# Patient Record
Sex: MALE | Race: WHITE | NOT HISPANIC OR LATINO | Employment: OTHER | URBAN - METROPOLITAN AREA
[De-identification: names, ages, dates, MRNs, and addresses within clinical notes are randomized per-mention and may not be internally consistent; named-entity substitution may affect disease eponyms.]

---

## 2017-01-13 ENCOUNTER — ALLSCRIPTS OFFICE VISIT (OUTPATIENT)
Dept: OTHER | Facility: OTHER | Age: 67
End: 2017-01-13

## 2017-01-13 ENCOUNTER — HOSPITAL ENCOUNTER (OUTPATIENT)
Dept: RADIOLOGY | Facility: MEDICAL CENTER | Age: 67
Discharge: HOME | End: 2017-01-13
Payer: COMMERCIAL

## 2017-01-13 ENCOUNTER — TRANSCRIBE ORDERS (OUTPATIENT)
Dept: ADMINISTRATIVE | Facility: HOSPITAL | Age: 67
End: 2017-01-13
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
## 2017-01-13 DIAGNOSIS — S60.012A: ICD-10-CM

## 2017-01-13 DIAGNOSIS — S69.92XA UNSPECIFIED INJURY OF LEFT WRIST, HAND AND FINGER(S), INITIAL ENCOUNTER: ICD-10-CM

## 2017-01-13 DIAGNOSIS — M79.645 PAIN OF FINGER OF LEFT HAND: ICD-10-CM

## 2017-01-13 PROCEDURE — 73140 X-RAY EXAM OF FINGER(S): CPT

## 2017-01-19 ENCOUNTER — ALLSCRIPTS OFFICE VISIT (OUTPATIENT)
Dept: OTHER | Facility: OTHER | Age: 67
End: 2017-01-19

## 2017-01-19 ENCOUNTER — TRANSCRIBE ORDERS (OUTPATIENT)
Dept: ADMINISTRATIVE | Facility: HOSPITAL | Age: 67
End: 2017-01-19

## 2017-01-19 DIAGNOSIS — S60.012A CONTUSION OF LEFT THUMB WITHOUT DAMAGE TO NAIL, INITIAL ENCOUNTER: Primary | ICD-10-CM

## 2017-01-30 ENCOUNTER — HOSPITAL ENCOUNTER (OUTPATIENT)
Dept: MRI IMAGING | Facility: HOSPITAL | Age: 67
Discharge: HOME | End: 2017-01-30
Payer: COMMERCIAL

## 2017-01-30 DIAGNOSIS — S60.012A CONTUSION OF LEFT THUMB WITHOUT DAMAGE TO NAIL, INITIAL ENCOUNTER: ICD-10-CM

## 2017-01-30 PROCEDURE — 73218 MRI UPPER EXTREMITY W/O DYE: CPT

## 2017-02-07 ENCOUNTER — ALLSCRIPTS OFFICE VISIT (OUTPATIENT)
Dept: OTHER | Facility: OTHER | Age: 67
End: 2017-02-07

## 2017-02-13 ENCOUNTER — ALLSCRIPTS OFFICE VISIT (OUTPATIENT)
Dept: OTHER | Facility: OTHER | Age: 67
End: 2017-02-13

## 2017-02-13 DIAGNOSIS — M79.645 PAIN OF FINGER OF LEFT HAND: ICD-10-CM

## 2017-02-13 DIAGNOSIS — S69.92XA UNSPECIFIED INJURY OF LEFT WRIST, HAND AND FINGER(S), INITIAL ENCOUNTER: ICD-10-CM

## 2017-02-13 DIAGNOSIS — E78.5 HYPERLIPIDEMIA: ICD-10-CM

## 2017-03-03 ENCOUNTER — HOSPITAL ENCOUNTER (OUTPATIENT)
Dept: RADIOLOGY | Facility: HOSPITAL | Age: 67
Discharge: HOME | End: 2017-03-03
Attending: ORTHOPAEDIC SURGERY
Payer: COMMERCIAL

## 2017-03-03 ENCOUNTER — ALLSCRIPTS OFFICE VISIT (OUTPATIENT)
Dept: OTHER | Facility: OTHER | Age: 67
End: 2017-03-03

## 2017-03-03 DIAGNOSIS — S69.92XA UNSPECIFIED INJURY OF LEFT WRIST, HAND AND FINGER(S), INITIAL ENCOUNTER: ICD-10-CM

## 2017-03-03 PROCEDURE — 76881 US COMPL JOINT R-T W/IMG: CPT

## 2017-03-06 ENCOUNTER — ALLSCRIPTS OFFICE VISIT (OUTPATIENT)
Dept: OTHER | Facility: OTHER | Age: 67
End: 2017-03-06

## 2017-03-27 ENCOUNTER — APPOINTMENT (OUTPATIENT)
Dept: OCCUPATIONAL THERAPY | Facility: CLINIC | Age: 67
End: 2017-03-27
Payer: COMMERCIAL

## 2017-03-27 DIAGNOSIS — M79.645 PAIN OF FINGER OF LEFT HAND: ICD-10-CM

## 2017-03-27 PROCEDURE — L3913 HFO W/O JOINTS CF: HCPCS

## 2017-03-27 PROCEDURE — 97165 OT EVAL LOW COMPLEX 30 MIN: CPT

## 2017-03-30 ENCOUNTER — APPOINTMENT (OUTPATIENT)
Dept: OCCUPATIONAL THERAPY | Facility: CLINIC | Age: 67
End: 2017-03-30
Payer: COMMERCIAL

## 2017-03-30 PROCEDURE — 97110 THERAPEUTIC EXERCISES: CPT

## 2017-03-30 PROCEDURE — 97140 MANUAL THERAPY 1/> REGIONS: CPT

## 2017-03-30 PROCEDURE — 97010 HOT OR COLD PACKS THERAPY: CPT

## 2017-04-04 ENCOUNTER — APPOINTMENT (OUTPATIENT)
Dept: OCCUPATIONAL THERAPY | Facility: CLINIC | Age: 67
End: 2017-04-04
Payer: COMMERCIAL

## 2017-04-04 PROCEDURE — 97010 HOT OR COLD PACKS THERAPY: CPT

## 2017-04-04 PROCEDURE — 97110 THERAPEUTIC EXERCISES: CPT

## 2017-04-04 PROCEDURE — 97140 MANUAL THERAPY 1/> REGIONS: CPT

## 2017-04-06 ENCOUNTER — APPOINTMENT (OUTPATIENT)
Dept: OCCUPATIONAL THERAPY | Facility: CLINIC | Age: 67
End: 2017-04-06
Payer: COMMERCIAL

## 2017-04-06 PROCEDURE — 97010 HOT OR COLD PACKS THERAPY: CPT

## 2017-04-06 PROCEDURE — 97140 MANUAL THERAPY 1/> REGIONS: CPT

## 2017-04-06 PROCEDURE — 97110 THERAPEUTIC EXERCISES: CPT

## 2017-04-11 ENCOUNTER — APPOINTMENT (OUTPATIENT)
Dept: OCCUPATIONAL THERAPY | Facility: CLINIC | Age: 67
End: 2017-04-11
Payer: COMMERCIAL

## 2017-04-11 PROCEDURE — 97010 HOT OR COLD PACKS THERAPY: CPT

## 2017-04-11 PROCEDURE — 97110 THERAPEUTIC EXERCISES: CPT

## 2017-04-11 PROCEDURE — 97140 MANUAL THERAPY 1/> REGIONS: CPT

## 2017-04-13 ENCOUNTER — APPOINTMENT (OUTPATIENT)
Dept: OCCUPATIONAL THERAPY | Facility: CLINIC | Age: 67
End: 2017-04-13
Payer: COMMERCIAL

## 2017-04-18 ENCOUNTER — APPOINTMENT (OUTPATIENT)
Dept: OCCUPATIONAL THERAPY | Facility: CLINIC | Age: 67
End: 2017-04-18
Payer: COMMERCIAL

## 2017-04-20 ENCOUNTER — APPOINTMENT (OUTPATIENT)
Dept: OCCUPATIONAL THERAPY | Facility: CLINIC | Age: 67
End: 2017-04-20
Payer: COMMERCIAL

## 2017-04-25 ENCOUNTER — APPOINTMENT (OUTPATIENT)
Dept: OCCUPATIONAL THERAPY | Facility: CLINIC | Age: 67
End: 2017-04-25
Payer: COMMERCIAL

## 2017-04-25 PROCEDURE — 97010 HOT OR COLD PACKS THERAPY: CPT

## 2017-04-25 PROCEDURE — 97140 MANUAL THERAPY 1/> REGIONS: CPT

## 2017-04-25 PROCEDURE — 97110 THERAPEUTIC EXERCISES: CPT

## 2017-04-27 ENCOUNTER — APPOINTMENT (OUTPATIENT)
Dept: OCCUPATIONAL THERAPY | Facility: CLINIC | Age: 67
End: 2017-04-27
Payer: COMMERCIAL

## 2017-04-27 PROCEDURE — 97140 MANUAL THERAPY 1/> REGIONS: CPT

## 2017-04-27 PROCEDURE — 97010 HOT OR COLD PACKS THERAPY: CPT

## 2017-04-27 PROCEDURE — 97110 THERAPEUTIC EXERCISES: CPT

## 2017-05-03 ENCOUNTER — APPOINTMENT (OUTPATIENT)
Dept: OCCUPATIONAL THERAPY | Facility: CLINIC | Age: 67
End: 2017-05-03
Payer: COMMERCIAL

## 2017-05-03 PROCEDURE — 97110 THERAPEUTIC EXERCISES: CPT

## 2017-05-03 PROCEDURE — 97140 MANUAL THERAPY 1/> REGIONS: CPT

## 2017-05-03 PROCEDURE — 97010 HOT OR COLD PACKS THERAPY: CPT

## 2017-05-09 ENCOUNTER — APPOINTMENT (OUTPATIENT)
Dept: OCCUPATIONAL THERAPY | Facility: CLINIC | Age: 67
End: 2017-05-09
Payer: COMMERCIAL

## 2017-05-09 PROCEDURE — 97140 MANUAL THERAPY 1/> REGIONS: CPT

## 2017-05-09 PROCEDURE — 97010 HOT OR COLD PACKS THERAPY: CPT

## 2017-05-11 ENCOUNTER — APPOINTMENT (OUTPATIENT)
Dept: OCCUPATIONAL THERAPY | Facility: CLINIC | Age: 67
End: 2017-05-11
Payer: COMMERCIAL

## 2017-05-11 PROCEDURE — 97140 MANUAL THERAPY 1/> REGIONS: CPT

## 2017-05-11 PROCEDURE — 97110 THERAPEUTIC EXERCISES: CPT

## 2017-05-11 PROCEDURE — 97010 HOT OR COLD PACKS THERAPY: CPT

## 2017-05-16 ENCOUNTER — APPOINTMENT (OUTPATIENT)
Dept: OCCUPATIONAL THERAPY | Facility: CLINIC | Age: 67
End: 2017-05-16
Payer: COMMERCIAL

## 2017-05-16 PROCEDURE — 97010 HOT OR COLD PACKS THERAPY: CPT

## 2017-05-16 PROCEDURE — 97140 MANUAL THERAPY 1/> REGIONS: CPT

## 2017-05-16 PROCEDURE — 97110 THERAPEUTIC EXERCISES: CPT

## 2017-05-19 ENCOUNTER — APPOINTMENT (OUTPATIENT)
Dept: OCCUPATIONAL THERAPY | Facility: CLINIC | Age: 67
End: 2017-05-19
Payer: COMMERCIAL

## 2017-05-23 ENCOUNTER — APPOINTMENT (OUTPATIENT)
Dept: OCCUPATIONAL THERAPY | Facility: CLINIC | Age: 67
End: 2017-05-23
Payer: COMMERCIAL

## 2017-05-24 DIAGNOSIS — C61 MALIGNANT NEOPLASM OF PROSTATE (HCC): ICD-10-CM

## 2017-05-26 ENCOUNTER — APPOINTMENT (OUTPATIENT)
Dept: LAB | Facility: OTHER | Age: 67
End: 2017-05-26
Payer: COMMERCIAL

## 2017-05-26 DIAGNOSIS — E78.5 HYPERLIPIDEMIA: ICD-10-CM

## 2017-05-26 DIAGNOSIS — C61 MALIGNANT NEOPLASM OF PROSTATE (HCC): ICD-10-CM

## 2017-05-26 LAB
CHOLEST SERPL-MCNC: 214 MG/DL (ref 50–200)
HDLC SERPL-MCNC: 51 MG/DL (ref 40–60)
LDLC SERPL CALC-MCNC: 138 MG/DL (ref 0–100)
PSA SERPL-MCNC: <0.1 NG/ML (ref 0–4)
TRIGL SERPL-MCNC: 124 MG/DL

## 2017-05-26 PROCEDURE — 80061 LIPID PANEL: CPT

## 2017-05-26 PROCEDURE — 84153 ASSAY OF PSA TOTAL: CPT

## 2017-05-26 PROCEDURE — 36415 COLL VENOUS BLD VENIPUNCTURE: CPT

## 2017-05-30 ENCOUNTER — GENERIC CONVERSION - ENCOUNTER (OUTPATIENT)
Dept: OTHER | Facility: OTHER | Age: 67
End: 2017-05-30

## 2017-05-31 ENCOUNTER — APPOINTMENT (OUTPATIENT)
Dept: OCCUPATIONAL THERAPY | Facility: CLINIC | Age: 67
End: 2017-05-31
Payer: COMMERCIAL

## 2017-05-31 ENCOUNTER — ALLSCRIPTS OFFICE VISIT (OUTPATIENT)
Dept: OTHER | Facility: OTHER | Age: 67
End: 2017-05-31

## 2017-05-31 PROCEDURE — 97110 THERAPEUTIC EXERCISES: CPT

## 2017-05-31 PROCEDURE — 97010 HOT OR COLD PACKS THERAPY: CPT

## 2017-05-31 PROCEDURE — 97140 MANUAL THERAPY 1/> REGIONS: CPT

## 2017-06-07 ENCOUNTER — ALLSCRIPTS OFFICE VISIT (OUTPATIENT)
Dept: OTHER | Facility: OTHER | Age: 67
End: 2017-06-07

## 2017-12-06 ENCOUNTER — ALLSCRIPTS OFFICE VISIT (OUTPATIENT)
Dept: OTHER | Facility: OTHER | Age: 67
End: 2017-12-06

## 2017-12-06 DIAGNOSIS — Z00.00 ENCOUNTER FOR GENERAL ADULT MEDICAL EXAMINATION WITHOUT ABNORMAL FINDINGS: ICD-10-CM

## 2017-12-06 DIAGNOSIS — C61 MALIGNANT NEOPLASM OF PROSTATE (HCC): ICD-10-CM

## 2017-12-06 DIAGNOSIS — Z85.828 PERSONAL HISTORY OF OTHER MALIGNANT NEOPLASM OF SKIN (CODE): ICD-10-CM

## 2017-12-06 DIAGNOSIS — E78.5 HYPERLIPIDEMIA: ICD-10-CM

## 2017-12-08 ENCOUNTER — APPOINTMENT (OUTPATIENT)
Dept: LAB | Facility: OTHER | Age: 67
End: 2017-12-08
Payer: COMMERCIAL

## 2017-12-08 ENCOUNTER — TRANSCRIBE ORDERS (OUTPATIENT)
Dept: LAB | Facility: OTHER | Age: 67
End: 2017-12-08

## 2017-12-08 DIAGNOSIS — C61 MALIGNANT NEOPLASM OF PROSTATE (HCC): ICD-10-CM

## 2017-12-08 DIAGNOSIS — Z85.828 PERSONAL HISTORY OF OTHER MALIGNANT NEOPLASM OF SKIN (CODE): ICD-10-CM

## 2017-12-08 DIAGNOSIS — Z00.00 ENCOUNTER FOR GENERAL ADULT MEDICAL EXAMINATION WITHOUT ABNORMAL FINDINGS: ICD-10-CM

## 2017-12-08 DIAGNOSIS — E78.5 HYPERLIPIDEMIA: ICD-10-CM

## 2017-12-08 LAB
ALBUMIN SERPL BCP-MCNC: 4 G/DL (ref 3.5–5)
ALP SERPL-CCNC: 56 U/L (ref 46–116)
ALT SERPL W P-5'-P-CCNC: 37 U/L (ref 12–78)
ANION GAP SERPL CALCULATED.3IONS-SCNC: 5 MMOL/L (ref 4–13)
AST SERPL W P-5'-P-CCNC: 19 U/L (ref 5–45)
BASOPHILS # BLD AUTO: 0.01 THOUSANDS/ΜL (ref 0–0.1)
BASOPHILS NFR BLD AUTO: 0 % (ref 0–1)
BILIRUB SERPL-MCNC: 0.61 MG/DL (ref 0.2–1)
BUN SERPL-MCNC: 22 MG/DL (ref 5–25)
CALCIUM SERPL-MCNC: 9.1 MG/DL (ref 8.3–10.1)
CHLORIDE SERPL-SCNC: 106 MMOL/L (ref 100–108)
CHOLEST SERPL-MCNC: 240 MG/DL (ref 50–200)
CO2 SERPL-SCNC: 27 MMOL/L (ref 21–32)
CREAT SERPL-MCNC: 1.07 MG/DL (ref 0.6–1.3)
EOSINOPHIL # BLD AUTO: 0.01 THOUSAND/ΜL (ref 0–0.61)
EOSINOPHIL NFR BLD AUTO: 0 % (ref 0–6)
ERYTHROCYTE [DISTWIDTH] IN BLOOD BY AUTOMATED COUNT: 13.5 % (ref 11.6–15.1)
GFR SERPL CREATININE-BSD FRML MDRD: 71 ML/MIN/1.73SQ M
GLUCOSE P FAST SERPL-MCNC: 86 MG/DL (ref 65–99)
HCT VFR BLD AUTO: 42.2 % (ref 36.5–49.3)
HDLC SERPL-MCNC: 54 MG/DL (ref 40–60)
HGB BLD-MCNC: 14.6 G/DL (ref 12–17)
LDLC SERPL CALC-MCNC: 166 MG/DL (ref 0–100)
LYMPHOCYTES # BLD AUTO: 1.54 THOUSANDS/ΜL (ref 0.6–4.47)
LYMPHOCYTES NFR BLD AUTO: 39 % (ref 14–44)
MCH RBC QN AUTO: 31.5 PG (ref 26.8–34.3)
MCHC RBC AUTO-ENTMCNC: 34.6 G/DL (ref 31.4–37.4)
MCV RBC AUTO: 91 FL (ref 82–98)
MONOCYTES # BLD AUTO: 0.31 THOUSAND/ΜL (ref 0.17–1.22)
MONOCYTES NFR BLD AUTO: 8 % (ref 4–12)
NEUTROPHILS # BLD AUTO: 2.03 THOUSANDS/ΜL (ref 1.85–7.62)
NEUTS SEG NFR BLD AUTO: 53 % (ref 43–75)
NRBC BLD AUTO-RTO: 0 /100 WBCS
PLATELET # BLD AUTO: 219 THOUSANDS/UL (ref 149–390)
PMV BLD AUTO: 11.5 FL (ref 8.9–12.7)
POTASSIUM SERPL-SCNC: 4.4 MMOL/L (ref 3.5–5.3)
PROT SERPL-MCNC: 7.4 G/DL (ref 6.4–8.2)
RBC # BLD AUTO: 4.64 MILLION/UL (ref 3.88–5.62)
SODIUM SERPL-SCNC: 138 MMOL/L (ref 136–145)
TRIGL SERPL-MCNC: 102 MG/DL
WBC # BLD AUTO: 3.92 THOUSAND/UL (ref 4.31–10.16)

## 2017-12-08 PROCEDURE — 85025 COMPLETE CBC W/AUTO DIFF WBC: CPT

## 2017-12-08 PROCEDURE — 36415 COLL VENOUS BLD VENIPUNCTURE: CPT

## 2017-12-08 PROCEDURE — 80053 COMPREHEN METABOLIC PANEL: CPT

## 2017-12-08 PROCEDURE — 80061 LIPID PANEL: CPT

## 2017-12-27 ENCOUNTER — GENERIC CONVERSION - ENCOUNTER (OUTPATIENT)
Dept: OTHER | Facility: OTHER | Age: 67
End: 2017-12-27

## 2018-01-12 VITALS
DIASTOLIC BLOOD PRESSURE: 80 MMHG | HEART RATE: 86 BPM | BODY MASS INDEX: 28.73 KG/M2 | HEIGHT: 69 IN | SYSTOLIC BLOOD PRESSURE: 130 MMHG | WEIGHT: 194 LBS

## 2018-01-12 VITALS
WEIGHT: 196.5 LBS | HEIGHT: 69 IN | BODY MASS INDEX: 29.1 KG/M2 | SYSTOLIC BLOOD PRESSURE: 130 MMHG | DIASTOLIC BLOOD PRESSURE: 70 MMHG

## 2018-01-12 VITALS
WEIGHT: 195.25 LBS | BODY MASS INDEX: 28.92 KG/M2 | SYSTOLIC BLOOD PRESSURE: 132 MMHG | DIASTOLIC BLOOD PRESSURE: 74 MMHG | HEIGHT: 69 IN

## 2018-01-13 VITALS
HEART RATE: 72 BPM | HEIGHT: 69 IN | WEIGHT: 193.5 LBS | SYSTOLIC BLOOD PRESSURE: 126 MMHG | BODY MASS INDEX: 28.66 KG/M2 | DIASTOLIC BLOOD PRESSURE: 78 MMHG

## 2018-01-13 VITALS
WEIGHT: 197.25 LBS | SYSTOLIC BLOOD PRESSURE: 128 MMHG | BODY MASS INDEX: 29.21 KG/M2 | HEIGHT: 69 IN | HEART RATE: 74 BPM | DIASTOLIC BLOOD PRESSURE: 65 MMHG

## 2018-01-13 VITALS
BODY MASS INDEX: 28.44 KG/M2 | WEIGHT: 192 LBS | DIASTOLIC BLOOD PRESSURE: 70 MMHG | SYSTOLIC BLOOD PRESSURE: 119 MMHG | HEART RATE: 86 BPM | HEIGHT: 69 IN

## 2018-01-13 VITALS
HEART RATE: 66 BPM | BODY MASS INDEX: 29.15 KG/M2 | OXYGEN SATURATION: 97 % | WEIGHT: 196.8 LBS | DIASTOLIC BLOOD PRESSURE: 78 MMHG | TEMPERATURE: 97.8 F | SYSTOLIC BLOOD PRESSURE: 140 MMHG | RESPIRATION RATE: 16 BRPM | HEIGHT: 69 IN

## 2018-01-14 NOTE — RESULT NOTES
Verified Results  (1) CBC/PLT/DIFF 05Oct2016 10:12AM [facility] Order Number: [id number]     Test Name Result Flag Reference   WBC COUNT 4 54 Thousand/uL  4 31-10 16   RBC COUNT 4 84 Million/uL  3 88-5 62   HEMOGLOBIN 14 7 g/dL  12 0-17 0   HEMATOCRIT 43 5 %  36 5-49 3   MCV 90 fL  82-98   MCH 30 4 pg  26 8-34 3   MCHC 33 8 g/dL  31 4-37 4   RDW 13 2 %  11 6-15 1   MPV 11 0 fL  8 9-12 7   PLATELET COUNT 179 Thousands/uL  149-390   nRBC AUTOMATED 0 /100 WBCs     NEUTROPHILS RELATIVE PERCENT 60 %  43-75   LYMPHOCYTES RELATIVE PERCENT 33 %  14-44   MONOCYTES RELATIVE PERCENT 6 %  4-12   EOSINOPHILS RELATIVE PERCENT 1 %  0-6   BASOPHILS RELATIVE PERCENT 0 %  0-1   NEUTROPHILS ABSOLUTE COUNT 2 72 Thousands/?L  1 85-7 62   LYMPHOCYTES ABSOLUTE COUNT 1 51 Thousands/?L  0 60-4 47   MONOCYTES ABSOLUTE COUNT 0 26 Thousand/?L  0 17-1 22   EOSINOPHILS ABSOLUTE COUNT 0 03 Thousand/?L  0 00-0 61   BASOPHILS ABSOLUTE COUNT 0 01 Thousands/?L  0 00-0 10   - Patient Instructions: This bloodwork is non-fasting  Please drink two glasses of water morning of bloodwork  - Patient Instructions: This bloodwork is non-fasting  Please drink two glasses of water morning of bloodwork  (1) COMPREHENSIVE METABOLIC PANEL [id number] 68:87KR [facility] Order Number: [id number]     Test Name Result Flag Reference   GLUCOSE,RANDM 104 mg/dL     If the patient is fasting, the ADA then defines impaired fasting glucose as > 100 mg/dL and diabetes as > or equal to 123 mg/dL     SODIUM 138 mmol/L  136-145   POTASSIUM 4 6 mmol/L  3 5-5 3   CHLORIDE 105 mmol/L  100-108   CARBON DIOXIDE 27 mmol/L  21-32   ANION GAP (CALC) 6 mmol/L  4-13   BLOOD UREA NITROGEN 18 mg/dL  5-25   CREATININE 1 24 mg/dL  0 60-1 30   Standardized to IDMS reference method   CALCIUM 8 9 mg/dL  8 3-10 1   BILI, TOTAL 0 51 mg/dL  0 20-1 00   ALK PHOSPHATAS 55 U/L     ALT (SGPT) 51 U/L  12-78   AST(SGOT) 21 U/L  5-45   ALBUMIN 4 2 g/dL  3 5-5 0   TOTAL PROTEIN 7 5 g/dL  6 4-8 2   eGFR Non-African American 58 1 ml/min/1 73sq m     - Patient Instructions: This is a fasting blood test  Water,black tea or black  coffee only after 9:00pm the night before test Drink 2 glasses of water the morning of test   National Kidney Disease Education Program recommendations are as follows:  GFR calculation is accurate only with a steady state creatinine  Chronic Kidney disease less than 60 ml/min/1 73 sq  meters  Kidney failure less than 15 ml/min/1 73 sq  meters  (1) LIPID PANEL, FASTING 05Oct2016 10:12AM [facility] Order Number: [id number]     Test Name Result Flag Reference   CHOLESTEROL 250 mg/dL H    HDL,DIRECT 47 mg/dL  40-60   Specimen collection should occur prior to Metamizole administration due to the potential for falsely depressed results  LDL CHOLESTEROL CALCULATED 181 mg/dL H 0-100   - Patient Instructions: This is a fasting blood test  Water,black tea or black  coffee only after 9:00pm the night before test   Drink 2 glasses of water the morning of test     - Patient Instructions: This is a fasting blood test  Water,black tea or black  coffee only after 9:00pm the night before test Drink 2 glasses of water the morning of test   Triglyceride:         Normal              <150 mg/dl       Borderline High    150-199 mg/dl       High               200-499 mg/dl       Very High          >499 mg/dl  Cholesterol:         Desirable        <200 mg/dl      Borderline High  200-239 mg/dl      High             >239 mg/dl  HDL Cholesterol:        High    >59 mg/dL      Low     <41 mg/dL  LDL CALCULATED:    This screening LDL is a calculated result  It does not have the accuracy of the Direct Measured LDL in the monitoring of patients with hyperlipidemia and/or statin therapy  Direct Measure LDL (QZC464) must be ordered separately in these patients     TRIGLYCERIDES 111 mg/dL  <=150   Specimen collection should occur prior to N-Acetylcysteine or Metamizole administration due to the potential for falsely depressed results

## 2018-01-15 VITALS
BODY MASS INDEX: 28.85 KG/M2 | WEIGHT: 194.8 LBS | SYSTOLIC BLOOD PRESSURE: 138 MMHG | HEIGHT: 69 IN | HEART RATE: 75 BPM | TEMPERATURE: 97.4 F | DIASTOLIC BLOOD PRESSURE: 74 MMHG | RESPIRATION RATE: 16 BRPM

## 2018-01-16 NOTE — PROGRESS NOTES
Assessment    1  Borderline hyperlipidemia (272 4) (E78 5)   2  Encounter for preventive health examination (V70 0) (Z00 00)   3  Neck mass (784 2) (R22 1)   4  Ganglion of left wrist (727 41) (Q56 965)    Plan  Borderline hyperlipidemia    · Pravachol 20 MG Oral Tablet (Pravastatin Sodium); Take 1 tablet daily  Borderline hyperlipidemia, Health Maintenance, Ganglion of left wrist, Neck mass    · (1) CBC/PLT/DIFF; Status:Active; Requested for:03Oct2016;    · (1) COMPREHENSIVE METABOLIC PANEL; Status:Active; Requested for:03Oct2016;    · (1) LIPID PANEL, FASTING; Status:Active; Requested for:03Oct2016;   Neck mass    · US HEAD NECK SOFT TISSUE; Status:Hold For - Scheduling; Requested XZE:[id number];     · US THYROID; Status:Hold For - Scheduling; Requested for:03Oct2016;     Discussion/Summary  Impression: health maintenance visit  Currently, he eats a healthy diet and has an inadequate exercise regimen  Prostate cancer screening: prostate cancer screening is current  Colorectal cancer screening: colorectal cancer screening is current  The patient declines immunizations and declines flu shot today  Patient discussion: discussed with the patient  Check labs, check us of neck  will call with results  reassured about the ganglion cyst       Chief Complaint  patient presented here for physical      History of Present Illness  HM, Adult Male: The patient is being seen for a health maintenance evaluation  General Health: The patient's health since the last visit is described as good  He has regular dental visits  He complains of vision problems  Vision care includes wearing glasses and having regular eye examinations  Lifestyle:  He consumes a diverse and healthy diet  He exercises regularly  He uses tobacco  The patient is a former cigarette smoker  He consumes alcohol  He reports occasional alcohol use  Reproductive health:  He denies erectile dysfunction     Screening:   HPI: here for physical, follows with urology yearly  colonoscopy few months ago  denies trouble swallowing, mass in neck has gotten larger since last US 4-5 years ago  lump in L wrist, shrinks and swells at time, about a year  Review of Systems    Constitutional: No fever or chills, feels well, no tiredness, no recent weight gain or weight loss  Eyes: eyesight problems, but no eye pain, no dryness of the eyes, eyes not red and no purulent discharge from the eyes  ENT: no complaints of earache, no hearing loss, no nosebleeds, no nasal discharge, no sore throat, no hoarseness  Cardiovascular: No complaints of slow heart rate, no fast heart rate, no chest pain, no palpitations, no leg claudication, no lower extremity  Respiratory: No complaints of shortness of breath, no wheezing, no cough, no SOB on exertion, no orthopnea or PND  Gastrointestinal: No complaints of abdominal pain, no constipation, no nausea or vomiting, no diarrhea or bloody stools  Genitourinary: No complaints of dysuria, no incontinence, no hesitancy, no nocturia, no genital lesion, no testicular pain  Musculoskeletal: lump on the L wrist, but No complaints of arthralgia, no myalgias, no joint swelling or stiffness, no limb pain or swelling  Integumentary: No complaints of skin rash or skin lesions, no itching, no skin wound, no dry skin  Neurological: No compliants of headache, no confusion, no convulsions, no numbness or tingling, no dizziness or fainting, no limb weakness, no difficulty walking  Hematologic/Lymphatic: swollen glands in the neck and lump on neck  ROS reviewed  Active Problems    1  Borderline hyperlipidemia (272 4) (E78 5)   2  Carcinoma of prostate (185) (C61)   3  Encounter for screening for malignant neoplasm of colon (V76 51) (Z12 11)   4  Pre-operative cardiovascular examination (V72 81) (Z01 810)   5  Pre-operative clearance (V72 84) (Z01 818)   6   Special screening examination for neoplasm of prostate (V76 44) (Z12 5)    Past Medical History    · History of Arthritis of hand (716 94) (M19 90)   · History of Chest pain (786 50) (R07 9)   · History of basal cell carcinoma (V10 83) (H82 835)   · History of malignant neoplasm of prostate (V10 46) (Z85 46)   · History of Lump of skin (782 2) (R22 9)    Surgical History    · History of Prostate Surgery    Family History  Mother    · Family history of Cancer   · Family history of malignant neoplasm of uterus (V16 49) (Z80 51)  Father    · Family history of Diabetes  Sister    · Family history of congenital heart disease (V19 5) (Z82 79)   · Family history of Heart disease  Brother    · Family history of lung cancer (V16 1) (Z80 1)    Social History    · Being A Social Drinker   · Former smoker (Y65 96) (N80 111)    Current Meds   1  Alpha-Lipoic Acid TABS; Therapy: (Recorded:10Aug2015) to Recorded   2  [facility] [place]-[facility] CAPS; Therapy: (Recorded:10Aug2015) to Recorded   3  Diclofenac Sodium 75 MG Oral Tablet Delayed Release; TAKE 1 TABLET Every twelve   hours PRN pain take with food; Therapy: 12Kut9025 to (Evaluate:87Rvv1176); Last Rx:12Aug2015 Ordered   4  Glucosamine-Chondroitin Oral Tablet; Therapy: (Recorded:10Aug2015) to Recorded   5  Multivitamins CAPS; Therapy: (Recorded:10Aug2015) to Recorded   6  Pravachol 20 MG Oral Tablet; Take 1 tablet daily; Therapy: 06QBK4681 to (Evaluate:45Szk9283)  Requested for: 55XNQ5028; Last   Rx:22Jun2016 Ordered   7  Selenium 200 MCG Oral Capsule; Therapy: (Recorded:10Aug2015) to Recorded   8  Turmeric Curcumin CAPS; Therapy: (Recorded:10Aug2015) to Recorded   9  Vitamin C TABS; Therapy: (Recorded:10Aug2015) to Recorded   10  Vitamin D3 5000 UNIT Oral Capsule; Therapy: (Recorded:10Aug2015) to Recorded   11  Vitamin E 400 UNIT Oral Capsule; Therapy: (Recorded:10Aug2015) to Recorded   12  Zinc CAPS; Therapy: (Recorded:10Aug2015) to Recorded    Allergies    1   No Known Drug Allergies    Vitals   Recorded: 38CMA5692 45:85AO   Systolic 154 mm Hg, LUE, Sitting   Diastolic 72 mm Hg, LUE, Sitting   Heart Rate 78   Pulse Quality Normal   Respiration Quality Normal   Respiration 16   Temperature 97 3 F, Tympanic   O2 Saturation 98   Height 5 ft 9 in   Weight 191 lb 4 oz   BMI Calculated 28 24 kg/m2   BSA Calculated 2 03 m2     Physical Exam    Constitutional   General appearance: No acute distress, well appearing and well nourished  Eyes   Conjunctiva and lids: No erythema, swelling or discharge  Pupils and irises: Equal, round, reactive to light  Ears, Nose, Mouth, and Throat   External inspection of ears and nose: Normal     Otoscopic examination: Tympanic membranes translucent with normal light reflex  Canals patent without erythema  Hearing: Normal     Nasal mucosa, septum, and turbinates: Normal without edema or erythema  Lips, teeth, and gums: Normal, good dentition  Oropharynx: Normal with no erythema, edema, exudate or lesions  Neck   Neck: Abnormal   mass over the thyroid area, growing gradually over the last few years  Thyroid: Normal, no thyromegaly  lipoma/mass vs  thyromegaly  Pulmonary   Respiratory effort: No increased work of breathing or signs of respiratory distress  Auscultation of lungs: Clear to auscultation  Cardiovascular   Auscultation of heart: Normal rate and rhythm, normal S1 and S2, no murmurs  Examination of extremities for edema and/or varicosities: Normal     Chest   Chest: Normal     Abdomen   Abdomen: Non-tender, no masses  Procedure    Procedure:   Results: 20/20 in both eyes without corrective device, 20/25 in the right eye without corrective device, 20/40 in the left eye without corrective device normal in both eyes  Procedure: Audiometry: Normal bilaterally  Hearing in the right ear: 20 decibals at 500 hertz, 25 decibals at 1000 hertz, 20 decibals at 2000 hertz and 20 decibals at 4000 hertz     Hearing in the left ear: 20 decibals at 500 hertz, 25 decibals at 1000 hertz, 20 decibals at 2000 hertz and 20 decibals at 4000 hertz         Signatures   Electronically signed by : GERARDO Jha ; Oct  3 2016 10:00AM EST                       (Author)

## 2018-01-16 NOTE — RESULT NOTES
Verified Results  (1) LIPID PANEL FASTING W DIRECT LDL REFLEX [id number] 10:38AM Veron[facility] Beck Order Number: [id number]     Test Name Result Flag Reference   CHOLESTEROL 214 mg/dL H    LDL CHOLESTEROL CALCULATED 138 mg/dL H 0-100   - Patient Instructions: This is a fasting blood test  Water, black tea or black coffee only after 9:00pm the night before test   Drink 2 glasses of water the morning of test       Triglyceride:         Normal              <150 mg/dl       Borderline High    150-199 mg/dl       High               200-499 mg/dl       Very High          >499 mg/dl  Cholesterol:         Desirable        <200 mg/dl      Borderline High  200-239 mg/dl      High             >239 mg/dl  HDL Cholesterol:        High    >59 mg/dL      Low     <41 mg/dL  LDL Cholesterol:        Optimal          <100 mg/dl        Near Optimal     100-129 mg/dl        Above Optimal          Borderline High   130-159 mg/dl          High              160-189 mg/dl          Very High        >189 mg/dl  LDL CALCULATED:    This screening LDL is a calculated result  It does not have the accuracy of the Direct Measured LDL in the monitoring of patients with hyperlipidemia and/or statin therapy  Direct Measure LDL (KYC181) must be ordered separately in these patients  TRIGLYCERIDES 124 mg/dL  <=150   Specimen collection should occur prior to N-Acetylcysteine or Metamizole administration due to the potential for falsely depressed results  HDL,DIRECT 51 mg/dL  40-60   Specimen collection should occur prior to Metamizole administration due to the potential for falsely depressed results

## 2018-01-23 VITALS
HEART RATE: 76 BPM | BODY MASS INDEX: 28.64 KG/M2 | HEIGHT: 69 IN | SYSTOLIC BLOOD PRESSURE: 128 MMHG | RESPIRATION RATE: 16 BRPM | OXYGEN SATURATION: 97 % | TEMPERATURE: 97.6 F | WEIGHT: 193.38 LBS | DIASTOLIC BLOOD PRESSURE: 70 MMHG

## 2018-01-23 NOTE — PROGRESS NOTES
Assessment    1  Borderline hyperlipidemia (272 4) (E78 5)   2  Carcinoma of prostate (185) (C61)   3  History of basal cell carcinoma (BCC) (V10 83) (Z85 828)   4  Encounter for preventive health examination (V70 0) (Z00 00)   5  Glaucoma screening (V80 1) (Z13 5)   6  Acute otitis externa of left ear, unspecified type (380 10) (H60 502)    Plan  Acute otitis externa of left ear, unspecified type    · Neomycin-Polymyxin-HC 3 [id number] Otic Suspension; INSTILL 3 DROPS IN  AFFECTED EAR(S) 3-4 TIMES DAILY  Borderline hyperlipidemia, Carcinoma of prostate, Health Maintenance, PMH: History of  basal cell carcinoma (BCC)    · (1) CBC/PLT/DIFF; Status:Active; Requested for:00Mqn1838;    · (1) COMPREHENSIVE METABOLIC PANEL; Status:Active; Requested for:10Tuu4788;    · (1) LIPID PANEL, FASTING; Status:Active; Requested for:98Goy2525;     Discussion/Summary  health maintenance visit Currently, he eats a healthy diet  Prostate cancer screening: prostate cancer screening is managed by [facility] and prostate cancer screening is current  Colorectal cancer screening: colorectal cancer screening is current  He was advised to be evaluated by an ophthalmologist  Patient discussion: discussed with the patient  Try drops for persistent ear sx, check labs, will call with results, may not need to go back on statin, consider niacin flush free  f/u with eye doctor and derm  Possible side effects of new medications were reviewed with the patient/guardian today  The treatment plan was reviewed with the patient/guardian  The patient/guardian understands and agrees with the treatment plan      Chief Complaint  patient presented here for physical      History of Present Illness  HM, Adult Male: The patient is being seen for a health maintenance evaluation  General Health: The patient's health since the last visit is described as good     Screening:   HPI: L ear clogged, no drainage no change in hearing  h/o hyperlipidemia, off the statin for 8 months due to lack of RF  last checked in May 2017, ironically the panel was under better control than previously  taking omega 3 bid  c/o lesion over the L lower neck and a growing mass at the base of the neck in front of the throat that has been growing over the last 3 years, an [address] came back unremarkable last year  no trouble swallowing, no pain  sees urology yearly for prostate CA, removed 7 yrs ago with additional radiation  has upcoming appt with Dr Ricki Anaya, known pt, h/o BCC  lesion on L forehead growing  denies fam or personal h/o CAD, MI, CVA  freq itching and scaling of the L ear canal      Review of Systems    Constitutional: No fever or chills, feels well, no tiredness, no recent weight gain or weight loss  Eyes: No complaints of eye pain, no red eyes, no discharge from eyes, no itchy eyes  ENT: no complaints of earache, no hearing loss, no nosebleeds, no nasal discharge, no sore throat, no hoarseness  Cardiovascular: No complaints of slow heart rate, no fast heart rate, no chest pain, no palpitations, no leg claudication, no lower extremity  Respiratory: No complaints of shortness of breath, no wheezing, no cough, no SOB on exertion, no orthopnea or PND  Gastrointestinal: No complaints of abdominal pain, no constipation, no nausea or vomiting, no diarrhea or bloody stools  Musculoskeletal: No complaints of arthralgia, no myalgias, no joint swelling or stiffness, no limb pain or swelling  Neurological: No compliants of headache, no confusion, no convulsions, no numbness or tingling, no dizziness or fainting, no limb weakness, no difficulty walking  Hematologic/Lymphatic: No complaints of swollen glands, no swollen glands in the neck, does not bleed easily, no easy bruising  ROS reviewed  Active Problems    1  Borderline hyperlipidemia (272 4) (E78 5)   2  Carcinoma of prostate (185) (C61)   3   Encounter for screening for malignant neoplasm of colon (V76 51) (Z12 11)   4  Pre-operative clearance (V72 84) (Z01 818)   5  Special screening examination for neoplasm of prostate (V76 44) (Z12 5)    Past Medical History    · History of Acute lumbar back pain (724 2) (M54 5)   · History of Arthritis of hand (716 94) (M19 049)   · History of Chest pain (786 50) (R07 9)   · History of Contusion of left thumb without damage to nail, initial encounter (923 3)  (F09 227N)   · History of Fall from ladder, initial encounter (E881 0) (O13 RKYA)   · History of Ganglion of left wrist (727 41) (X69 564)   · History of basal cell carcinoma (BCC) (V10 83) (Z85 828)   · History of malignant neoplasm of prostate (V10 46) (Z85 46)   · History of Injury of left thumb, initial encounter (959 5) (Q27 41XF)   · History of Lump of skin (782 2) (R22 9)   · History of Neck mass (784 2) (R22 1)   · History of Pain of left thumb (729 5) (M79 645)   · History of Paresthesia of left thumb (782 0) (R20 2)   · History of Rupture of radial collateral ligament of left thumb, initial encounter (842 10)  (S53 22XA)    Surgical History    · History of Prostate Surgery    Family History  Mother    · Family history of Cancer   · Family history of malignant neoplasm of uterus (V16 49) (Z80 51)  Father    · Family history of Diabetes  Sister    · Family history of congenital heart disease (V19 5) (Z82 79)   · Family history of Heart disease  Brother    · Family history of lung cancer (V16 1) (Z80 1)    Social History    · Being A Social Drinker   · History of Former smoker (V15 82) (J28 711)   · Never a smoker    Current Meds   1  Alpha-Lipoic Acid TABS; Therapy: (Recorded:39Dlt6496) to Recorded   2  [facility] [place]-[facility] CAPS; Therapy: (Recorded:00Awt3525) to Recorded   3  Glucosamine-Chondroitin Oral Tablet; Therapy: (Recorded:29Nwm9906) to Recorded   4  Multivitamins CAPS; Therapy: (Recorded:35Ntl6138) to Recorded   5  Selenium 200 MCG Oral Capsule; Therapy: (Recorded:73Bxb5713) to Recorded   6   Turmeric Curcumin CAPS; Therapy: (Recorded:10Aug2015) to Recorded   7  Vitamin C TABS; Therapy: (Recorded:10Aug2015) to Recorded   8  Vitamin D3 5000 UNIT Oral Capsule; Therapy: (Recorded:10Aug2015) to Recorded   9  Vitamin E 400 UNIT Oral Capsule; Therapy: (Recorded:10Aug2015) to Recorded   10  Zinc CAPS; Therapy: (Recorded:10Aug2015) to Recorded    Allergies    1  No Known Drug Allergies    Vitals   Recorded: 58KOO8002 08:47AM   Temperature 97 6 F, Tympanic   Heart Rate 76   Pulse Quality Normal   Respiration Quality Normal   Respiration 16   Systolic 175, LUE, Sitting   Diastolic 70, LUE, Sitting   Height 5 ft 9 in   Weight 193 lb 6 oz   BMI Calculated 28 56   BSA Calculated 2 04   O2 Saturation 97     Physical Exam    Constitutional   General appearance: No acute distress, well appearing and well nourished  Eyes   Conjunctiva and lids: No erythema, swelling or discharge  Pupils and irises: Equal, round, reactive to light  Ears, Nose, Mouth, and Throat   External inspection of ears and nose: Normal     Otoscopic examination: Abnormal   cerumen b/l, redness in the L canal    Hearing: Normal     Nasal mucosa, septum, and turbinates: Normal without edema or erythema  Lips, teeth, and gums: Normal, good dentition  Oropharynx: Normal with no erythema, edema, exudate or lesions  Neck   Neck: Abnormal   mass at the base of the anterior neck, mobile, measures about 2cm, soft, within the subcut tissue  Thyroid: Normal, no thyromegaly  Pulmonary   Respiratory effort: No increased work of breathing or signs of respiratory distress  Auscultation of lungs: Clear to auscultation  Cardiovascular   Auscultation of heart: Normal rate and rhythm, normal S1 and S2, no murmurs  Examination of extremities for edema and/or varicosities: Normal     Abdomen   Abdomen: Non-tender, no masses  Liver and spleen: No hepatomegaly or splenomegaly      Lymphatic   Palpation of lymph nodes in neck: No lymphadenopathy  Musculoskeletal   Gait and station: Normal     Inspection/palpation of joints, bones, and muscles: Normal     Range of motion: Normal     Stability: Normal     Muscle strength/tone: Normal     Skin   Examination of the skin for lesions: Abnormal   rough slightly raised lesion on the L forehead, rasied scaly lesion on the L anterior neck/clavicle  Neurologic   Cortical function: Normal mental status  Reflexes: 2+ and symmetric  Coordination: Normal finger to nose and heel to shin  Psychiatric   Judgment and insight: Normal     Orientation to person, place and time: Normal     Recent and remote memory: Intact  Mood and affect: Normal        Procedure    Procedure: Visual Acuity Test    Indication: routine screening  Results: 20/20 in both eyes without corrective device, 20/25 in the right eye without corrective device, 20/40 in the left eye without corrective device normal in both eyes  Color vision was and the results were normal      Procedure: Hearing Acuity Test    Indication: Routine screeing  Audiometry: Normal bilaterally  Hearing in the right ear: 20 decibals at 500 hertz, 20 decibals at 1000 hertz, 20 decibals at 2000 hertz and 20 decibals at 4000 hertz  Hearing in the left ear: 20 decibals at 500 hertz, 20 decibals at 1000 hertz, 20 decibals at 2000 hertz and 20 decibals at 4000 hertz  Procedure: cerumen removal    Indication: tympanic membrane(s) could not be visualized and cerumen impaction in both ears  Procedure Note: The procedure was performed by the Provider  A otoscope was placed in the ear canal(s) to visualize the ear canal debris  The ear was cleaned by using warm water irrigation  The procedure was successful  Post-Procedure:   Patient Status: the patient tolerated the procedure well  Complications: there were no complications  Patient instructions: dry ear precautions and avoid using q-tips  Follow-up as needed        Future Appointments    Date/Time Provider Specialty Site   06/01/2018 09:45 AM Troy Rothman, [facility] Neurology [facility] [place]     Signatures   Electronically signed by : GERARDO Walker ; Dec  6 2017 12:41PM EST                       (Author)

## 2018-01-24 VITALS
TEMPERATURE: 97.6 F | OXYGEN SATURATION: 98 % | HEIGHT: 69 IN | WEIGHT: 194.13 LBS | DIASTOLIC BLOOD PRESSURE: 66 MMHG | BODY MASS INDEX: 28.75 KG/M2 | SYSTOLIC BLOOD PRESSURE: 112 MMHG | RESPIRATION RATE: 16 BRPM | HEART RATE: 74 BPM

## 2018-05-01 DIAGNOSIS — C61 MALIGNANT NEOPLASM OF PROSTATE (HCC): ICD-10-CM

## 2018-06-07 ENCOUNTER — TELEPHONE (OUTPATIENT)
Dept: FAMILY MEDICINE CLINIC | Facility: CLINIC | Age: 68
End: 2018-06-07

## 2018-06-07 DIAGNOSIS — E78.2 MIXED HYPERLIPIDEMIA: Primary | ICD-10-CM

## 2018-06-07 NOTE — TELEPHONE ENCOUNTER
PATIENT IS TAKING ATORVASTATIN AND WANTS TO MAKE SURE ALL IS WELL CAN YOU PLEASE PUT ORDERS IN FOR B/W

## 2018-06-08 ENCOUNTER — APPOINTMENT (OUTPATIENT)
Dept: LAB | Facility: CLINIC | Age: 68
End: 2018-06-08
Payer: COMMERCIAL

## 2018-06-08 ENCOUNTER — TRANSCRIBE ORDERS (OUTPATIENT)
Dept: ADMINISTRATIVE | Facility: HOSPITAL | Age: 68
End: 2018-06-08

## 2018-06-08 DIAGNOSIS — E78.2 MIXED HYPERLIPIDEMIA: ICD-10-CM

## 2018-06-08 DIAGNOSIS — C61 MALIGNANT NEOPLASM OF PROSTATE (HCC): Primary | ICD-10-CM

## 2018-06-08 DIAGNOSIS — C61 MALIGNANT NEOPLASM OF PROSTATE (HCC): ICD-10-CM

## 2018-06-08 LAB
ALBUMIN SERPL BCP-MCNC: 4.3 G/DL (ref 3.5–5)
ALP SERPL-CCNC: 51 U/L (ref 46–116)
ALT SERPL W P-5'-P-CCNC: 33 U/L (ref 12–78)
ANION GAP SERPL CALCULATED.3IONS-SCNC: 7 MMOL/L (ref 4–13)
AST SERPL W P-5'-P-CCNC: 19 U/L (ref 5–45)
BASOPHILS # BLD AUTO: 0.02 THOUSANDS/ΜL (ref 0–0.1)
BASOPHILS NFR BLD AUTO: 0 % (ref 0–1)
BILIRUB SERPL-MCNC: 0.93 MG/DL (ref 0.2–1)
BUN SERPL-MCNC: 21 MG/DL (ref 5–25)
CALCIUM SERPL-MCNC: 9.4 MG/DL (ref 8.3–10.1)
CHLORIDE SERPL-SCNC: 107 MMOL/L (ref 100–108)
CHOLEST SERPL-MCNC: 191 MG/DL (ref 50–200)
CO2 SERPL-SCNC: 25 MMOL/L (ref 21–32)
CREAT SERPL-MCNC: 1.17 MG/DL (ref 0.6–1.3)
EOSINOPHIL # BLD AUTO: 0.01 THOUSAND/ΜL (ref 0–0.61)
EOSINOPHIL NFR BLD AUTO: 0 % (ref 0–6)
ERYTHROCYTE [DISTWIDTH] IN BLOOD BY AUTOMATED COUNT: 13.1 % (ref 11.6–15.1)
GFR SERPL CREATININE-BSD FRML MDRD: 64 ML/MIN/1.73SQ M
GLUCOSE P FAST SERPL-MCNC: 88 MG/DL (ref 65–99)
HCT VFR BLD AUTO: 46 % (ref 36.5–49.3)
HDLC SERPL-MCNC: 49 MG/DL (ref 40–60)
HGB BLD-MCNC: 14.8 G/DL (ref 12–17)
IMM GRANULOCYTES # BLD AUTO: 0.02 THOUSAND/UL (ref 0–0.2)
IMM GRANULOCYTES NFR BLD AUTO: 0 % (ref 0–2)
LDLC SERPL CALC-MCNC: 122 MG/DL (ref 0–100)
LYMPHOCYTES # BLD AUTO: 1.53 THOUSANDS/ΜL (ref 0.6–4.47)
LYMPHOCYTES NFR BLD AUTO: 34 % (ref 14–44)
MCH RBC QN AUTO: 30.1 PG (ref 26.8–34.3)
MCHC RBC AUTO-ENTMCNC: 32.2 G/DL (ref 31.4–37.4)
MCV RBC AUTO: 94 FL (ref 82–98)
MONOCYTES # BLD AUTO: 0.44 THOUSAND/ΜL (ref 0.17–1.22)
MONOCYTES NFR BLD AUTO: 10 % (ref 4–12)
NEUTROPHILS # BLD AUTO: 2.49 THOUSANDS/ΜL (ref 1.85–7.62)
NEUTS SEG NFR BLD AUTO: 56 % (ref 43–75)
NONHDLC SERPL-MCNC: 142 MG/DL
NRBC BLD AUTO-RTO: 0 /100 WBCS
PLATELET # BLD AUTO: 226 THOUSANDS/UL (ref 149–390)
PMV BLD AUTO: 11.4 FL (ref 8.9–12.7)
POTASSIUM SERPL-SCNC: 4.7 MMOL/L (ref 3.5–5.3)
PROT SERPL-MCNC: 7.4 G/DL (ref 6.4–8.2)
PSA SERPL-MCNC: <0.1 NG/ML (ref 0–4)
RBC # BLD AUTO: 4.91 MILLION/UL (ref 3.88–5.62)
SODIUM SERPL-SCNC: 139 MMOL/L (ref 136–145)
TRIGL SERPL-MCNC: 100 MG/DL
WBC # BLD AUTO: 4.51 THOUSAND/UL (ref 4.31–10.16)

## 2018-06-08 PROCEDURE — 85025 COMPLETE CBC W/AUTO DIFF WBC: CPT

## 2018-06-08 PROCEDURE — 84153 ASSAY OF PSA TOTAL: CPT

## 2018-06-08 PROCEDURE — 80061 LIPID PANEL: CPT

## 2018-06-08 PROCEDURE — 80053 COMPREHEN METABOLIC PANEL: CPT

## 2018-06-08 PROCEDURE — 36415 COLL VENOUS BLD VENIPUNCTURE: CPT

## 2018-06-15 RX ORDER — ATORVASTATIN CALCIUM 10 MG/1
1 TABLET, FILM COATED ORAL DAILY
COMMUNITY
Start: 2016-10-06 | End: 2019-08-13 | Stop reason: ALTCHOICE

## 2018-06-15 RX ORDER — NEOMYCIN SULFATE, POLYMYXIN B SULFATE AND HYDROCORTISONE 10; 3.5; 1 MG/ML; MG/ML; [USP'U]/ML
3 SUSPENSION/ DROPS AURICULAR (OTIC)
COMMUNITY
Start: 2017-12-06 | End: 2018-06-18 | Stop reason: ALTCHOICE

## 2018-06-15 RX ORDER — MAG HYDROX/ALUMINUM HYD/SIMETH 400-400-40
SUSPENSION, ORAL (FINAL DOSE FORM) ORAL
COMMUNITY

## 2018-06-15 RX ORDER — ALBUTEROL SULFATE 90 UG/1
2 AEROSOL, METERED RESPIRATORY (INHALATION) EVERY 4 HOURS PRN
COMMUNITY
Start: 2016-11-16 | End: 2018-06-18 | Stop reason: ALTCHOICE

## 2018-06-15 RX ORDER — MULTIVIT WITH MINERALS/LUTEIN
TABLET ORAL
COMMUNITY

## 2018-06-15 RX ORDER — MULTIVITAMIN
CAPSULE ORAL
COMMUNITY

## 2018-06-15 RX ORDER — CALCIUM CARBONATE/VITAMIN D3 500-10/5ML
LIQUID (ML) ORAL
COMMUNITY

## 2018-06-15 RX ORDER — VITAMIN E 268 MG
CAPSULE ORAL
COMMUNITY

## 2018-06-18 ENCOUNTER — OFFICE VISIT (OUTPATIENT)
Dept: FAMILY MEDICINE CLINIC | Facility: CLINIC | Age: 68
End: 2018-06-18
Payer: COMMERCIAL

## 2018-06-18 VITALS
SYSTOLIC BLOOD PRESSURE: 116 MMHG | OXYGEN SATURATION: 98 % | BODY MASS INDEX: 28.02 KG/M2 | HEIGHT: 69 IN | TEMPERATURE: 97.5 F | DIASTOLIC BLOOD PRESSURE: 68 MMHG | HEART RATE: 70 BPM | RESPIRATION RATE: 16 BRPM | WEIGHT: 189.2 LBS

## 2018-06-18 DIAGNOSIS — C61 CARCINOMA OF PROSTATE (HCC): Primary | ICD-10-CM

## 2018-06-18 DIAGNOSIS — E78.5 BORDERLINE HYPERLIPIDEMIA: ICD-10-CM

## 2018-06-18 PROCEDURE — 3008F BODY MASS INDEX DOCD: CPT | Performed by: FAMILY MEDICINE

## 2018-06-18 PROCEDURE — 1101F PT FALLS ASSESS-DOCD LE1/YR: CPT | Performed by: FAMILY MEDICINE

## 2018-06-18 PROCEDURE — 99214 OFFICE O/P EST MOD 30 MIN: CPT | Performed by: FAMILY MEDICINE

## 2018-06-18 PROCEDURE — 1160F RVW MEDS BY RX/DR IN RCRD: CPT | Performed by: FAMILY MEDICINE

## 2018-06-18 NOTE — PROGRESS NOTES
Assessment/Plan:         Diagnoses and all orders for this visit:    Carcinoma of prostate [facility])  Comments:  sees urology yearly    Borderline hyperlipidemia  Comments:  hold off statin, recheck lipids in 3 months, will call with results  Orders:  -     Lipid panel; Future    Other orders  -     Alpha-Lipoic Acid 100 MG TABS; Take by mouth  -     atorvastatin (LIPITOR) 10 mg tablet; Take 1 tablet by mouth daily  -     Flax Oil-Fish Oil-Borage Oil ([employer] OMEGA-3 PO); Take by mouth  -     Misc Natural Products (GLUCOSAMINE CHONDROITIN ADV PO); Take by mouth  -     Multiple Vitamin (MULTIVITAMIN) capsule; Take by mouth  -     Discontinue: neomycin-polymyxin-hydrocortisone (CORTISPORIN) 0 35%-10,000 units/mL-1% otic suspension; Administer 3 drops into ears  -     Discontinue: albuterol (PROAIR HFA) 90 mcg/act inhaler; Inhale 2 puffs every 4 (four) hours as needed  -     Selenium 200 MCG CAPS; Take by mouth  -     Misc Natural Products (TURMERIC CURCUMIN) CAPS; Take by mouth  -     Ascorbic Acid (VITAMIN C) 1000 MG tablet; Take by mouth  -     Cholecalciferol (VITAMIN D3) 5000 units CAPS; Take by mouth  -     vitamin E, tocopherol, 400 units capsule; Take by mouth  -     Zinc 30 MG CAPS; Take by mouth          Subjective:      Patient ID: Shanna Bah is a 75 y o  male  here to review labs, feeling well  Cholesterol improved on the lipitor, though had been out of the med for 4-6 weeks prior to the BW  Stay active, has stress well managed          The following portions of the patient's history were reviewed and updated as appropriate: allergies, current medications, past family history, past medical history, past social history, past surgical history and problem list     Review of Systems      Objective:      /68 (BP Location: Left arm, Patient Position: Sitting, Cuff Size: Standard)   Pulse 70   Temp 97 5 °F (36 4 °C)   Resp 16   Ht 5' 9" (1 753 m)   Wt 85 8 kg (189 lb 3 2 oz)   SpO2 98%   BMI 27 94 kg/m²          Physical Exam   Constitutional: He is oriented to person, place, and time  He appears well-developed and well-nourished  Cardiovascular: Normal rate, regular rhythm and normal heart sounds  Pulmonary/Chest: Effort normal and breath sounds normal    Neurological: He is alert and oriented to person, place, and time  Psychiatric: He has a normal mood and affect   His behavior is normal  Judgment and thought content normal

## 2018-06-19 ENCOUNTER — OFFICE VISIT (OUTPATIENT)
Dept: UROLOGY | Facility: AMBULATORY SURGERY CENTER | Age: 68
End: 2018-06-19
Payer: COMMERCIAL

## 2018-06-19 VITALS
HEIGHT: 69 IN | DIASTOLIC BLOOD PRESSURE: 82 MMHG | WEIGHT: 188 LBS | BODY MASS INDEX: 27.85 KG/M2 | HEART RATE: 72 BPM | SYSTOLIC BLOOD PRESSURE: 124 MMHG

## 2018-06-19 DIAGNOSIS — C61 PROSTATE CANCER (HCC): Primary | ICD-10-CM

## 2018-06-19 PROCEDURE — 99213 OFFICE O/P EST LOW 20 MIN: CPT | Performed by: UROLOGY

## 2018-06-19 RX ORDER — SILDENAFIL CITRATE 20 MG/1
TABLET ORAL
Qty: 90 TABLET | Refills: 0 | Status: SHIPPED | OUTPATIENT
Start: 2018-06-19 | End: 2019-08-13 | Stop reason: ALTCHOICE

## 2018-06-19 NOTE — PROGRESS NOTES
6/19/2018    Trent Villa  1950  [id number]        Assessment  [facility] prostate cancer status post robot assisted laparoscopic prostatectomy, status post adjuvant radiation therapy       Discussion  His PSA remains undetectable less than 0 1 status post prostatectomy followed by radiation  He states that he occasionally has a quality unassisted erection but has requested PD 5 inhibitors  I have prescribed generic sildenafil 20 mg tablets  The patient understands that he may take up to 5 tablets 1 hour prior to intercourse  Follow-up will be in 1 year with his next PSA level  History of Present Illness  75 y o  male with a history of [facility] 7 prostate cancer diagnosed in 2011  He underwent robot assisted laparoscopic prostatectomy  Approximately 1 year later his PSA began to rise  He underwent adjuvant radiation therapy  His PSA since that time has been undetectable less than 0 1  He denies any lower urinary tract symptoms  He states that he voids with a strong urinary stream   He does not have incontinence  He has mild to moderate erectile dysfunction  He occasionally has unassisted erections but has used PDE 5 inhibitors with some success  AUA Symptom Score  AUA SYMPTOM SCORE      Most Recent Value   AUA SYMPTOM SCORE   How often have you had a sensation of not emptying your bladder completely after you finished urinating? 0   How often have you had to urinate again less than two hours after you finished urinating? 0   How often have you found you stopped and started again several times when you urinate?  0   How often have you found it difficult to postpone urination? 0   How often have you had a weak urinary stream?  0   How often have you had to push or strain to begin urination? 0   How many times did you most typically get up to urinate from the time you went to bed at night until the time you got up in the morning?   1   Quality of Life: If you were to spend the rest of your life with your urinary condition just the way it is now, how would you feel about that?  0   AUA SYMPTOM SCORE  1          Review of Systems  Review of Systems   Constitutional: Negative  HENT: Negative  Eyes: Negative  Respiratory: Negative  Cardiovascular: Negative  Gastrointestinal: Negative  Endocrine: Negative  Genitourinary:        Erectile dysfunction   Musculoskeletal: Negative  Skin: Negative  Allergic/Immunologic: Negative  Neurological: Negative  Hematological: Negative  Psychiatric/Behavioral: Negative  All other systems reviewed and are negative  Past Medical History  Past Medical History:   Diagnosis Date    Arthritis of hand     Last Assessed: 10/2/2015    Basal cell carcinoma     Last Assessed: 12/6/2017    Chest pain     Last Assessed: 10/14/2014    Fall from ladder     Last Assessed: 1/13/2017    Ganglion of left wrist     Last Assessed: 10/3/2016    Lump of skin     Lsat Assessed: 2/15/2013    Malignant neoplasm of prostate ([facility] [address] )     Last Assessed: 4/1/2015    Neck mass     Last Assessed: 10/3/2016    Paresthesia of left thumb     Last Assessed: 2/7/2017    Rupture of radial collateral ligament of left thumb     Last Assessed: 2/7/2017       Past Social History  Past Surgical History:   Procedure Laterality Date    PROSTATE SURGERY         Past Family History  Family History   Problem Relation Age of Onset    Cancer Mother     Uterine cancer Mother     Diabetes Father     Congenital heart disease Sister     Lung cancer Brother        Past Social history  Social History     Social History    Marital status: /Civil Union     Spouse name: N/A    Number of children: N/A    Years of education: N/A     Occupational History    Not on file       Social History Main Topics    Smoking status: Former Smoker     Packs/day: 1 50    Smokeless tobacco: Never Used      Comment: quit 40 yrs ago    Alcohol use Yes      Comment: social    Drug use: No    Sexual activity: Not on file     Other Topics Concern    Not on file     Social History Narrative    No narrative on file       Current Medications  Current Outpatient Prescriptions   Medication Sig Dispense Refill    Alpha-Lipoic Acid 100 MG TABS Take by mouth      Ascorbic Acid (VITAMIN C) 1000 MG tablet Take by mouth      Cholecalciferol (VITAMIN D3) 5000 units CAPS Take by mouth      Flax Oil-Fish Oil-Borage Oil ([facility] OMEGA-3 PO) Take by mouth      Misc Natural Products (GLUCOSAMINE CHONDROITIN ADV PO) Take by mouth      Misc Natural Products (TURMERIC CURCUMIN) CAPS Take by mouth      Multiple Vitamin (MULTIVITAMIN) capsule Take by mouth      Selenium 200 MCG CAPS Take by mouth      vitamin E, tocopherol, 400 units capsule Take by mouth      Zinc 30 MG CAPS Take by mouth      atorvastatin (LIPITOR) 10 mg tablet Take 1 tablet by mouth daily      sildenafil (REVATIO) 20 mg tablet Take up to 5, 20 mg tablets (100 mg) one hour prior to intercourse as needed 90 tablet 0     No current facility-administered medications for this visit  Allergies  No Known Allergies    Past Medical History, Social History, Family History, medications and allergies were reviewed  Vitals  Vitals:    06/19/18 0948   BP: 124/82   Pulse: 72   Weight: 85 3 kg (188 lb)   Height: 5' 9" (1 753 m)       Physical Exam  Physical Exam    On examination he is in no acute distress    Gait    Results  Lab Results   Component Value Date    PSA <0 1 06/08/2018    PSA <0 1 05/26/2017    PSA <0 1 10/05/2016     Lab Results   Component Value Date    GLUCOSE 104 10/05/2016    CALCIUM 9 4 06/08/2018     06/08/2018    K 4 7 06/08/2018    CO2 25 06/08/2018     06/08/2018    BUN 21 06/08/2018    CREATININE 1 17 06/08/2018     Lab Results   Component Value Date    WBC 4 51 06/08/2018    HGB 14 8 06/08/2018    HCT 46 0 06/08/2018    MCV 94 06/08/2018     06/08/2018         Office Urine Dip  No results found for this or any previous visit (from the past 1 hour(s))  ]      Total visit time was 15 minutes of which over 50% was spent on counseling

## 2019-01-29 LAB
CHOLEST SERPL-MCNC: 224 MG/DL (ref 100–199)
HDLC SERPL-MCNC: 47 MG/DL
LDLC SERPL CALC-MCNC: 151 MG/DL (ref 0–99)
SL AMB VLDL CHOLESTEROL CALC: 26 MG/DL (ref 5–40)
TRIGL SERPL-MCNC: 129 MG/DL (ref 0–149)

## 2019-03-04 ENCOUNTER — OFFICE VISIT (OUTPATIENT)
Dept: FAMILY MEDICINE CLINIC | Facility: CLINIC | Age: 69
End: 2019-03-04
Payer: COMMERCIAL

## 2019-03-04 VITALS
HEART RATE: 78 BPM | OXYGEN SATURATION: 98 % | BODY MASS INDEX: 29 KG/M2 | WEIGHT: 195.8 LBS | SYSTOLIC BLOOD PRESSURE: 132 MMHG | HEIGHT: 69 IN | DIASTOLIC BLOOD PRESSURE: 82 MMHG | TEMPERATURE: 97.6 F | RESPIRATION RATE: 16 BRPM

## 2019-03-04 DIAGNOSIS — E78.5 BORDERLINE HYPERLIPIDEMIA: Primary | ICD-10-CM

## 2019-03-04 DIAGNOSIS — G56.02 CARPAL TUNNEL SYNDROME OF LEFT WRIST: ICD-10-CM

## 2019-03-04 PROCEDURE — 3008F BODY MASS INDEX DOCD: CPT | Performed by: FAMILY MEDICINE

## 2019-03-04 PROCEDURE — 1160F RVW MEDS BY RX/DR IN RCRD: CPT | Performed by: FAMILY MEDICINE

## 2019-03-04 PROCEDURE — 1036F TOBACCO NON-USER: CPT | Performed by: FAMILY MEDICINE

## 2019-03-04 PROCEDURE — 99214 OFFICE O/P EST MOD 30 MIN: CPT | Performed by: FAMILY MEDICINE

## 2019-03-04 NOTE — PROGRESS NOTES
Assessment/Plan:         Diagnoses and all orders for this visit:    Borderline hyperlipidemia  Comments:  watching diet  Orders:  -     CBC and differential; Future  -     Comprehensive metabolic panel; Future  -     Lipid panel; Future    Carpal tunnel syndrome of left wrist  Comments:  wrist brace at night          Subjective:      Patient ID: Shakila Estrada is a 70 y o  male  Here for f/u, not much exercise  Lipids controlled, LDL has varied, but mostly at goal  C/o lump on L wrist, few months, non tneder  Also notes N/T in first 3 figners of L hand, h/o R carpal tunnel relase  Shakes hands out during the day  Computer work  Following with bautista yearly for h/o skin CA  The following portions of the patient's history were reviewed and updated as appropriate: allergies, current medications, past family history, past medical history, past social history, past surgical history and problem list     Review of Systems      Objective:      /82 (BP Location: Right arm, Patient Position: Sitting, Cuff Size: Standard)   Pulse 78   Temp 97 6 °F (36 4 °C)   Resp 16   Ht 5' 9" (1 753 m)   Wt 88 8 kg (195 lb 12 8 oz)   SpO2 98%   BMI 28 91 kg/m²          Physical Exam   Constitutional: He is oriented to person, place, and time  He appears well-developed and well-nourished  Cardiovascular: Normal rate, regular rhythm and normal heart sounds  Pulmonary/Chest: Effort normal and breath sounds normal    Musculoskeletal:   Ganglion cyst L wrist, ventral side, neg tinels neg phalens   Neurological: He is alert and oriented to person, place, and time  Skin: Skin is warm  Psychiatric: He has a normal mood and affect  His behavior is normal  Judgment and thought content normal    Vitals reviewed

## 2019-06-11 ENCOUNTER — TELEPHONE (OUTPATIENT)
Dept: UROLOGY | Facility: AMBULATORY SURGERY CENTER | Age: 69
End: 2019-06-11

## 2019-07-18 ENCOUNTER — OFFICE VISIT (OUTPATIENT)
Dept: UROLOGY | Facility: AMBULATORY SURGERY CENTER | Age: 69
End: 2019-07-18
Payer: COMMERCIAL

## 2019-07-18 VITALS
HEIGHT: 69 IN | WEIGHT: 183 LBS | DIASTOLIC BLOOD PRESSURE: 86 MMHG | BODY MASS INDEX: 27.11 KG/M2 | HEART RATE: 80 BPM | SYSTOLIC BLOOD PRESSURE: 136 MMHG

## 2019-07-18 DIAGNOSIS — C61 PROSTATE CANCER (HCC): Primary | ICD-10-CM

## 2019-07-18 PROCEDURE — 99213 OFFICE O/P EST LOW 20 MIN: CPT | Performed by: PHYSICIAN ASSISTANT

## 2019-07-18 RX ORDER — DOXYCYCLINE HYCLATE 100 MG
TABLET ORAL
Refills: 0 | COMMUNITY
Start: 2019-07-05 | End: 2019-08-13 | Stop reason: ALTCHOICE

## 2019-07-18 NOTE — PROGRESS NOTES
UROLOGY PROGRESS NOTE   Patient Identifiers: Malena Naidu (MRN [id number])  Date of Service: 7/18/2019    Subjective:     21-year-old man history of [facility] 7 stage II prostate cancer  He had a robotic prostatectomy in 2000 2011 and adjuvant radiation therapy approximately 1 year later  His PSA had been undetectable  And now it is 0 19  He has good urinary control no bone pain or weight loss or other complaints  Patient has  no complaints  Objective:     VITALS:    Vitals:    07/18/19 1351   BP: 136/86   Pulse: 80     AUA SYMPTOM SCORE      Most Recent Value   AUA SYMPTOM SCORE   How often have you had a sensation of not emptying your bladder completely after you finished urinating? 0   How often have you had to urinate again less than two hours after you finished urinating? 0   How often have you found you stopped and started again several times when you urinate?  0   How often have you found it difficult to postpone urination? 0   How often have you had a weak urinary stream?  0   How often have you had to push or strain to begin urination? 0   How many times did you most typically get up to urinate from the time you went to bed at night until the time you got up in the morning?   1   Quality of Life: If you were to spend the rest of your life with your urinary condition just the way it is now, how would you feel about that?  0   AUA SYMPTOM SCORE  1            LABS:  Lab Results   Component Value Date    HGB 14 8 06/08/2018    HCT 46 0 06/08/2018    WBC 4 51 06/08/2018     06/08/2018   ]    Lab Results   Component Value Date     10/03/2014    K 4 7 06/08/2018     06/08/2018    CO2 25 06/08/2018    BUN 21 06/08/2018    CREATININE 1 17 06/08/2018    CALCIUM 9 4 06/08/2018    GLUCOSE 80 10/03/2014   ]        INPATIENT MEDS:    Current Outpatient Medications:     Alpha-Lipoic Acid 100 MG TABS, Take by mouth, Disp: , Rfl:     Ascorbic Acid (VITAMIN C) 1000 MG tablet, Take by mouth, Disp: , Rfl:     Cholecalciferol (VITAMIN D3) 5000 units CAPS, Take by mouth, Disp: , Rfl:     doxycycline hyclate (VIBRA-TABS) 100 mg tablet, TAKE 1 TABLET (100 MG TOTAL) BY MOUTH 2 (TWO) TIMES A DAY WITH MEALS FOR 21 DAYS , Disp: , Rfl: 0    Flax Oil-Fish Oil-Borage Oil ([employer]S OMEGA-3 PO), Take by mouth, Disp: , Rfl:     Misc Natural Products (GLUCOSAMINE CHONDROITIN ADV PO), Take by mouth, Disp: , Rfl:     Misc Natural Products (TURMERIC CURCUMIN) CAPS, Take by mouth, Disp: , Rfl:     Multiple Vitamin (MULTIVITAMIN) capsule, Take by mouth, Disp: , Rfl:     Selenium 200 MCG CAPS, Take by mouth, Disp: , Rfl:     vitamin E, tocopherol, 400 units capsule, Take by mouth, Disp: , Rfl:     atorvastatin (LIPITOR) 10 mg tablet, Take 1 tablet by mouth daily, Disp: , Rfl:     sildenafil (REVATIO) 20 mg tablet, Take up to 5, 20 mg tablets (100 mg) one hour prior to intercourse as needed (Patient not taking: Reported on 3/4/2019), Disp: 90 tablet, Rfl: 0    Zinc 30 MG CAPS, Take by mouth, Disp: , Rfl:       Physical Exam:   /86 (BP Location: Left arm, Patient Position: Sitting, Cuff Size: Adult)   Pulse 80   Ht 5' 9" (1 753 m)   Wt 83 kg (183 lb)   BMI 27 02 kg/m²   GEN: no acute distress    RESP: breathing comfortably with no accessory muscle use    ABD: soft, non-tender, non-distended   INCISION:    EXT: no significant peripheral edema   (Male): Penis circumcised, phallus normal, meatus patent  Testicles descended into scrotum bilaterally without masses nor tenderness  No inguinal hernias bilaterally  DAVID: Prostate is  Surgically absent with no rectal masses    RADIOLOGY:    none     Assessment:    1   Prostate cancer     Plan:   - follow-up in 4 months with Dr Itz Marquez with PSA prior to visit  -  -  -

## 2019-08-13 ENCOUNTER — ANESTHESIA EVENT (OUTPATIENT)
Dept: GASTROENTEROLOGY | Facility: HOSPITAL | Age: 69
End: 2019-08-13

## 2019-08-13 ENCOUNTER — ANESTHESIA (OUTPATIENT)
Dept: GASTROENTEROLOGY | Facility: HOSPITAL | Age: 69
End: 2019-08-13

## 2019-08-13 ENCOUNTER — HOSPITAL ENCOUNTER (OUTPATIENT)
Dept: GASTROENTEROLOGY | Facility: HOSPITAL | Age: 69
Setting detail: OUTPATIENT SURGERY
Discharge: HOME | End: 2019-08-13
Attending: SURGERY | Admitting: SURGERY
Payer: COMMERCIAL

## 2019-08-13 VITALS
HEIGHT: 69 IN | OXYGEN SATURATION: 100 % | DIASTOLIC BLOOD PRESSURE: 64 MMHG | SYSTOLIC BLOOD PRESSURE: 114 MMHG | BODY MASS INDEX: 27.11 KG/M2 | RESPIRATION RATE: 18 BRPM | WEIGHT: 183 LBS | HEART RATE: 54 BPM | TEMPERATURE: 97 F

## 2019-08-13 DIAGNOSIS — Z86.010 HISTORY OF COLONIC POLYPS: ICD-10-CM

## 2019-08-13 PROCEDURE — 88305 TISSUE EXAM BY PATHOLOGIST: CPT | Performed by: PATHOLOGY

## 2019-08-13 RX ORDER — PROPOFOL 10 MG/ML
INJECTION, EMULSION INTRAVENOUS CONTINUOUS PRN
Status: DISCONTINUED | OUTPATIENT
Start: 2019-08-13 | End: 2019-08-13 | Stop reason: SURG

## 2019-08-13 RX ORDER — PROPOFOL 10 MG/ML
INJECTION, EMULSION INTRAVENOUS AS NEEDED
Status: DISCONTINUED | OUTPATIENT
Start: 2019-08-13 | End: 2019-08-13 | Stop reason: SURG

## 2019-08-13 RX ORDER — SODIUM CHLORIDE 9 MG/ML
50 INJECTION, SOLUTION INTRAVENOUS CONTINUOUS
Status: DISCONTINUED | OUTPATIENT
Start: 2019-08-13 | End: 2019-08-17 | Stop reason: HOSPADM

## 2019-08-13 RX ORDER — LIDOCAINE HYDROCHLORIDE 10 MG/ML
INJECTION, SOLUTION INFILTRATION; PERINEURAL AS NEEDED
Status: DISCONTINUED | OUTPATIENT
Start: 2019-08-13 | End: 2019-08-13 | Stop reason: SURG

## 2019-08-13 RX ADMIN — PROPOFOL 50 MG: 10 INJECTION, EMULSION INTRAVENOUS at 10:27

## 2019-08-13 RX ADMIN — SODIUM CHLORIDE 50 ML/HR: 0.9 INJECTION, SOLUTION INTRAVENOUS at 09:31

## 2019-08-13 RX ADMIN — PROPOFOL 50 MG: 10 INJECTION, EMULSION INTRAVENOUS at 10:24

## 2019-08-13 RX ADMIN — SODIUM CHLORIDE: 0.9 INJECTION, SOLUTION INTRAVENOUS at 09:46

## 2019-08-13 RX ADMIN — PROPOFOL 50 MG: 10 INJECTION, EMULSION INTRAVENOUS at 10:26

## 2019-08-13 RX ADMIN — LIDOCAINE HYDROCHLORIDE 40 MG: 10 INJECTION, SOLUTION INFILTRATION; PERINEURAL at 10:23

## 2019-08-13 RX ADMIN — PROPOFOL 80 MCG/KG/MIN: 10 INJECTION, EMULSION INTRAVENOUS at 10:27

## 2019-08-13 RX ADMIN — PROPOFOL 50 MG: 10 INJECTION, EMULSION INTRAVENOUS at 10:23

## 2019-08-13 NOTE — ANESTHESIA POSTPROCEDURE EVALUATION
Post-Op Assessment Note    CV Status:  Stable  Pain Score: 0    Pain management: adequate     Mental Status:  Alert and awake   Hydration Status:  Euvolemic   PONV Controlled:  Controlled   Airway Patency:  Patent   Post Op Vitals Reviewed: Yes      Staff: CRNA           /55 (08/13/19 1042)    Temp     Pulse 63 (08/13/19 1042)   Resp 16 (08/13/19 1042)    SpO2 99 % (08/13/19 1042)

## 2019-08-13 NOTE — ANESTHESIA PREPROCEDURE EVALUATION
Review of Systems/Medical History  Patient summary reviewed  Chart reviewed  No history of anesthetic complications     Cardiovascular  Exercise tolerance (METS): >4,  Hyperlipidemia,    Pulmonary  Negative pulmonary ROS        GI/Hepatic  Negative GI/hepatic ROS          Negative  ROS        Endo/Other  Negative endo/other ROS      GYN  Negative gynecology ROS          Hematology  Negative hematology ROS      Musculoskeletal    Arthritis     Neurology  Negative neurology ROS      Psychology   Negative psychology ROS              Physical Exam    Airway    Mallampati score: I  TM Distance: >3 FB  Neck ROM: full     Dental       Cardiovascular  Rhythm: regular, Rate: normal, Cardiovascular exam normal    Pulmonary  Pulmonary exam normal Breath sounds clear to auscultation,     Other Findings        Anesthesia Plan  ASA Score- 2     Anesthesia Type- IV sedation with anesthesia with ASA Monitors  Additional Monitors:   Airway Plan:         Plan Factors- Instructed to abstain from smoking on day of procedure: N/A  Tracey Oswald Patient smoked on day of surgery: N/A  Tracey Oswald Induction- intravenous  Postoperative Plan-     Informed Consent- Anesthetic plan and risks discussed with patient  I personally reviewed this patient with the CRNA  Discussed and agreed on the Anesthesia Plan with the CRNA  Tracey Oswald

## 2019-08-28 ENCOUNTER — APPOINTMENT (OUTPATIENT)
Dept: LAB | Facility: CLINIC | Age: 69
End: 2019-08-28
Payer: COMMERCIAL

## 2019-08-28 DIAGNOSIS — E78.5 BORDERLINE HYPERLIPIDEMIA: ICD-10-CM

## 2019-08-28 LAB
ALBUMIN SERPL BCP-MCNC: 4.2 G/DL (ref 3.5–5)
ALP SERPL-CCNC: 54 U/L (ref 46–116)
ALT SERPL W P-5'-P-CCNC: 27 U/L (ref 12–78)
ANION GAP SERPL CALCULATED.3IONS-SCNC: 5 MMOL/L (ref 4–13)
AST SERPL W P-5'-P-CCNC: 14 U/L (ref 5–45)
BASOPHILS # BLD AUTO: 0.01 THOUSANDS/ΜL (ref 0–0.1)
BASOPHILS NFR BLD AUTO: 0 % (ref 0–1)
BILIRUB SERPL-MCNC: 0.91 MG/DL (ref 0.2–1)
BUN SERPL-MCNC: 14 MG/DL (ref 5–25)
CALCIUM SERPL-MCNC: 8.8 MG/DL (ref 8.3–10.1)
CHLORIDE SERPL-SCNC: 106 MMOL/L (ref 100–108)
CHOLEST SERPL-MCNC: 199 MG/DL (ref 50–200)
CO2 SERPL-SCNC: 26 MMOL/L (ref 21–32)
CREAT SERPL-MCNC: 1 MG/DL (ref 0.6–1.3)
EOSINOPHIL # BLD AUTO: 0.02 THOUSAND/ΜL (ref 0–0.61)
EOSINOPHIL NFR BLD AUTO: 1 % (ref 0–6)
ERYTHROCYTE [DISTWIDTH] IN BLOOD BY AUTOMATED COUNT: 13.6 % (ref 11.6–15.1)
GFR SERPL CREATININE-BSD FRML MDRD: 76 ML/MIN/1.73SQ M
GLUCOSE P FAST SERPL-MCNC: 92 MG/DL (ref 65–99)
HCT VFR BLD AUTO: 41 % (ref 36.5–49.3)
HDLC SERPL-MCNC: 54 MG/DL (ref 40–60)
HGB BLD-MCNC: 13.2 G/DL (ref 12–17)
IMM GRANULOCYTES # BLD AUTO: 0.01 THOUSAND/UL (ref 0–0.2)
IMM GRANULOCYTES NFR BLD AUTO: 0 % (ref 0–2)
LDLC SERPL CALC-MCNC: 133 MG/DL (ref 0–100)
LYMPHOCYTES # BLD AUTO: 1.33 THOUSANDS/ΜL (ref 0.6–4.47)
LYMPHOCYTES NFR BLD AUTO: 40 % (ref 14–44)
MCH RBC QN AUTO: 30.1 PG (ref 26.8–34.3)
MCHC RBC AUTO-ENTMCNC: 32.2 G/DL (ref 31.4–37.4)
MCV RBC AUTO: 93 FL (ref 82–98)
MONOCYTES # BLD AUTO: 0.41 THOUSAND/ΜL (ref 0.17–1.22)
MONOCYTES NFR BLD AUTO: 12 % (ref 4–12)
NEUTROPHILS # BLD AUTO: 1.55 THOUSANDS/ΜL (ref 1.85–7.62)
NEUTS SEG NFR BLD AUTO: 47 % (ref 43–75)
NONHDLC SERPL-MCNC: 145 MG/DL
NRBC BLD AUTO-RTO: 0 /100 WBCS
PLATELET # BLD AUTO: 225 THOUSANDS/UL (ref 149–390)
PMV BLD AUTO: 11.2 FL (ref 8.9–12.7)
POTASSIUM SERPL-SCNC: 3.9 MMOL/L (ref 3.5–5.3)
PROT SERPL-MCNC: 6.9 G/DL (ref 6.4–8.2)
RBC # BLD AUTO: 4.39 MILLION/UL (ref 3.88–5.62)
SODIUM SERPL-SCNC: 137 MMOL/L (ref 136–145)
TRIGL SERPL-MCNC: 58 MG/DL
WBC # BLD AUTO: 3.33 THOUSAND/UL (ref 4.31–10.16)

## 2019-08-28 PROCEDURE — 80061 LIPID PANEL: CPT

## 2019-08-28 PROCEDURE — 85025 COMPLETE CBC W/AUTO DIFF WBC: CPT

## 2019-08-28 PROCEDURE — 80053 COMPREHEN METABOLIC PANEL: CPT

## 2019-08-28 PROCEDURE — 36415 COLL VENOUS BLD VENIPUNCTURE: CPT

## 2019-09-04 ENCOUNTER — OFFICE VISIT (OUTPATIENT)
Dept: FAMILY MEDICINE CLINIC | Facility: CLINIC | Age: 69
End: 2019-09-04
Payer: COMMERCIAL

## 2019-09-04 ENCOUNTER — APPOINTMENT (OUTPATIENT)
Dept: RADIOLOGY | Facility: MEDICAL CENTER | Age: 69
End: 2019-09-04
Payer: COMMERCIAL

## 2019-09-04 VITALS
WEIGHT: 181.2 LBS | TEMPERATURE: 97.6 F | HEART RATE: 64 BPM | HEIGHT: 69 IN | BODY MASS INDEX: 26.84 KG/M2 | DIASTOLIC BLOOD PRESSURE: 68 MMHG | SYSTOLIC BLOOD PRESSURE: 122 MMHG | OXYGEN SATURATION: 97 %

## 2019-09-04 DIAGNOSIS — R20.2 PARESTHESIA OF LEFT UPPER LIMB: ICD-10-CM

## 2019-09-04 DIAGNOSIS — Z00.00 WELCOME TO MEDICARE PREVENTIVE VISIT: Primary | ICD-10-CM

## 2019-09-04 DIAGNOSIS — E78.5 BORDERLINE HYPERLIPIDEMIA: ICD-10-CM

## 2019-09-04 DIAGNOSIS — Z13.6 SCREENING FOR AAA (ABDOMINAL AORTIC ANEURYSM): ICD-10-CM

## 2019-09-04 DIAGNOSIS — R22.1 PALPABLE MASS OF NECK: ICD-10-CM

## 2019-09-04 DIAGNOSIS — M75.82 TENDONITIS OF LEFT ROTATOR CUFF: ICD-10-CM

## 2019-09-04 PROCEDURE — G0438 PPPS, INITIAL VISIT: HCPCS | Performed by: FAMILY MEDICINE

## 2019-09-04 PROCEDURE — 93000 ELECTROCARDIOGRAM COMPLETE: CPT | Performed by: FAMILY MEDICINE

## 2019-09-04 PROCEDURE — 99214 OFFICE O/P EST MOD 30 MIN: CPT | Performed by: FAMILY MEDICINE

## 2019-09-04 PROCEDURE — 1170F FXNL STATUS ASSESSED: CPT | Performed by: FAMILY MEDICINE

## 2019-09-04 PROCEDURE — 73030 X-RAY EXAM OF SHOULDER: CPT

## 2019-09-04 PROCEDURE — 1125F AMNT PAIN NOTED PAIN PRSNT: CPT | Performed by: FAMILY MEDICINE

## 2019-09-04 RX ORDER — METHYLPREDNISOLONE 4 MG/1
TABLET ORAL
Qty: 21 EACH | Refills: 0 | Status: SHIPPED | OUTPATIENT
Start: 2019-09-04 | End: 2019-12-06 | Stop reason: ALTCHOICE

## 2019-09-04 RX ORDER — METHYLPREDNISOLONE 4 MG/1
TABLET ORAL
Qty: 21 EACH | Refills: 0 | Status: SHIPPED | OUTPATIENT
Start: 2019-09-04 | End: 2019-09-04 | Stop reason: SDUPTHER

## 2019-09-04 NOTE — PROGRESS NOTES
Assessment and Plan:     Problem List Items Addressed This Visit     None      Visit Diagnoses     Welcome to Medicare preventive visit    -  Primary    Relevant Orders    POCT ECG         History of Present Illness:     Patient presents for Welcome to Medicare visit  Patient Care Team:  Jazmine Yi MD as PCP - General  ANASTACIA Kirby MD Tia Carrie, MD Jacky Honer, MD     Review of Systems:     Review of Systems   Constitutional: Negative for activity change  HENT: Negative for congestion and trouble swallowing  Respiratory: Negative for chest tightness and shortness of breath  Cardiovascular: Negative for chest pain and palpitations  Gastrointestinal: Negative for abdominal pain and bowel incontinence  Genitourinary: Negative for difficulty urinating  Musculoskeletal: Positive for arthralgias (L shoulder)  Negative for gait problem  Allergic/Immunologic: Negative for environmental allergies  Neurological: Negative for headaches  Hematological: Negative for adenopathy  Psychiatric/Behavioral: The patient is not nervous/anxious           Problem List:     Patient Active Problem List   Diagnosis    Borderline hyperlipidemia    Carcinoma of prostate [facility])      Past Medical and Surgical History:     Past Medical History:   Diagnosis Date    Arthritis of hand     Last Assessed: 10/2/2015    Basal cell carcinoma 2014    Last Assessed: 12/6/2017    Chest pain     Last Assessed: 10/14/2014    Fall from ladder     Last Assessed: 1/13/2017    Ganglion of left wrist     Last Assessed: 10/3/2016    Lump of skin     Lsat Assessed: 2/15/2013    Lyme disease     Malignant neoplasm of prostate ([facility] [address] ) 2011    Last Assessed: 4/1/2015    Neck mass     Last Assessed: 10/3/2016    Paresthesia of left thumb     Last Assessed: 2/7/2017    Rupture of radial collateral ligament of left thumb     Last Assessed: 2/7/2017     Past Surgical History:   Procedure Laterality Date    CARPAL TUNNEL RELEASE      PROSTATE SURGERY      ROTATOR CUFF REPAIR Left     TOE SURGERY Right     tip of middle toe removed on right foot      Family History:     Family History   Problem Relation Age of Onset    Cancer Mother     Uterine cancer Mother     Diabetes Father     Congenital heart disease Sister     Lung cancer Brother       Social History:     Social History     Tobacco Use   Smoking Status Former Smoker    Packs/day: 1 50   Smokeless Tobacco Never Used   Tobacco Comment    quit 1972     Social History     Substance and Sexual Activity   Alcohol Use Yes    Frequency: 2-4 times a month    Comment: social     Social History     Substance and Sexual Activity   Drug Use No      Medications and Allergies:     Current Outpatient Medications   Medication Sig Dispense Refill    Alpha-Lipoic Acid 100 MG TABS Take by mouth      Ascorbic Acid (VITAMIN C) 1000 MG tablet Take by mouth      Cholecalciferol (VITAMIN D3) 5000 units CAPS Take by mouth      Flax Oil-Fish Oil-Borage Oil ([employer] OMEGA-3 PO) Take by mouth      Lycopene 5 MG CAPS Take 1 capsule by mouth daily      Misc Natural Products (GLUCOSAMINE CHONDROITIN ADV PO) Take by mouth      Misc Natural Products (TURMERIC CURCUMIN) CAPS Take by mouth      Multiple Vitamin (MULTIVITAMIN) capsule Take by mouth      Selenium 200 MCG CAPS Take by mouth      vitamin E, tocopherol, 400 units capsule Take by mouth      Zinc 30 MG CAPS Take by mouth       No current facility-administered medications for this visit  No Known Allergies   Immunizations:     Immunization History   Administered Date(s) Administered    Pneumococcal Polysaccharide PPV23 08/18/2015      Medicare Screening Tests and Risk Assessments:     Michelle Downing is here for his Welcome to Medicare visit  Health Risk Assessment:  Patient rates overall health as good  Patient feels that their physical health rating is Same  Eyesight was rated as Same   Hearing was rated as Same  Patient feels that their emotional and mental health rating is Same  Pain experienced by patient in the last 7 days has been Some  Patient's pain rating has been 4/10  Patient states that he has experienced no weight loss or gain in last 6 months  Emotional/Mental Health:  Patient has not been feeling nervous/anxious  PHQ-9 Depression Screening:    Frequency of the following problems over the past two weeks:      1  Little interest or pleasure in doing things: 0 - not at all      2  Feeling down, depressed, or hopeless: 0 - not at all  PHQ-2 Score: 0          Broken Bones/Falls: Fall Risk Assessment:    In the past year, patient has experienced: No history of falling in past year          Bladder/Bowel:  Patient has not leaked urine accidently in the last six months  Patient reports no loss of bowel control  Immunizations:  Patient has not had a flu vaccination within the last year  Patient has received a pneumonia shot  Patient has not received a shingles shot  Patient has not received tetanus/diphtheria shot  Home Safety:  Patient does not have trouble with stairs inside or outside of their home  Patient currently reports that there are no safety hazards present in home, working smoke alarms, working carbon monoxide detectors  Preventative Screenings:   prostate cancer screen performed, colon cancer screen completed, cholesterol screen completed, glaucoma eye exam completed, (Additional Comments: Eye: Dr Lida Wagoner  Prostate: Dr Myra Rubin: Luis, 2019)    Nutrition:  Current diet: Regular with servings of the following:    Medications:  Patient is currently taking over-the-counter supplements  Patient is able to manage medications  Lifestyle Choices:  Patient reports no tobacco use  Patient has smoked or used tobacco in the past   Patient has stopped his tobacco use  Patient reports alcohol use  Patient drives a vehicle  Patient wears seat belt  Activities of Daily Living:  Can get out of bed by his or her self, able to dress self, able to make own meals, able to do own shopping, able to bathe self, can do own laundry/housekeeping, can manage own money, pay bills and track expenses    Previous Hospitalizations:  No hospitalization or ED visit in past 12 months        Advanced Directives:  Patient has not decided on power of   Patient has not completed advanced directive  Additional Comments: Family knows wishes, no paper documentation    Preventative Screening/Counseling:      Cardiovascular:      General: Screening Current          Diabetes:      General: Screening Current          Colorectal Cancer:      General: Screening Current          Prostate Cancer:      General: Screening Current          Osteoporosis:      General: Screening Not Indicated          AAA:  Male patient with age over [de-identified] years  Patient has history of tobacco use  Study: Screening US          Glaucoma:      General: Screening Current          HIV:      General: Screening Not Indicated          Hepatitis C:      General: Screening Not Indicated        Advanced Directives:   Patient has no living will for healthcare, does not have durable POA for healthcare, patient does not have an advanced directive  Information on ACP and/or AD provided  5 wishes given  Immunizations:      Influenza: Patient Declines      Pneumococcal: Lifetime Vaccine Completed           Visual Acuity Screening    Right eye Left eye Both eyes   Without correction: 20/20 20/30 20/20   With correction:           Physical Exam:     /68 (BP Location: Right arm, Patient Position: Sitting, Cuff Size: Standard)   Pulse 64   Temp 97 6 °F (36 4 °C)   Ht 5' 9" (1 753 m)   Wt 82 2 kg (181 lb 3 2 oz)   SpO2 97%   BMI 26 76 kg/m²     Physical Exam   Constitutional: He is oriented to person, place, and time  He appears well-developed and well-nourished     HENT:   Right Ear: External ear normal    Left Ear: External ear normal    Mouth/Throat: Oropharynx is clear and moist    Eyes: Pupils are equal, round, and reactive to light  Conjunctivae are normal    Neck: Normal range of motion  Neck supple  No thyromegaly present  Cardiovascular: Normal rate, regular rhythm and normal heart sounds  Pulmonary/Chest: Effort normal and breath sounds normal    Abdominal: Soft  Bowel sounds are normal  He exhibits no mass  There is no tenderness  Musculoskeletal: Normal range of motion  He exhibits no edema or tenderness  Lymphadenopathy:     He has no cervical adenopathy  Neurological: He is alert and oriented to person, place, and time  He displays normal reflexes  He exhibits normal muscle tone  Coordination normal    Skin: Skin is warm  Capillary refill takes less than 2 seconds  Psychiatric: He has a normal mood and affect   His behavior is normal  Judgment and thought content normal

## 2019-09-04 NOTE — PROGRESS NOTES
Assessment/Plan:    BMI Counseling: Body mass index is 26 76 kg/m²  Discussed the patient's BMI with him  The BMI is above average  BMI counseling and education was provided to the patient  Nutrition recommendations include reducing portion sizes, 3-5 servings of fruits/vegetables daily, reducing fast food intake, consuming healthier snacks, moderation in carbohydrate intake and increasing intake of lean protein  Exercise recommendations include moderate aerobic physical activity for 150 minutes/week, exercising 3-5 times per week and strength training exercises  Diagnoses and all orders for this visit:    Welcome to Medicare preventive visit  -     POCT ECG    Borderline hyperlipidemia  -     CBC and differential; Future  -     Comprehensive metabolic panel; Future  -     Lipid panel; Future    Paresthesia of left upper limb  -     XR shoulder 2+ vw left; Future  -     Ambulatory referral to Orthopedic Surgery; Future  -     Discontinue: methylPREDNISolone 4 MG tablet therapy pack; Use as directed on package    Tendonitis of left rotator cuff  -     XR shoulder 2+ vw left; Future  -     Ambulatory referral to Orthopedic Surgery; Future  -     Discontinue: methylPREDNISolone 4 MG tablet therapy pack; Use as directed on package    Screening for AAA (abdominal aortic aneurysm)  -     US abdominal aorta screening aaa; Future    Palpable mass of neck  -     US head neck soft tissue; Future          Subjective:      Patient ID: Chidi Lowe is a 78 y o  male  L shoulder pain x 4-6 weeks worsening, no known trauma, R handed, intermittent numbness and tingling down to all the fingertips  Has to shake out the LUE, worse at night  Soreness with bearing weight on the arm, movement  no weakness  occ heavy lifting  Shoulder tender to the touch  Hasn't used any meds, but heat helps  Mass in neck seems to be getting larger, last US 3 yrs ago         The following portions of the patient's history were reviewed and updated as appropriate: allergies, current medications, past family history, past medical history, past social history, past surgical history and problem list     Review of Systems   Constitutional: Negative for activity change, fatigue, fever and unexpected weight change  HENT: Negative for congestion and trouble swallowing  Respiratory: Negative for chest tightness and shortness of breath  Gastrointestinal: Negative for abdominal pain  Musculoskeletal: Positive for arthralgias  Neurological: Positive for numbness  Negative for weakness  Hematological: Negative for adenopathy  Objective:      /68 (BP Location: Right arm, Patient Position: Sitting, Cuff Size: Standard)   Pulse 64   Temp 97 6 °F (36 4 °C)   Ht 5' 9" (1 753 m)   Wt 82 2 kg (181 lb 3 2 oz)   SpO2 97%   BMI 26 76 kg/m²          Physical Exam   Constitutional: He is oriented to person, place, and time  He appears well-developed and well-nourished  Cardiovascular: Normal rate, regular rhythm and normal heart sounds  Pulmonary/Chest: Effort normal and breath sounds normal  He exhibits tenderness  Musculoskeletal: He exhibits tenderness (over [facility] joint, superior aspect of L shoulder)  He exhibits no edema  ROM limited about L shoulder, strength intact, no atrophy   Lymphadenopathy:     He has cervical adenopathy (mass below area of thyroid, soft, well circumscribed, non tender, no skin markings)  Neurological: He is alert and oriented to person, place, and time  He displays normal reflexes  A sensory deficit is present  He exhibits normal muscle tone  Coordination normal    Skin: Skin is warm  Psychiatric: He has a normal mood and affect  His behavior is normal  Judgment and thought content normal    Vitals reviewed

## 2019-11-13 ENCOUNTER — APPOINTMENT (OUTPATIENT)
Dept: LAB | Facility: CLINIC | Age: 69
End: 2019-11-13
Payer: COMMERCIAL

## 2019-11-13 DIAGNOSIS — C61 PROSTATE CANCER (HCC): ICD-10-CM

## 2019-11-13 LAB — PSA SERPL-MCNC: 0.3 NG/ML (ref 0–4)

## 2019-11-13 PROCEDURE — 84153 ASSAY OF PSA TOTAL: CPT

## 2019-11-19 ENCOUNTER — TELEPHONE (OUTPATIENT)
Dept: SURGICAL ONCOLOGY | Facility: CLINIC | Age: 69
End: 2019-11-19

## 2019-11-19 ENCOUNTER — OFFICE VISIT (OUTPATIENT)
Dept: UROLOGY | Facility: AMBULATORY SURGERY CENTER | Age: 69
End: 2019-11-19
Payer: COMMERCIAL

## 2019-11-19 VITALS
HEIGHT: 69 IN | BODY MASS INDEX: 28.14 KG/M2 | HEART RATE: 65 BPM | DIASTOLIC BLOOD PRESSURE: 60 MMHG | WEIGHT: 190 LBS | SYSTOLIC BLOOD PRESSURE: 110 MMHG

## 2019-11-19 DIAGNOSIS — C61 PROSTATE CANCER (HCC): Primary | ICD-10-CM

## 2019-11-19 PROCEDURE — 99214 OFFICE O/P EST MOD 30 MIN: CPT | Performed by: UROLOGY

## 2019-11-19 NOTE — PROGRESS NOTES
11/19/2019    Yessenia Tellez Allen  1950  [id number]        Assessment  Nhi 7 prostate cancer status post robot assisted laparoscopic prostatectomy, status post adjuvant radiation therapy secondary to rising PSA, current PSA 0 3      Discussion  We discussed his slowly rising PSA which is now 0 3  I recommend that he return in 3 months time with his next PSA level  I would hold on androgen deprivation at this time  We discussed androgen deprivation therapy at length in the office today  I recommend a referral to Medical Oncology        History of Present Illness  78 y o  male with a history of Nhi 7 prostate cancer  He underwent robot assisted laparoscopic prostatectomy in 2011  His PSA then became detectable and he received adjuvant radiation therapy  His PSA then went to undetectable and more recently rainer to 0 2  He returns in follow-up today and is PSA is 0 3  He is asymptomatic  He has moderate erectile dysfunction but can occasionally have an assisted erection  AUA Symptom Score  AUA SYMPTOM SCORE      Most Recent Value   AUA SYMPTOM SCORE   How often have you had a sensation of not emptying your bladder completely after you finished urinating? 0   How often have you had to urinate again less than two hours after you finished urinating? 3   How often have you found you stopped and started again several times when you urinate?  0   How often have you found it difficult to postpone urination? 0   How often have you had a weak urinary stream?  0   How often have you had to push or strain to begin urination? 0   How many times did you most typically get up to urinate from the time you went to bed at night until the time you got up in the morning?   1   Quality of Life: If you were to spend the rest of your life with your urinary condition just the way it is now, how would you feel about that?  0   AUA SYMPTOM SCORE  4          Review of Systems  Review of Systems      Past Medical History  Past Medical History:   Diagnosis Date    Arthritis of hand     Last Assessed: 10/2/2015    Basal cell carcinoma 2014    Last Assessed: 12/6/2017    Chest pain     Last Assessed: 10/14/2014    Fall from ladder     Last Assessed: 1/13/2017    Ganglion of left wrist     Last Assessed: 10/3/2016    Lump of skin     Lsat Assessed: 2/15/2013    Lyme disease     Malignant neoplasm of prostate ([facility] [address] ) 2011    Last Assessed: 4/1/2015    Neck mass     Last Assessed: 10/3/2016    Paresthesia of left thumb     Last Assessed: 2/7/2017    Rupture of radial collateral ligament of left thumb     Last Assessed: 2/7/2017       Past Social History  Past Surgical History:   Procedure Laterality Date    CARPAL TUNNEL RELEASE      PROSTATE SURGERY      ROTATOR CUFF REPAIR Left     TOE SURGERY Right     tip of middle toe removed on right foot       Past Family History  Family History   Problem Relation Age of Onset    Cancer Mother     Uterine cancer Mother     Diabetes Father     Congenital heart disease Sister     Lung cancer Brother        Past Social history  Social History     Socioeconomic History    Marital status: /Civil Union     Spouse name: Not on file    Number of children: Not on file    Years of education: Not on file    Highest education level: Not on file   Occupational History    Not on file   Social Needs    Financial resource strain: Not on file    Food insecurity:     Worry: Not on file     Inability: Not on file    Transportation needs:     Medical: Not on file     Non-medical: Not on file   Tobacco Use    Smoking status: Former Smoker     Packs/day: 1 50    Smokeless tobacco: Never Used    Tobacco comment: quit 1972   Substance and Sexual Activity    Alcohol use: Yes     Frequency: 2-4 times a month     Comment: social    Drug use: No    Sexual activity: Not on file   Lifestyle    Physical activity:     Days per week: Not on file     Minutes per session: Not on file  Stress: Not on file   Relationships    Social connections:     Talks on phone: Not on file     Gets together: Not on file     Attends Caoda[employer] service: Not on file     Active member of club or organization: Not on file     Attends meetings of clubs or organizations: Not on file     Relationship status: Not on file    Intimate partner violence:     Fear of current or ex partner: Not on file     Emotionally abused: Not on file     Physically abused: Not on file     Forced sexual activity: Not on file   Other Topics Concern    Not on file   Social History Narrative    Not on file       Current Medications  Current Outpatient Medications   Medication Sig Dispense Refill    Alpha-Lipoic Acid 100 MG TABS Take by mouth      Ascorbic Acid (VITAMIN C) 1000 MG tablet Take by mouth      Cholecalciferol (VITAMIN D3) 5000 units CAPS Take by mouth      Flax Oil-Fish Oil-Borage Oil ([employer] OMEGA-3 PO) Take by mouth      Lycopene 5 MG CAPS Take 1 capsule by mouth daily      methylPREDNISolone 4 MG tablet therapy pack Use as directed on package 21 each 0    Misc Natural Products (GLUCOSAMINE CHONDROITIN ADV PO) Take by mouth      Misc Natural Products (TURMERIC CURCUMIN) CAPS Take by mouth      Multiple Vitamin (MULTIVITAMIN) capsule Take by mouth      Selenium 200 MCG CAPS Take by mouth      vitamin E, tocopherol, 400 units capsule Take by mouth      Zinc 30 MG CAPS Take by mouth       No current facility-administered medications for this visit  Allergies  No Known Allergies    Past Medical History, Social History, Family History, medications and allergies were reviewed  Vitals  Vitals:    11/19/19 1341   BP: 110/60   BP Location: Left arm   Patient Position: Sitting   Cuff Size: Adult   Pulse: 65   Weight: 86 2 kg (190 lb)   Height: 5' 9" (1 753 m)       Physical Exam  Physical Exam    On examination he is in no acute distress  His abdomen is soft nontender nondistended     examination reveals normal phallus, scrotum and scrotal contents  Digital rectal examination reveals the prostate is absent  There is no palpable tissue  Skin is warm  Extremities without edema    Neurologic is grossly intact and nonfocal   Gait normal   Affect normal    Results  Lab Results   Component Value Date    PSA 0 3 11/13/2019    PSA <0 1 06/08/2018    PSA <0 1 05/26/2017     Lab Results   Component Value Date    GLUCOSE 80 10/03/2014    CALCIUM 8 8 08/28/2019     10/03/2014    K 3 9 08/28/2019    CO2 26 08/28/2019     08/28/2019    BUN 14 08/28/2019    CREATININE 1 00 08/28/2019     Lab Results   Component Value Date    WBC 3 33 (L) 08/28/2019    HGB 13 2 08/28/2019    HCT 41 0 08/28/2019    MCV 93 08/28/2019     08/28/2019         Office Urine Dip  No results found for this or any previous visit (from the past 1 hour(s)) ]

## 2019-11-19 NOTE — TELEPHONE ENCOUNTER
New Patient Encounter    New Patient Intake Form   Patient Details:  Darwin Cook  1950  [id number]    Background Information:  [address] starts by opening a telephone encounter and gathering the following information   Who is calling to schedule? If not self, relationship to patient? provider   Referring Provider richard   What is the diagnosis? Elevated psa   When was the diagnosis? 11/2019   Is patient aware of diagnosis? Yes   Reason for visit? NP DX   Have you had any testing done? If so: when, where? Yes   Are records in [place]? yes   Was the patient told to bring a disk? no   Scheduling Information:   Preferred [place]:  [place] [id number]     Requesting Specific Provider? no   Are there any dates/time the patient cannot be seen? no      Miscellaneous: n/a   After completing the above information, please route to Financial Counselor and the appropriate Nurse Navigator for review

## 2019-12-06 ENCOUNTER — CONSULT (OUTPATIENT)
Dept: HEMATOLOGY ONCOLOGY | Facility: CLINIC | Age: 69
End: 2019-12-06
Payer: COMMERCIAL

## 2019-12-06 VITALS
SYSTOLIC BLOOD PRESSURE: 126 MMHG | BODY MASS INDEX: 28.14 KG/M2 | HEART RATE: 57 BPM | TEMPERATURE: 97.3 F | WEIGHT: 190 LBS | RESPIRATION RATE: 18 BRPM | OXYGEN SATURATION: 97 % | HEIGHT: 69 IN | DIASTOLIC BLOOD PRESSURE: 70 MMHG

## 2019-12-06 DIAGNOSIS — R22.2 SOFT TISSUE SWELLING OF CHEST WALL: ICD-10-CM

## 2019-12-06 DIAGNOSIS — M25.512 ACUTE PAIN OF LEFT SHOULDER: ICD-10-CM

## 2019-12-06 DIAGNOSIS — C61 CARCINOMA OF PROSTATE (HCC): Primary | ICD-10-CM

## 2019-12-06 PROCEDURE — 99204 OFFICE O/P NEW MOD 45 MIN: CPT | Performed by: INTERNAL MEDICINE

## 2019-12-06 NOTE — PROGRESS NOTES
12/6/2019    Gaby Jensen was seen in consultation today in regards to prostate cancer with rising PSA  He is 78years old and has been feeling well  PSA was noted to be elevated at 0 19 on July 5, 2019 and then elevated to 0 3 on November 13, 2019  He has noted increase in left shoulder discomfort in the past 6 weeks aggravated by raising the left arm above the horizontal or reaching behind his back  Hematology/Oncology History:    1  Robotic assisted laparoscopic prostatectomy, for prostate cancer Gibson score 7 in July, 2011, focal positive surgical margin x2  When PSA became detectable he received adjuvant radiation therapy in 2012  (He has not received androgen deprivation therapy )    2  Cecal polyp (tubular adenoma) on colonoscopy done by Dr Akhil Lu on March August 13, 2019, surveillance colonoscopy in 3 years is recommended  3  Basal cell skin cancer involving the left eyelid and scalp    Review of Systems:     General:  He notes mild fatigue but does not take any naps during the daytime, no chills or swaets  Head and Neck: No nosebleeds, no oral cavity or throat soreness  Cardiovascular: No chest pain, no lower extremity edema  Respriatory: No cough or dyspnea  GI: Appetite is good, no abdominal pain, bowel habits formed and regular  :  He notes nocturia 1-2 times  Musculoskeletal:  He has chronic low back pain and chronic left shoulder pain  Skin: No skin rash  See HPI  He follows with his dermatologist Dr Joey Spangler  Neurological: No headache, no weakness    He notes tingling of the left hand  Hematologic: No easy bruising  Psychiatric: No emotional problems    Medications:    Current Outpatient Medications   Medication Sig Dispense Refill    Alpha-Lipoic Acid 100 MG TABS Take by mouth      Ascorbic Acid (VITAMIN C) 1000 MG tablet Take by mouth      Cholecalciferol (VITAMIN D3) 5000 units CAPS Take by mouth      Flax Oil-Fish Oil-Borage Oil ([employer] OMEGA-3 PO) Take by mouth      Lycopene 5 MG CAPS Take 1 capsule by mouth daily      Misc Natural Products (GLUCOSAMINE CHONDROITIN ADV PO) Take by mouth      Misc Natural Products (TURMERIC CURCUMIN) CAPS Take by mouth      Multiple Vitamin (MULTIVITAMIN) capsule Take by mouth      Selenium 200 MCG CAPS Take by mouth      vitamin E, tocopherol, 400 units capsule Take by mouth      Zinc 30 MG CAPS Take by mouth       No current facility-administered medications for this visit  Past Medical History:    Arthritis involving the left shoulder    Past Surgical History:    Arthroscopic surgery of left shoulder  Right wrist carpal tunnel surgery    Family History:    His mother  of cervical cancer at age 46  His father  of complications of diabetes mellitus at age 64  One brother  of lung cancer at age 61  His other brother age 68 is in good health  His sister  age 15 with congenital heart disease  He has 3 daughters and 2 sons, all 5 of his children are in good health  Social History:    He is  and lives with his wife  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  He has smoked 1 pack of day of cigarettes for 7 years and quit in   He has 1 glass of wine and 6 oz of beer at week per most    Physical Examination:    /70 (BP Location: Left arm)   Pulse 57   Temp (!) 97 3 °F (36 3 °C) (Tympanic)   Resp 18   Ht 5' 9" (1 753 m)   Wt 86 2 kg (190 lb)   SpO2 97%   BMI 28 06 kg/m²      General appearance: Appears well  Head: Normocephalic  Eyes: Extraocular movements intact  Ears: No gross hearing deficit  Oropharynx: Clear  Neck: Supple, No lymphadenopathy  Chest: No axillary adenopathy  There is mild soft tissue swelling of the upper sternal area without discrete mass  Lungs: Clear to auscultation bilaterally  Heart: Regular rate and rhythm  Abdomen: No hepatic or splenic enlargement;  No inguinal  lymphadenopathy  Extremities: No lower extremity edema bilaterally  Skin:  He has a mild folliculitis of the upper chest   No ecchymoses  Neurologic: Grossly intact, no focal neurological deficit  Psychiatric: Oriented to person, place and time, normal mood and affect    ECOG 0    Laboratory:    From August 28, 2019:  Creatinine 1 0, calcium 8 8, AST 14, ALT 27, alk-phos 54, bili 0 91, WBC 3 33 with ANC 1 55, hemoglobin 13 2, platelets 950, WBC differential neutrophils 47%, lymphs 40%, monos 12%, eos 1%    Left shoulder x-ray from September 4, 2019:  No lytic or blastic lesions seen    Assessment:    Rising PSA post salvage radiation therapy in 2012 for prostate cancer, [place] score 7  There has been increase in left shoulder pain in the past 6 weeks most likely on a degenerative basis  There is mild soft tissue swelling of the proximal sternum without discrete mass  Recommendations: According to NCCN guidelines version 4 2019, in individual with PSA persistent/recurrence who is a candidate for local therapy, life expectancy > 10 years, and PSA < 10, risk stratification with PSA doubling time, bone imaging, prostate MRI, transrectal ultrasound biopsy, and consider CT of chest, abdomen and pelvis or C-11 choline or F-18 flucicovine PET-CT  Specifically, PSA persistence/recurrence is standardly defined by PSA> 2 ng/mL or more above the vipul PSA after external beam radiotherapy, however, a recurrence evaluation should be considered when PSA has been confirmed to be increasing after radiation even if the increase above vipul is not yet 2 ng/mL, especially in those who are young and healthy and especially in the case of rapid rise of PSA  Therefore, the standard course of action at this time is that of observation as has been recommended by the patient's urologist Dr Nati Aguilar  The patient is already scheduled for follow-up with Dr April Weldon on February 27, 2020 with PSA at that time    However in this 50-year-old gentleman without significant comorbid disease and a PSA doubling time of approximately 5 months, another option is to begin diagnostic testing to rule out widespread disease  As PET imaging is generally reserved for PSA > 2 ng/mL, initial restaging with CT of chest, abdomen pelvis and bone scan  The patient is in favor of restaging at this time  Contrast enhanced CT of the chest abdomen pelvis has been ordered and if unremarkable then bone scan  The patient is aware seek medical attention for progressive left shoulder pain, progressive swelling over the upper sternal area, excessive fatigue, or if other new problems arise  Otherwise, plan to see him again in 3 months  Today's office visit required 45 minutes, over 50% of the time was utilized to review diagnostic tests, impressions and recommendations

## 2019-12-06 NOTE — PATIENT INSTRUCTIONS
The patient is aware seek medical attention for progressive left shoulder pain, progressive swelling over the upper sternal area, excessive fatigue, or if other new problems arise

## 2019-12-12 ENCOUNTER — APPOINTMENT (OUTPATIENT)
Dept: LAB | Facility: CLINIC | Age: 69
End: 2019-12-12
Payer: COMMERCIAL

## 2019-12-12 DIAGNOSIS — C61 CARCINOMA OF PROSTATE (HCC): Primary | ICD-10-CM

## 2019-12-12 DIAGNOSIS — C61 CARCINOMA OF PROSTATE (HCC): ICD-10-CM

## 2019-12-12 LAB
ALBUMIN SERPL BCP-MCNC: 4.6 G/DL (ref 3.5–5)
ALP SERPL-CCNC: 57 U/L (ref 46–116)
ALT SERPL W P-5'-P-CCNC: 33 U/L (ref 12–78)
ANION GAP SERPL CALCULATED.3IONS-SCNC: 2 MMOL/L (ref 4–13)
AST SERPL W P-5'-P-CCNC: 16 U/L (ref 5–45)
BILIRUB SERPL-MCNC: 0.75 MG/DL (ref 0.2–1)
BUN SERPL-MCNC: 21 MG/DL (ref 5–25)
CALCIUM SERPL-MCNC: 9.3 MG/DL (ref 8.3–10.1)
CHLORIDE SERPL-SCNC: 108 MMOL/L (ref 100–108)
CO2 SERPL-SCNC: 29 MMOL/L (ref 21–32)
CREAT SERPL-MCNC: 1.11 MG/DL (ref 0.6–1.3)
ERYTHROCYTE [DISTWIDTH] IN BLOOD BY AUTOMATED COUNT: 13.4 % (ref 11.6–15.1)
GFR SERPL CREATININE-BSD FRML MDRD: 67 ML/MIN/1.73SQ M
GLUCOSE P FAST SERPL-MCNC: 93 MG/DL (ref 65–99)
HCT VFR BLD AUTO: 43.5 % (ref 36.5–49.3)
HGB BLD-MCNC: 14.2 G/DL (ref 12–17)
MCH RBC QN AUTO: 30.5 PG (ref 26.8–34.3)
MCHC RBC AUTO-ENTMCNC: 32.6 G/DL (ref 31.4–37.4)
MCV RBC AUTO: 93 FL (ref 82–98)
PLATELET # BLD AUTO: 224 THOUSANDS/UL (ref 149–390)
PMV BLD AUTO: 11.2 FL (ref 8.9–12.7)
POTASSIUM SERPL-SCNC: 4.7 MMOL/L (ref 3.5–5.3)
PROT SERPL-MCNC: 7.1 G/DL (ref 6.4–8.2)
RBC # BLD AUTO: 4.66 MILLION/UL (ref 3.88–5.62)
SODIUM SERPL-SCNC: 139 MMOL/L (ref 136–145)
WBC # BLD AUTO: 4.15 THOUSAND/UL (ref 4.31–10.16)

## 2019-12-12 PROCEDURE — 80053 COMPREHEN METABOLIC PANEL: CPT

## 2019-12-12 PROCEDURE — 36415 COLL VENOUS BLD VENIPUNCTURE: CPT

## 2019-12-12 PROCEDURE — 85027 COMPLETE CBC AUTOMATED: CPT

## 2019-12-19 ENCOUNTER — HOSPITAL ENCOUNTER (OUTPATIENT)
Dept: CT IMAGING | Facility: HOSPITAL | Age: 69
Discharge: HOME | End: 2019-12-19
Attending: INTERNAL MEDICINE
Payer: COMMERCIAL

## 2019-12-19 DIAGNOSIS — M25.512 ACUTE PAIN OF LEFT SHOULDER: ICD-10-CM

## 2019-12-19 DIAGNOSIS — R22.2 SOFT TISSUE SWELLING OF CHEST WALL: ICD-10-CM

## 2019-12-19 DIAGNOSIS — C61 CARCINOMA OF PROSTATE (HCC): ICD-10-CM

## 2019-12-19 PROCEDURE — 71260 CT THORAX DX C+: CPT

## 2019-12-19 PROCEDURE — 74177 CT ABD & PELVIS W/CONTRAST: CPT

## 2019-12-19 RX ADMIN — IOHEXOL 100 ML: 350 INJECTION, SOLUTION INTRAVENOUS at 16:54

## 2019-12-23 ENCOUNTER — TELEPHONE (OUTPATIENT)
Dept: HEMATOLOGY ONCOLOGY | Facility: CLINIC | Age: 69
End: 2019-12-23

## 2019-12-30 NOTE — RESULT ENCOUNTER NOTE
Martha Wilson:  Please tell patient that CT scan of chest, abdomen pelvis from December 19, 2019 (read on December 27, 2019) reveals no evidence of metastatic prostate cancer (this is favorable)  Please schedule whole-body bone scan as previously discussed at the time of office visit December 6, 2019   (The bone scan is already ordered in Epic medical record )  Thank you, Julio Cesar Trinh

## 2019-12-30 NOTE — TELEPHONE ENCOUNTER
Patient called back on December 30, 2019:    CBC, CMP, and CT scan of chest abdomen pelvis were reviewed  There is no evidence of metastatic disease or local recurrence within the prostate bed  Whole-body bone scan is recommended which is to be scheduled in the near future  The patient is aware seek medical attention for progressive left shoulder pain, progressive swelling over the upper sternal area, excessive fatigue, or if other new problems arise

## 2020-01-06 DIAGNOSIS — C61 PROSTATE CANCER (HCC): Primary | ICD-10-CM

## 2020-02-26 ENCOUNTER — APPOINTMENT (OUTPATIENT)
Dept: LAB | Facility: CLINIC | Age: 70
End: 2020-02-26
Payer: COMMERCIAL

## 2020-02-26 DIAGNOSIS — E78.5 BORDERLINE HYPERLIPIDEMIA: ICD-10-CM

## 2020-02-26 DIAGNOSIS — C61 PROSTATE CANCER (HCC): ICD-10-CM

## 2020-02-26 DIAGNOSIS — C61 CARCINOMA OF PROSTATE (HCC): ICD-10-CM

## 2020-02-26 LAB
ALBUMIN SERPL BCP-MCNC: 3.9 G/DL (ref 3.5–5)
ALP SERPL-CCNC: 56 U/L (ref 46–116)
ALT SERPL W P-5'-P-CCNC: 35 U/L (ref 12–78)
ANION GAP SERPL CALCULATED.3IONS-SCNC: 3 MMOL/L (ref 4–13)
AST SERPL W P-5'-P-CCNC: 18 U/L (ref 5–45)
BASOPHILS # BLD AUTO: 0.02 THOUSANDS/ΜL (ref 0–0.1)
BASOPHILS NFR BLD AUTO: 0 % (ref 0–1)
BILIRUB SERPL-MCNC: 0.43 MG/DL (ref 0.2–1)
BUN SERPL-MCNC: 21 MG/DL (ref 5–25)
CALCIUM SERPL-MCNC: 8.9 MG/DL (ref 8.3–10.1)
CHLORIDE SERPL-SCNC: 110 MMOL/L (ref 100–108)
CHOLEST SERPL-MCNC: 203 MG/DL (ref 50–200)
CO2 SERPL-SCNC: 28 MMOL/L (ref 21–32)
CREAT SERPL-MCNC: 1.12 MG/DL (ref 0.6–1.3)
EOSINOPHIL # BLD AUTO: 0.02 THOUSAND/ΜL (ref 0–0.61)
EOSINOPHIL NFR BLD AUTO: 0 % (ref 0–6)
ERYTHROCYTE [DISTWIDTH] IN BLOOD BY AUTOMATED COUNT: 13.6 % (ref 11.6–15.1)
GFR SERPL CREATININE-BSD FRML MDRD: 66 ML/MIN/1.73SQ M
GLUCOSE P FAST SERPL-MCNC: 86 MG/DL (ref 65–99)
HCT VFR BLD AUTO: 42.1 % (ref 36.5–49.3)
HDLC SERPL-MCNC: 45 MG/DL
HGB BLD-MCNC: 13.6 G/DL (ref 12–17)
IMM GRANULOCYTES # BLD AUTO: 0.06 THOUSAND/UL (ref 0–0.2)
IMM GRANULOCYTES NFR BLD AUTO: 1 % (ref 0–2)
LDLC SERPL CALC-MCNC: 138 MG/DL (ref 0–100)
LYMPHOCYTES # BLD AUTO: 1.8 THOUSANDS/ΜL (ref 0.6–4.47)
LYMPHOCYTES NFR BLD AUTO: 37 % (ref 14–44)
MCH RBC QN AUTO: 30.6 PG (ref 26.8–34.3)
MCHC RBC AUTO-ENTMCNC: 32.3 G/DL (ref 31.4–37.4)
MCV RBC AUTO: 95 FL (ref 82–98)
MONOCYTES # BLD AUTO: 0.4 THOUSAND/ΜL (ref 0.17–1.22)
MONOCYTES NFR BLD AUTO: 8 % (ref 4–12)
NEUTROPHILS # BLD AUTO: 2.62 THOUSANDS/ΜL (ref 1.85–7.62)
NEUTS SEG NFR BLD AUTO: 54 % (ref 43–75)
NONHDLC SERPL-MCNC: 158 MG/DL
NRBC BLD AUTO-RTO: 0 /100 WBCS
PLATELET # BLD AUTO: 202 THOUSANDS/UL (ref 149–390)
PMV BLD AUTO: 11.3 FL (ref 8.9–12.7)
POTASSIUM SERPL-SCNC: 4.5 MMOL/L (ref 3.5–5.3)
PROT SERPL-MCNC: 6.9 G/DL (ref 6.4–8.2)
PSA SERPL-MCNC: 0.3 NG/ML (ref 0–4)
RBC # BLD AUTO: 4.44 MILLION/UL (ref 3.88–5.62)
SODIUM SERPL-SCNC: 141 MMOL/L (ref 136–145)
TESTOST SERPL-MCNC: 436 NG/DL (ref 95–948)
TRIGL SERPL-MCNC: 98 MG/DL
WBC # BLD AUTO: 4.92 THOUSAND/UL (ref 4.31–10.16)

## 2020-02-26 PROCEDURE — 80053 COMPREHEN METABOLIC PANEL: CPT

## 2020-02-26 PROCEDURE — 84153 ASSAY OF PSA TOTAL: CPT

## 2020-02-26 PROCEDURE — 80061 LIPID PANEL: CPT

## 2020-02-26 PROCEDURE — 85025 COMPLETE CBC W/AUTO DIFF WBC: CPT

## 2020-02-26 PROCEDURE — 84403 ASSAY OF TOTAL TESTOSTERONE: CPT

## 2020-02-26 PROCEDURE — 36415 COLL VENOUS BLD VENIPUNCTURE: CPT

## 2020-02-27 ENCOUNTER — OFFICE VISIT (OUTPATIENT)
Dept: UROLOGY | Facility: AMBULATORY SURGERY CENTER | Age: 70
End: 2020-02-27
Payer: COMMERCIAL

## 2020-02-27 VITALS
WEIGHT: 195 LBS | HEART RATE: 72 BPM | DIASTOLIC BLOOD PRESSURE: 70 MMHG | BODY MASS INDEX: 28.88 KG/M2 | HEIGHT: 69 IN | SYSTOLIC BLOOD PRESSURE: 130 MMHG

## 2020-02-27 DIAGNOSIS — C61 PROSTATE CANCER (HCC): Primary | ICD-10-CM

## 2020-02-27 PROCEDURE — 1160F RVW MEDS BY RX/DR IN RCRD: CPT | Performed by: UROLOGY

## 2020-02-27 PROCEDURE — 99213 OFFICE O/P EST LOW 20 MIN: CPT | Performed by: UROLOGY

## 2020-02-27 PROCEDURE — 3008F BODY MASS INDEX DOCD: CPT | Performed by: UROLOGY

## 2020-02-27 PROCEDURE — 4040F PNEUMOC VAC/ADMIN/RCVD: CPT | Performed by: UROLOGY

## 2020-02-27 PROCEDURE — 1036F TOBACCO NON-USER: CPT | Performed by: UROLOGY

## 2020-02-27 NOTE — PROGRESS NOTES
2/27/2020    Theodore Villa  1950  [id number]        Assessment  [place] 7 prostate cancer status post robot assisted laparoscopic prostatectomy, status post adjuvant radiation therapy, detectable PSA 0 3 (stable)  Discussion  I reviewed the CT scan of the chest abdomen and pelvis which shows no sign of abnormality or disease recurrence  I would hold on a bone scan as I feel this is premature with a PSA of only 0 3 and the yield would be quite low  I see no indication to administer androgen deprivation therapy at this time  I recommend follow-up in 6 months with his next PSA level  The patient is amenable with this plan  History of Present Illness  78 y o  male with a history of [place] 7 prostate cancer  In 2011 he underwent robot assisted laparoscopic prostatectomy  His PSA became detectable after surgery and then he received adjuvant radiation therapy  His PSA has been stable at 0 3  At his last office visit a digital rectal examination revealed no palpable tissue  He has been seen by medical oncology  A CT scan chest abdomen and pelvis was performed and shows no evidence of disease recurrence  AUA Symptom Score  AUA SYMPTOM SCORE      Most Recent Value   AUA SYMPTOM SCORE   How often have you had a sensation of not emptying your bladder completely after you finished urinating? 1   How often have you had to urinate again less than two hours after you finished urinating? 0   How often have you found you stopped and started again several times when you urinate?  0   How often have you found it difficult to postpone urination? 0   How often have you had a weak urinary stream?  0   How often have you had to push or strain to begin urination? 0   How many times did you most typically get up to urinate from the time you went to bed at night until the time you got up in the morning?   1   Quality of Life: If you were to spend the rest of your life with your urinary condition just the way it is now, how would you feel about that?  0   AUA SYMPTOM SCORE  2          Review of Systems  Review of Systems      Past Medical History  Past Medical History:   Diagnosis Date    Arthritis of hand     Last Assessed: 10/2/2015    Basal cell carcinoma 2014    Last Assessed: 12/6/2017    Chest pain     Last Assessed: 10/14/2014    Fall from ladder     Last Assessed: 1/13/2017    Ganglion of left wrist     Last Assessed: 10/3/2016    Lump of skin     Lsat Assessed: 2/15/2013    Lyme disease     Malignant neoplasm of prostate ([facility] [address] ) 2011    Last Assessed: 4/1/2015    Neck mass     Last Assessed: 10/3/2016    Paresthesia of left thumb     Last Assessed: 2/7/2017    Rupture of radial collateral ligament of left thumb     Last Assessed: 2/7/2017       Past Social History  Past Surgical History:   Procedure Laterality Date    CARPAL TUNNEL RELEASE      PROSTATE SURGERY      ROTATOR CUFF REPAIR Left     TOE SURGERY Right     tip of middle toe removed on right foot       Past Family History  Family History   Problem Relation Age of Onset    Cancer Mother     Uterine cancer Mother     Diabetes Father     Congenital heart disease Sister     Lung cancer Brother        Past Social history  Social History     Socioeconomic History    Marital status: /Civil Union     Spouse name: Not on file    Number of children: Not on file    Years of education: Not on file    Highest education level: Not on file   Occupational History    Not on file   Social Needs    Financial resource strain: Not on file    Food insecurity:     Worry: Not on file     Inability: Not on file    Transportation needs:     Medical: Not on file     Non-medical: Not on file   Tobacco Use    Smoking status: Former Smoker     Packs/day: 1 50    Smokeless tobacco: Never Used    Tobacco comment: quit 1972   Substance and Sexual Activity    Alcohol use: Yes     Frequency: 2-4 times a month     Comment: social    Drug use:  No  Sexual activity: Not on file   Lifestyle    Physical activity:     Days per week: Not on file     Minutes per session: Not on file    Stress: Not on file   Relationships    Social connections:     Talks on phone: Not on file     Gets together: Not on file     Attends [employer] service: Not on file     Active member of club or organization: Not on file     Attends meetings of clubs or organizations: Not on file     Relationship status: Not on file    Intimate partner violence:     Fear of current or ex partner: Not on file     Emotionally abused: Not on file     Physically abused: Not on file     Forced sexual activity: Not on file   Other Topics Concern    Not on file   Social History Narrative    Not on file       Current Medications  Current Outpatient Medications   Medication Sig Dispense Refill    Alpha-Lipoic Acid 100 MG TABS Take by mouth      Ascorbic Acid (VITAMIN C) 1000 MG tablet Take by mouth      Cholecalciferol (VITAMIN D3) 5000 units CAPS Take by mouth      Flax Oil-Fish Oil-Borage Oil (CVS OMEGA-3 PO) Take by mouth      Lycopene 5 MG CAPS Take 1 capsule by mouth daily      Misc Natural Products (GLUCOSAMINE CHONDROITIN ADV PO) Take by mouth      Misc Natural Products (TURMERIC CURCUMIN) CAPS Take by mouth      Multiple Vitamin (MULTIVITAMIN) capsule Take by mouth      Selenium 200 MCG CAPS Take by mouth      vitamin E, tocopherol, 400 units capsule Take by mouth      Zinc 30 MG CAPS Take by mouth       No current facility-administered medications for this visit  Allergies  Allergies   Allergen Reactions    Grass Extracts [Gramineae Pollens]        Past Medical History, Social History, Family History, medications and allergies were reviewed      Vitals  Vitals:    02/27/20 1105   BP: 130/70   BP Location: Left arm   Patient Position: Sitting   Cuff Size: Standard   Pulse: 72   Weight: 88 5 kg (195 lb)   Height: 5' 9" (1 753 m)       Physical Exam  Physical Exam    On examination he is in no acute distress  Gait normal   Affect normal    Results  Lab Results   Component Value Date    PSA 0 3 02/26/2020    PSA 0 3 11/13/2019    PSA <0 1 06/08/2018     Lab Results   Component Value Date    GLUCOSE 80 10/03/2014    CALCIUM 8 9 02/26/2020     10/03/2014    K 4 5 02/26/2020    CO2 28 02/26/2020     (H) 02/26/2020    BUN 21 02/26/2020    CREATININE 1 12 02/26/2020     Lab Results   Component Value Date    WBC 4 92 02/26/2020    HGB 13 6 02/26/2020    HCT 42 1 02/26/2020    MCV 95 02/26/2020     02/26/2020         Office Urine Dip  No results found for this or any previous visit (from the past 1 hour(s))  ]      Total visit time was 15 minutes of which over 50% was spent on counseling

## 2020-03-05 ENCOUNTER — OFFICE VISIT (OUTPATIENT)
Dept: FAMILY MEDICINE CLINIC | Facility: CLINIC | Age: 70
End: 2020-03-05
Payer: COMMERCIAL

## 2020-03-05 VITALS
SYSTOLIC BLOOD PRESSURE: 122 MMHG | HEART RATE: 76 BPM | WEIGHT: 197 LBS | OXYGEN SATURATION: 97 % | BODY MASS INDEX: 29.18 KG/M2 | DIASTOLIC BLOOD PRESSURE: 70 MMHG | TEMPERATURE: 97.1 F | HEIGHT: 69 IN

## 2020-03-05 DIAGNOSIS — R21 RASH: ICD-10-CM

## 2020-03-05 DIAGNOSIS — E78.5 BORDERLINE HYPERLIPIDEMIA: Primary | ICD-10-CM

## 2020-03-05 DIAGNOSIS — C61 CARCINOMA OF PROSTATE (HCC): ICD-10-CM

## 2020-03-05 DIAGNOSIS — Z11.59 ENCOUNTER FOR HEPATITIS C SCREENING TEST FOR LOW RISK PATIENT: ICD-10-CM

## 2020-03-05 PROCEDURE — 4040F PNEUMOC VAC/ADMIN/RCVD: CPT | Performed by: FAMILY MEDICINE

## 2020-03-05 PROCEDURE — 1036F TOBACCO NON-USER: CPT | Performed by: FAMILY MEDICINE

## 2020-03-05 PROCEDURE — 1160F RVW MEDS BY RX/DR IN RCRD: CPT | Performed by: FAMILY MEDICINE

## 2020-03-05 PROCEDURE — 3008F BODY MASS INDEX DOCD: CPT | Performed by: FAMILY MEDICINE

## 2020-03-05 PROCEDURE — 99214 OFFICE O/P EST MOD 30 MIN: CPT | Performed by: FAMILY MEDICINE

## 2020-03-05 NOTE — PROGRESS NOTES
Assessment/Plan:    1  Borderline hyperlipidemia  -     CBC and differential; Future; Expected date: 09/01/2020  -     Comprehensive metabolic panel; Future; Expected date: 09/01/2020  -     Lipid panel; Future; Expected date: 09/01/2020    2  Carcinoma of prostate ([facility] [address] )    3  Rash    4  Encounter for hepatitis C screening test for low risk patient  -     Hepatitis C antibody; Future; Expected date: 09/01/2020        There are no Patient Instructions on file for this visit  Return in about 6 months (around 9/5/2020)  Subjective:      Patient ID: Robert Desai is a 78 y o  male  Chief Complaint   Patient presents with    Follow-up       Here for f/u, labs reviewed  Lipids at goal, BP well controlled  Not exercising, lack of rotuine now that he is retired  Feeling well  No meds  notes rash on his arms that he never noticed before  Also new rash on LLE, had a pale red scaly lesion on back of calf but now a similar lesion on the shin  Has a chronic rash on his chest from [place], [place]  Known to Dr Cristina Ibarra  follows with urology for prostate CA surveillance  Had a full body CT scan which revealed no AAA, among other results  Seeing Dr Schuyler Covarrubias       The following portions of the patient's history were reviewed and updated as appropriate: allergies, current medications, past family history, past medical history, past social history, past surgical history and problem list     Review of Systems   Constitutional: Negative for fatigue and fever  HENT: Negative for congestion  Eyes: Negative for visual disturbance  Respiratory: Negative for chest tightness and shortness of breath  Cardiovascular: Negative for chest pain and palpitations  Gastrointestinal: Negative for abdominal pain  Genitourinary: Negative for difficulty urinating  Musculoskeletal: Negative for arthralgias  Skin: Positive for rash  Neurological: Negative for headaches  Hematological: Does not bruise/bleed easily  Current Outpatient Medications   Medication Sig Dispense Refill    Alpha-Lipoic Acid 100 MG TABS Take by mouth      Ascorbic Acid (VITAMIN C) 1000 MG tablet Take by mouth      Cholecalciferol (VITAMIN D3) 5000 units CAPS Take by mouth      Flax Oil-Fish Oil-Borage Oil (CVS OMEGA-3 PO) Take by mouth      Lycopene 5 MG CAPS Take 1 capsule by mouth daily      Misc Natural Products (GLUCOSAMINE CHONDROITIN ADV PO) Take by mouth      Misc Natural Products (TURMERIC CURCUMIN) CAPS Take by mouth      Multiple Vitamin (MULTIVITAMIN) capsule Take by mouth      Selenium 200 MCG CAPS Take by mouth      vitamin E, tocopherol, 400 units capsule Take by mouth      Zinc 30 MG CAPS Take by mouth       No current facility-administered medications for this visit  Objective:    /70 (BP Location: Right arm, Patient Position: Sitting, Cuff Size: Standard)   Pulse 76   Temp (!) 97 1 °F (36 2 °C)   Ht 5' 9" (1 753 m)   Wt 89 4 kg (197 lb)   SpO2 97%   BMI 29 09 kg/m²        Physical Exam   Constitutional: He is oriented to person, place, and time  He appears well-developed and well-nourished  Cardiovascular: Normal rate, regular rhythm and normal heart sounds  Pulmonary/Chest: Effort normal and breath sounds normal    Neurological: He is alert and oriented to person, place, and time  Skin: Skin is warm  Rash (scaling fungal type rash on chest (chronic), newer lesion more intensely colored on the LLE) noted  Psychiatric: He has a normal mood and affect  His behavior is normal  Judgment and thought content normal    Vitals reviewed               Kishan Martin MD

## 2020-08-21 ENCOUNTER — APPOINTMENT (OUTPATIENT)
Dept: LAB | Facility: CLINIC | Age: 70
End: 2020-08-21
Payer: COMMERCIAL

## 2020-08-21 DIAGNOSIS — Z11.59 ENCOUNTER FOR HEPATITIS C SCREENING TEST FOR LOW RISK PATIENT: ICD-10-CM

## 2020-08-21 DIAGNOSIS — E78.5 BORDERLINE HYPERLIPIDEMIA: ICD-10-CM

## 2020-08-21 DIAGNOSIS — C61 PROSTATE CANCER (HCC): ICD-10-CM

## 2020-08-21 LAB
ALBUMIN SERPL BCP-MCNC: 4.5 G/DL (ref 3.5–5)
ALP SERPL-CCNC: 54 U/L (ref 46–116)
ALT SERPL W P-5'-P-CCNC: 31 U/L (ref 12–78)
ANION GAP SERPL CALCULATED.3IONS-SCNC: 6 MMOL/L (ref 4–13)
AST SERPL W P-5'-P-CCNC: 18 U/L (ref 5–45)
BASOPHILS # BLD AUTO: 0.01 THOUSANDS/ΜL (ref 0–0.1)
BASOPHILS NFR BLD AUTO: 0 % (ref 0–1)
BILIRUB SERPL-MCNC: 0.75 MG/DL (ref 0.2–1)
BUN SERPL-MCNC: 15 MG/DL (ref 5–25)
CALCIUM SERPL-MCNC: 9.3 MG/DL (ref 8.3–10.1)
CHLORIDE SERPL-SCNC: 106 MMOL/L (ref 100–108)
CHOLEST SERPL-MCNC: 258 MG/DL (ref 50–200)
CO2 SERPL-SCNC: 27 MMOL/L (ref 21–32)
CREAT SERPL-MCNC: 0.97 MG/DL (ref 0.6–1.3)
EOSINOPHIL # BLD AUTO: 0.01 THOUSAND/ΜL (ref 0–0.61)
EOSINOPHIL NFR BLD AUTO: 0 % (ref 0–6)
ERYTHROCYTE [DISTWIDTH] IN BLOOD BY AUTOMATED COUNT: 13.5 % (ref 11.6–15.1)
GFR SERPL CREATININE-BSD FRML MDRD: 79 ML/MIN/1.73SQ M
GLUCOSE SERPL-MCNC: 83 MG/DL (ref 65–140)
HCT VFR BLD AUTO: 43.4 % (ref 36.5–49.3)
HCV AB SER QL: NORMAL
HDLC SERPL-MCNC: 51 MG/DL
HGB BLD-MCNC: 14.4 G/DL (ref 12–17)
IMM GRANULOCYTES # BLD AUTO: 0.1 THOUSAND/UL (ref 0–0.2)
IMM GRANULOCYTES NFR BLD AUTO: 2 % (ref 0–2)
LDLC SERPL CALC-MCNC: 181 MG/DL (ref 0–100)
LYMPHOCYTES # BLD AUTO: 1.83 THOUSANDS/ΜL (ref 0.6–4.47)
LYMPHOCYTES NFR BLD AUTO: 42 % (ref 14–44)
MCH RBC QN AUTO: 30.8 PG (ref 26.8–34.3)
MCHC RBC AUTO-ENTMCNC: 33.2 G/DL (ref 31.4–37.4)
MCV RBC AUTO: 93 FL (ref 82–98)
MONOCYTES # BLD AUTO: 0.4 THOUSAND/ΜL (ref 0.17–1.22)
MONOCYTES NFR BLD AUTO: 9 % (ref 4–12)
NEUTROPHILS # BLD AUTO: 1.98 THOUSANDS/ΜL (ref 1.85–7.62)
NEUTS SEG NFR BLD AUTO: 47 % (ref 43–75)
NONHDLC SERPL-MCNC: 207 MG/DL
NRBC BLD AUTO-RTO: 0 /100 WBCS
PLATELET # BLD AUTO: 209 THOUSANDS/UL (ref 149–390)
PMV BLD AUTO: 11 FL (ref 8.9–12.7)
POTASSIUM SERPL-SCNC: 4.1 MMOL/L (ref 3.5–5.3)
PROT SERPL-MCNC: 7.4 G/DL (ref 6.4–8.2)
PSA SERPL-MCNC: 0.4 NG/ML (ref 0–4)
RBC # BLD AUTO: 4.68 MILLION/UL (ref 3.88–5.62)
SODIUM SERPL-SCNC: 139 MMOL/L (ref 136–145)
TRIGL SERPL-MCNC: 129 MG/DL
WBC # BLD AUTO: 4.33 THOUSAND/UL (ref 4.31–10.16)

## 2020-08-21 PROCEDURE — 36415 COLL VENOUS BLD VENIPUNCTURE: CPT

## 2020-08-21 PROCEDURE — 80061 LIPID PANEL: CPT

## 2020-08-21 PROCEDURE — 80053 COMPREHEN METABOLIC PANEL: CPT

## 2020-08-21 PROCEDURE — 85025 COMPLETE CBC W/AUTO DIFF WBC: CPT

## 2020-08-21 PROCEDURE — 86803 HEPATITIS C AB TEST: CPT

## 2020-08-21 PROCEDURE — 84153 ASSAY OF PSA TOTAL: CPT

## 2020-08-27 ENCOUNTER — OFFICE VISIT (OUTPATIENT)
Dept: UROLOGY | Facility: AMBULATORY SURGERY CENTER | Age: 70
End: 2020-08-27
Payer: COMMERCIAL

## 2020-08-27 VITALS
HEIGHT: 69 IN | DIASTOLIC BLOOD PRESSURE: 72 MMHG | SYSTOLIC BLOOD PRESSURE: 122 MMHG | BODY MASS INDEX: 29.18 KG/M2 | WEIGHT: 197 LBS | HEART RATE: 80 BPM | TEMPERATURE: 98.2 F

## 2020-08-27 DIAGNOSIS — C61 PROSTATE CANCER (HCC): Primary | ICD-10-CM

## 2020-08-27 PROCEDURE — 99213 OFFICE O/P EST LOW 20 MIN: CPT | Performed by: UROLOGY

## 2020-08-27 PROCEDURE — 1036F TOBACCO NON-USER: CPT | Performed by: UROLOGY

## 2020-08-27 PROCEDURE — 4040F PNEUMOC VAC/ADMIN/RCVD: CPT | Performed by: UROLOGY

## 2020-08-27 PROCEDURE — 3008F BODY MASS INDEX DOCD: CPT | Performed by: UROLOGY

## 2020-08-27 PROCEDURE — 1160F RVW MEDS BY RX/DR IN RCRD: CPT | Performed by: UROLOGY

## 2020-08-27 NOTE — PROGRESS NOTES
8/27/2020    [facility]  1950  [id number]        Assessment  [place] 7 prostate cancer status post robot assisted laparoscopic prostatectomy, status post adjuvant radiation therapy, PSA 0 4      Discussion  We discussed his PSA of 0 4 which is relatively stable over the course of the last year when his PSA was 0 3  I recommend that he return in 6 months time with his next PSA level  I would hold on androgen deprivation therapy at this time  I do not feel that an Axumen CT PET scan is sensitive enough with his PSA of only 0 4 and no further imaging is recommended  Is amenable with this plan        History of Present Illness  69 y o  male with a history of [place] 7 prostate cancer  In 2011 he underwent a robot assisted laparoscopic prostatectomy  His PSA became detectable after surgery  He then received adjuvant radiation therapy  His PSA had been stable at 0 3  He returns in follow-up today  His most recent PSA is 0 4  He has not received Lupron  He was evaluated by medical oncology        AUA Symptom Score  AUA SYMPTOM SCORE      Most Recent Value   AUA SYMPTOM SCORE   How often have you had a sensation of not emptying your bladder completely after you finished urinating? 1   How often have you had to urinate again less than two hours after you finished urinating? 0   How often have you found you stopped and started again several times when you urinate?  0   How often have you found it difficult to postpone urination? 1   How often have you had a weak urinary stream?  0   How often have you had to push or strain to begin urination?   0   How many times did you most typically get up to urinate from the time you went to bed at night until the time you got up in the morning?  0   Quality of Life: If you were to spend the rest of your life with your urinary condition just the way it is now, how would you feel about that?  0   AUA SYMPTOM SCORE  2          Review of Systems  Review of Systems Constitutional: Negative  HENT: Negative  Eyes: Negative  Respiratory: Negative  Cardiovascular: Negative  Gastrointestinal: Negative  Endocrine: Negative  Genitourinary:        Per HPI   Musculoskeletal: Negative  Skin: Negative  Allergic/Immunologic: Negative  Neurological: Negative  Hematological: Negative  Psychiatric/Behavioral: Negative            Past Medical History  Past Medical History:   Diagnosis Date    Arthritis of hand     Last Assessed: 10/2/2015    Basal cell carcinoma 2014    Last Assessed: 12/6/2017    Chest pain     Last Assessed: 10/14/2014    Fall from ladder     Last Assessed: 1/13/2017    Ganglion of left wrist     Last Assessed: 10/3/2016    Lump of skin     Lsat Assessed: 2/15/2013    Lyme disease     Malignant neoplasm of prostate ([facility] [address] ) 2011    Last Assessed: 4/1/2015    Neck mass     Last Assessed: 10/3/2016    Paresthesia of left thumb     Last Assessed: 2/7/2017    Rupture of radial collateral ligament of left thumb     Last Assessed: 2/7/2017       Past Social History  Past Surgical History:   Procedure Laterality Date    CARPAL TUNNEL RELEASE      PROSTATE SURGERY      ROTATOR CUFF REPAIR Left     TOE SURGERY Right     tip of middle toe removed on right foot       Past Family History  Family History   Problem Relation Age of Onset    Cancer Mother     Uterine cancer Mother     Diabetes Father     Congenital heart disease Sister     Lung cancer Brother        Past Social history  Social History     Socioeconomic History    Marital status: /Civil Union     Spouse name: Not on file    Number of children: Not on file    Years of education: Not on file    Highest education level: Not on file   Occupational History    Not on file   Social Needs    Financial resource strain: Not on file    Food insecurity     Worry: Not on file     Inability: Not on file    Transportation needs     Medical: Not on file     Non-medical: Not on file   Tobacco Use    Smoking status: Former Smoker     Packs/day: 1 50    Smokeless tobacco: Never Used    Tobacco comment: quit 1972   Substance and Sexual Activity    Alcohol use: Yes     Frequency: 2-4 times a month     Comment: social    Drug use: No    Sexual activity: Not on file   Lifestyle    Physical activity     Days per week: Not on file     Minutes per session: Not on file    Stress: Not on file   Relationships    Social connections     Talks on phone: Not on file     Gets together: Not on file     Attends Hinduism service: Not on file     Active member of club or organization: Not on file     Attends meetings of clubs or organizations: Not on file     Relationship status: Not on file    Intimate partner violence     Fear of current or ex partner: Not on file     Emotionally abused: Not on file     Physically abused: Not on file     Forced sexual activity: Not on file   Other Topics Concern    Not on file   Social History Narrative    Not on file       Current Medications  Current Outpatient Medications   Medication Sig Dispense Refill    Alpha-Lipoic Acid 100 MG TABS Take by mouth      Ascorbic Acid (VITAMIN C) 1000 MG tablet Take by mouth      Cholecalciferol (VITAMIN D3) 5000 units CAPS Take by mouth      Flax Oil-Fish Oil-Borage Oil ([employer] OMEGA-3 PO) Take by mouth      Lycopene 5 MG CAPS Take 1 capsule by mouth daily      Misc Natural Products (GLUCOSAMINE CHONDROITIN ADV PO) Take by mouth      Misc Natural Products (TURMERIC CURCUMIN) CAPS Take by mouth      Multiple Vitamin (MULTIVITAMIN) capsule Take by mouth      Selenium 200 MCG CAPS Take by mouth      vitamin E, tocopherol, 400 units capsule Take by mouth      Zinc 30 MG CAPS Take by mouth       No current facility-administered medications for this visit          Allergies  Allergies   Allergen Reactions    Grass Extracts [Gramineae Pollens]        Past Medical History, Social History, Family History, medications and allergies were reviewed  Vitals  Vitals:    08/27/20 1016   BP: 122/72   Pulse: 80   Temp: 98 2 °F (36 8 °C)   Weight: 89 4 kg (197 lb)   Height: 5' 9" (1 753 m)       Physical Exam  Physical Exam    On examination he is in no acute distress  Gait normal   Affect normal   Patient defers digital rectal examination which was last performed in November 2019 without evidence of palpable disease  Results  Lab Results   Component Value Date    PSA 0 4 08/21/2020    PSA 0 3 02/26/2020    PSA 0 3 11/13/2019     Lab Results   Component Value Date    GLUCOSE 80 10/03/2014    CALCIUM 9 3 08/21/2020     10/03/2014    K 4 1 08/21/2020    CO2 27 08/21/2020     08/21/2020    BUN 15 08/21/2020    CREATININE 0 97 08/21/2020     Lab Results   Component Value Date    WBC 4 33 08/21/2020    HGB 14 4 08/21/2020    HCT 43 4 08/21/2020    MCV 93 08/21/2020     08/21/2020         Office Urine Dip  No results found for this or any previous visit (from the past 1 hour(s))  ]      Total visit time was 15 minutes of which over 50% was spent on counseling

## 2020-08-27 NOTE — LETTER
August 27, 2020     Leonila Elliott MD  [address] [place] [address] Close    Patient: Rob Drew   YOB: 1950   Date of Visit: 8/27/2020       Dear Dr Dorothy Aguirre: Thank you for referring Clay Skeder to me for evaluation  Below are my notes for this consultation  If you have questions, please do not hesitate to call me  I look forward to following your patient along with you  Sincerely,        Donte Joseph MD        CC: David Menjivar MD PhD  Donte Joseph MD  8/27/2020 10:50 AM  Sign when Signing Visit  8/27/2020    Allie Villa  1950  [id number]        Assessment  Nhi 7 prostate cancer status post robot assisted laparoscopic prostatectomy, status post adjuvant radiation therapy, PSA 0 4      Discussion  We discussed his PSA of 0 4 which is relatively stable over the course of the last year when his PSA was 0 3  I recommend that he return in 6 months time with his next PSA level  I would hold on androgen deprivation therapy at this time  I do not feel that an Axumen CT PET scan is sensitive enough with his PSA of only 0 4 and no further imaging is recommended  Is amenable with this plan        History of Present Illness  69 y o  male with a history of [place] 7 prostate cancer  In 2011 he underwent a robot assisted laparoscopic prostatectomy  His PSA became detectable after surgery  He then received adjuvant radiation therapy  His PSA had been stable at 0 3  He returns in follow-up today  His most recent PSA is 0 4  He has not received Lupron  He was evaluated by medical oncology        AUA Symptom Score  AUA SYMPTOM SCORE      Most Recent Value   AUA SYMPTOM SCORE   How often have you had a sensation of not emptying your bladder completely after you finished urinating? 1   How often have you had to urinate again less than two hours after you finished urinating?   0   How often have you found you stopped and started again several times when you urinate?  0   How often have you found it difficult to postpone urination? 1   How often have you had a weak urinary stream?  0   How often have you had to push or strain to begin urination? 0   How many times did you most typically get up to urinate from the time you went to bed at night until the time you got up in the morning?  0   Quality of Life: If you were to spend the rest of your life with your urinary condition just the way it is now, how would you feel about that?  0   AUA SYMPTOM SCORE  2          Review of Systems  Review of Systems   Constitutional: Negative  HENT: Negative  Eyes: Negative  Respiratory: Negative  Cardiovascular: Negative  Gastrointestinal: Negative  Endocrine: Negative  Genitourinary:        Per HPI   Musculoskeletal: Negative  Skin: Negative  Allergic/Immunologic: Negative  Neurological: Negative  Hematological: Negative  Psychiatric/Behavioral: Negative            Past Medical History  Past Medical History:   Diagnosis Date    Arthritis of hand     Last Assessed: 10/2/2015    Basal cell carcinoma 2014    Last Assessed: 12/6/2017    Chest pain     Last Assessed: 10/14/2014    Fall from ladder     Last Assessed: 1/13/2017    Ganglion of left wrist     Last Assessed: 10/3/2016    Lump of skin     Lsat Assessed: 2/15/2013    Lyme disease     Malignant neoplasm of prostate ([facility] [address] ) 2011    Last Assessed: 4/1/2015    Neck mass     Last Assessed: 10/3/2016    Paresthesia of left thumb     Last Assessed: 2/7/2017    Rupture of radial collateral ligament of left thumb     Last Assessed: 2/7/2017       Past Social History  Past Surgical History:   Procedure Laterality Date    CARPAL TUNNEL RELEASE      PROSTATE SURGERY      ROTATOR CUFF REPAIR Left     TOE SURGERY Right     tip of middle toe removed on right foot       Past Family History  Family History   Problem Relation Age of Onset    Cancer Mother     Uterine cancer Mother  Diabetes Father     Congenital heart disease Sister     Lung cancer Brother        Past Social history  Social History     Socioeconomic History    Marital status: /Civil Union     Spouse name: Not on file    Number of children: Not on file    Years of education: Not on file    Highest education level: Not on file   Occupational History    Not on file   Social Needs    Financial resource strain: Not on file    Food insecurity     Worry: Not on file     Inability: Not on file   [employer] needs     Medical: Not on file     Non-medical: Not on file   Tobacco Use    Smoking status: Former Smoker     Packs/day: 1 50    Smokeless tobacco: Never Used    Tobacco comment: quit 1972   Substance and Sexual Activity    Alcohol use: Yes     Frequency: 2-4 times a month     Comment: social    Drug use: No    Sexual activity: Not on file   Lifestyle    Physical activity     Days per week: Not on file     Minutes per session: Not on file    Stress: Not on file   Relationships    Social connections     Talks on phone: Not on file     Gets together: Not on file     Attends Sabianism service: Not on file     Active member of club or organization: Not on file     Attends meetings of clubs or organizations: Not on file     Relationship status: Not on file    Intimate partner violence     Fear of current or ex partner: Not on file     Emotionally abused: Not on file     Physically abused: Not on file     Forced sexual activity: Not on file   Other Topics Concern    Not on file   Social History Narrative    Not on file       Current Medications  Current Outpatient Medications   Medication Sig Dispense Refill    Alpha-Lipoic Acid 100 MG TABS Take by mouth      Ascorbic Acid (VITAMIN C) 1000 MG tablet Take by mouth      Cholecalciferol (VITAMIN D3) 5000 units CAPS Take by mouth      Flax Oil-Fish Oil-Borage Oil (CVS OMEGA-3 PO) Take by mouth      Lycopene 5 MG CAPS Take 1 capsule by mouth daily  Misc Natural Products (GLUCOSAMINE CHONDROITIN ADV PO) Take by mouth      Misc Natural Products (TURMERIC CURCUMIN) CAPS Take by mouth      Multiple Vitamin (MULTIVITAMIN) capsule Take by mouth      Selenium 200 MCG CAPS Take by mouth      vitamin E, tocopherol, 400 units capsule Take by mouth      Zinc 30 MG CAPS Take by mouth       No current facility-administered medications for this visit  Allergies  Allergies   Allergen Reactions    Grass Extracts [Gramineae Pollens]        Past Medical History, Social History, Family History, medications and allergies were reviewed  Vitals  Vitals:    08/27/20 1016   BP: 122/72   Pulse: 80   Temp: 98 2 °F (36 8 °C)   Weight: 89 4 kg (197 lb)   Height: 5' 9" (1 753 m)       Physical Exam  Physical Exam    On examination he is in no acute distress  Gait normal   Affect normal   Patient defers digital rectal examination which was last performed in November 2019 without evidence of palpable disease  Results  Lab Results   Component Value Date    PSA 0 4 08/21/2020    PSA 0 3 02/26/2020    PSA 0 3 11/13/2019     Lab Results   Component Value Date    GLUCOSE 80 10/03/2014    CALCIUM 9 3 08/21/2020     10/03/2014    K 4 1 08/21/2020    CO2 27 08/21/2020     08/21/2020    BUN 15 08/21/2020    CREATININE 0 97 08/21/2020     Lab Results   Component Value Date    WBC 4 33 08/21/2020    HGB 14 4 08/21/2020    HCT 43 4 08/21/2020    MCV 93 08/21/2020     08/21/2020         Office Urine Dip  No results found for this or any previous visit (from the past 1 hour(s))  ]      Total visit time was 15 minutes of which over 50% was spent on counseling

## 2020-09-17 ENCOUNTER — OFFICE VISIT (OUTPATIENT)
Dept: FAMILY MEDICINE CLINIC | Facility: CLINIC | Age: 70
End: 2020-09-17
Payer: COMMERCIAL

## 2020-09-17 VITALS
DIASTOLIC BLOOD PRESSURE: 78 MMHG | SYSTOLIC BLOOD PRESSURE: 126 MMHG | HEIGHT: 69 IN | WEIGHT: 193 LBS | OXYGEN SATURATION: 98 % | HEART RATE: 78 BPM | TEMPERATURE: 97.6 F | BODY MASS INDEX: 28.58 KG/M2

## 2020-09-17 DIAGNOSIS — G56.02 LEFT CARPAL TUNNEL SYNDROME: ICD-10-CM

## 2020-09-17 DIAGNOSIS — Z00.00 MEDICARE ANNUAL WELLNESS VISIT, SUBSEQUENT: ICD-10-CM

## 2020-09-17 DIAGNOSIS — M25.462 SWELLING OF JOINT OF LEFT KNEE: ICD-10-CM

## 2020-09-17 DIAGNOSIS — E78.5 BORDERLINE HYPERLIPIDEMIA: Primary | ICD-10-CM

## 2020-09-17 DIAGNOSIS — C61 CARCINOMA OF PROSTATE (HCC): ICD-10-CM

## 2020-09-17 PROCEDURE — G0439 PPPS, SUBSEQ VISIT: HCPCS | Performed by: FAMILY MEDICINE

## 2020-09-17 PROCEDURE — 1036F TOBACCO NON-USER: CPT | Performed by: FAMILY MEDICINE

## 2020-09-17 PROCEDURE — 1160F RVW MEDS BY RX/DR IN RCRD: CPT | Performed by: FAMILY MEDICINE

## 2020-09-17 PROCEDURE — 3725F SCREEN DEPRESSION PERFORMED: CPT | Performed by: FAMILY MEDICINE

## 2020-09-17 PROCEDURE — 99214 OFFICE O/P EST MOD 30 MIN: CPT | Performed by: FAMILY MEDICINE

## 2020-09-17 PROCEDURE — 1125F AMNT PAIN NOTED PAIN PRSNT: CPT | Performed by: FAMILY MEDICINE

## 2020-09-17 PROCEDURE — 1170F FXNL STATUS ASSESSED: CPT | Performed by: FAMILY MEDICINE

## 2020-09-17 RX ORDER — ROSUVASTATIN CALCIUM 5 MG/1
5 TABLET, COATED ORAL DAILY
Qty: 90 TABLET | Refills: 0 | Status: SHIPPED | OUTPATIENT
Start: 2020-09-17 | End: 2020-09-17 | Stop reason: SDUPTHER

## 2020-09-17 RX ORDER — ROSUVASTATIN CALCIUM 5 MG/1
5 TABLET, COATED ORAL DAILY
Qty: 90 TABLET | Refills: 0 | Status: SHIPPED | OUTPATIENT
Start: 2020-09-17 | End: 2020-12-08 | Stop reason: SDUPTHER

## 2020-09-17 NOTE — PROGRESS NOTES
Assessment/Plan:    1  Borderline hyperlipidemia  Comments:  trial of crestor, recheck labs at next visit  Orders:  -     rosuvastatin (CRESTOR) 5 mg tablet; Take 1 tablet (5 mg total) by mouth daily  -     CBC and differential; Future; Expected date: 01/25/2021  -     Comprehensive metabolic panel; Future; Expected date: 01/25/2021  -     Lipid panel; Future; Expected date: 01/25/2021    2  Medicare annual wellness visit, subsequent    3  Carcinoma of prostate [facility])  Comments:  watching PSA with urology    4  Left carpal tunnel syndrome  Comments:  from L wrist and forearm tendonitis, exercises given, wrist brace for compression and support    5  Swelling of joint of left knee  Comments:  consider baker's cyst, consider knee xray or US, pt declines for now, compression brace as needed        There are no Patient Instructions on file for this visit  Return in about 6 months (around 3/17/2021)  Subjective:      Patient ID: Dorie Daugherty is a 69 y o  male  Chief Complaint   Patient presents with    Follow-up    Medicare Wellness Visit       C/o L hand numbness and swelling, has been doing a lot of work around the house with power tools, last 6-8 months  Had a fall 2 years ago and injured his thumb  Numbness does not extend beyond the wrist  No pain  R handed  Also c/o fullness behind the L knee, painful to bend fully, or extend fully  Declines xray today, but has not used a brace for ewither the ewtrist or the knee to this point      The following portions of the patient's history were reviewed and updated as appropriate: allergies, current medications, past family history, past medical history, past social history, past surgical history and problem list     Review of Systems   Constitutional: Negative for fatigue and fever  HENT: Negative for congestion  Eyes: Negative for visual disturbance  Respiratory: Negative for chest tightness and shortness of breath      Cardiovascular: Negative for chest pain and palpitations  Gastrointestinal: Negative for abdominal pain  Genitourinary: Negative for difficulty urinating  Musculoskeletal: Negative for arthralgias  Neurological: Negative for headaches  Hematological: Does not bruise/bleed easily  Current Outpatient Medications   Medication Sig Dispense Refill    Alpha-Lipoic Acid 100 MG TABS Take by mouth      Ascorbic Acid (VITAMIN C) 1000 MG tablet Take by mouth      Cholecalciferol (VITAMIN D3) 5000 units CAPS Take by mouth      Flax Oil-Fish Oil-Borage Oil (CVS OMEGA-3 PO) Take by mouth      Lycopene 5 MG CAPS Take 1 capsule by mouth daily      Misc Natural Products (GLUCOSAMINE CHONDROITIN ADV PO) Take by mouth      Misc Natural Products (TURMERIC CURCUMIN) CAPS Take by mouth      Multiple Vitamin (MULTIVITAMIN) capsule Take by mouth      Selenium 200 MCG CAPS Take by mouth      vitamin E, tocopherol, 400 units capsule Take by mouth      Zinc 30 MG CAPS Take by mouth      rosuvastatin (CRESTOR) 5 mg tablet Take 1 tablet (5 mg total) by mouth daily 90 tablet 0     No current facility-administered medications for this visit  Objective:    /78 (BP Location: Right arm, Patient Position: Sitting, Cuff Size: Large)   Pulse 78   Temp 97 6 °F (36 4 °C)   Ht 5' 9" (1 753 m)   Wt 87 5 kg (193 lb)   SpO2 98%   BMI 28 50 kg/m²        Physical Exam  Vitals signs reviewed  Constitutional:       Appearance: He is well-developed  Cardiovascular:      Rate and Rhythm: Normal rate and regular rhythm  Heart sounds: Normal heart sounds  Pulmonary:      Effort: Pulmonary effort is normal       Breath sounds: Normal breath sounds  Musculoskeletal:         General: Swelling (in L fingers, pos phlanes, neg tinels, has to shake out the hand, good strength throught UE and neck) and tenderness (only with flexion, not with weight bearing) present  No signs of injury  Right lower leg: No edema        Left lower leg: Edema (fullness behind L knee, baker's cyst?) present  Comments: Some crepitus with L knee ROM, good stability   Skin:     General: Skin is warm  Neurological:      Mental Status: He is alert and oriented to person, place, and time  Psychiatric:         Behavior: Behavior normal          Thought Content: Thought content normal          Judgment: Judgment normal                 Mae Collazo MD  Answers for HPI/ROS submitted by the patient on 9/15/2020   How would you rate your overall health?: excellent  Compared to last year, how is your physical health?: same  Compared to last year, how is your eyesight?: same  Compared to last year, how is your hearing?: same  Compared to last year, how is your emotional/mental health?: same  In the past 7 days, how much pain have you experienced?: some  If you answered "some" or "a lot", please rate the severity of your pain on a scale of 1 to 10 (1 being the least severe pain and 10 being the most intense pain)  : 3/10  In the past 6 months, have you lost or gained 10 pounds without trying?: No  Additional Comments: Problem with left knee, left hand numbness  One or more falls in the last year: No  In the past 6 months, have you accidentally leaked urine?: No  Do you have trouble with the stairs inside or outside your home?: No  Does your home have working smoke alarms?: Yes  Does your home have a carbon monoxide monitor?: Yes  Which safety hazards (if any) have you experienced in your home? Please select all that apply : none  How would you describe your current diet?  Please select all that apply : Regular  In addition to prescription medications, are you taking any over-the-counter supplements?: Yes  If yes, what supplements are you taking?: Multi vitamin, vit c, vit e, alpha lipoid acid, termeric cumin, others  Can you manage your medications?: Yes  Can you walk and transfer into and out of your bed and chair?: Yes  Can you dress and groom yourself?: Yes  Can you bathe or shower yourself?: Yes  Can you feed yourself?: Yes  Can you do your laundry/ housekeeping?: Yes  Can you manage your money, pay your bills, and track your expenses?: Yes  Can you make your own meals?: Yes  Can you do your own shopping?: Yes  Please list your DME (Durable Medical Equipment) supplier, if you use one : None  Within the last 12 months, have you had any hospitalizations or Emergency Department visits?: No  Do you have a living will?: No  Do you have a Durable POA (Power of ) for healthcare decisions?: No  Do you have an Advanced Directive for end of life decisions?: No

## 2020-09-17 NOTE — PROGRESS NOTES
Assessment and Plan:     Problem List Items Addressed This Visit        Other    Borderline hyperlipidemia - Primary    Relevant Medications    rosuvastatin (CRESTOR) 5 mg tablet      Other Visit Diagnoses     Medicare annual wellness visit, subsequent            BMI Counseling: Body mass index is 28 5 kg/m²  The BMI is above normal  Nutrition recommendations include decreasing portion sizes, decreasing fast food intake, consuming healthier snacks, limiting drinks that contain sugar and moderation in carbohydrate intake  Exercise recommendations include moderate physical activity 150 minutes/week, exercising 3-5 times per week and strength training exercises  No pharmacotherapy was ordered  Patient referred to PCP due to patient being overweight  Preventive health issues were discussed with patient, and age appropriate screening tests were ordered as noted in patient's After Visit Summary  Personalized health advice and appropriate referrals for health education or preventive services given if needed, as noted in patient's After Visit Summary       History of Present Illness:     Patient presents for Medicare Annual Wellness visit    Patient Care Team:  Tonya Suero MD as PCP - General  ANASTACIA Lay MD Rosann Demark, MD Daron Crimes, MD     Problem List:     Patient Active Problem List   Diagnosis    Borderline hyperlipidemia    Carcinoma of prostate [facility])    Soft tissue swelling of chest wall    Left shoulder pain      Past Medical and Surgical History:     Past Medical History:   Diagnosis Date    Arthritis of hand     Last Assessed: 10/2/2015    Basal cell carcinoma 2014    Last Assessed: 12/6/2017    Chest pain     Last Assessed: 10/14/2014    Fall from ladder     Last Assessed: 1/13/2017    Ganglion of left wrist     Last Assessed: 10/3/2016    Lump of skin     Lsat Assessed: 2/15/2013    Lyme disease     Malignant neoplasm of prostate ([facility] [address] ) 2011 Last Assessed: 4/1/2015    Neck mass     Last Assessed: 10/3/2016    Paresthesia of left thumb     Last Assessed: 2/7/2017    Rupture of radial collateral ligament of left thumb     Last Assessed: 2/7/2017     Past Surgical History:   Procedure Laterality Date    CARPAL TUNNEL RELEASE      PROSTATE SURGERY      ROTATOR CUFF REPAIR Left     TOE SURGERY Right     tip of middle toe removed on right foot      Family History:     Family History   Problem Relation Age of Onset    Cancer Mother     Uterine cancer Mother     Diabetes Father     Congenital heart disease Sister     Lung cancer Brother       Social History:        Social History     Socioeconomic History    Marital status: /Civil Union     Spouse name: Not on file    Number of children: Not on file    Years of education: Not on file    Highest education level: Not on file   Occupational History    Not on file   Social Needs    Financial resource strain: Not on file    Food insecurity     Worry: Not on file     Inability: Not on file    Transportation needs     Medical: Not on file     Non-medical: Not on file   Tobacco Use    Smoking status: Former Smoker     Packs/day: 1 50    Smokeless tobacco: Never Used    Tobacco comment: quit 1972   Substance and Sexual Activity    Alcohol use: Yes     Frequency: 2-4 times a month     Comment: social    Drug use: No    Sexual activity: Not on file   Lifestyle    Physical activity     Days per week: Not on file     Minutes per session: Not on file    Stress: Not on file   Relationships    Social connections     Talks on phone: Not on file     Gets together: Not on file     Attends Muslim service: Not on file     Active member of club or organization: Not on file     Attends meetings of clubs or organizations: Not on file     Relationship status: Not on file    Intimate partner violence     Fear of current or ex partner: Not on file     Emotionally abused: Not on file     Physically abused: Not on file     Forced sexual activity: Not on file   Other Topics Concern    Not on file   Social History Narrative    Not on file      Medications and Allergies:     Current Outpatient Medications   Medication Sig Dispense Refill    Alpha-Lipoic Acid 100 MG TABS Take by mouth      Ascorbic Acid (VITAMIN C) 1000 MG tablet Take by mouth      Cholecalciferol (VITAMIN D3) 5000 units CAPS Take by mouth      Flax Oil-Fish Oil-Borage Oil ([employer] OMEGA-3 PO) Take by mouth      Lycopene 5 MG CAPS Take 1 capsule by mouth daily      Misc Natural Products (GLUCOSAMINE CHONDROITIN ADV PO) Take by mouth      Misc Natural Products (TURMERIC CURCUMIN) CAPS Take by mouth      Multiple Vitamin (MULTIVITAMIN) capsule Take by mouth      Selenium 200 MCG CAPS Take by mouth      vitamin E, tocopherol, 400 units capsule Take by mouth      Zinc 30 MG CAPS Take by mouth      rosuvastatin (CRESTOR) 5 mg tablet Take 1 tablet (5 mg total) by mouth daily 90 tablet 0     No current facility-administered medications for this visit  Allergies   Allergen Reactions    Grass Extracts [Gramineae Pollens]       Immunizations:     Immunization History   Administered Date(s) Administered    Pneumococcal Polysaccharide PPV23 08/18/2015      Health Maintenance:         Topic Date Due    Hepatitis C Screening  Completed         Topic Date Due    DTaP,Tdap,and Td Vaccines (1 - Tdap) 08/25/1970    Influenza Vaccine  07/01/2020      Medicare Health Risk Assessment:     /78 (BP Location: Right arm, Patient Position: Sitting, Cuff Size: Large)   Pulse 78   Temp 97 6 °F (36 4 °C)   Ht 5' 9" (1 753 m)   Wt 87 5 kg (193 lb)   SpO2 98%   BMI 28 50 kg/m²      Huong Ervin is here for his Subsequent Wellness visit  Last Medicare Wellness visit information reviewed, patient interviewed and updates made to the record today  Health Risk Assessment:   Patient rates overall health as excellent   Patient feels that their physical health rating is same  Eyesight was rated as same  Hearing was rated as same  Patient feels that their emotional and mental health rating is same  Pain experienced in the last 7 days has been some  Patient's pain rating has been 3/10  Patient states that he has experienced no weight loss or gain in last 6 months  Problem with left knee, left hand numbness  Depression Screening:   PHQ-2 Score: 0      Fall Risk Screening: In the past year, patient has experienced: no history of falling in past year      Home Safety:  Patient does not have trouble with stairs inside or outside of their home  Patient has working smoke alarms and has working carbon monoxide detector  Home safety hazards include: none  Nutrition:   Current diet is Regular  Medications:   Patient is currently taking over-the-counter supplements  OTC medications include: Multi vitamin, vit c, vit e, alpha lipoid acid, termeric cumin, others  Patient is able to manage medications  Activities of Daily Living (ADLs)/Instrumental Activities of Daily Living (IADLs):   Walk and transfer into and out of bed and chair?: Yes  Dress and groom yourself?: Yes    Bathe or shower yourself?: Yes    Feed yourself?  Yes  Do your laundry/housekeeping?: Yes  Manage your money, pay your bills and track your expenses?: Yes  Make your own meals?: Yes    Do your own shopping?: Yes    Durable Medical Equipment Suppliers  None    Previous Hospitalizations:   Any hospitalizations or ED visits within the last 12 months?: No      Advance Care Planning:   Living will: No    Durable POA for healthcare: No    Advanced directive: No      Cognitive Screening:   Provider or family/friend/caregiver concerned regarding cognition?: No    PREVENTIVE SCREENINGS      Cardiovascular Screening:    General: History Lipid Disorder and Screening Current      Diabetes Screening:     General: Screening Current      Colorectal Cancer Screening:     General: Screening Current      Prostate Cancer Screening:    General: History Prostate Cancer and Screening Current      Osteoporosis Screening:    General: Screening Not Indicated      Abdominal Aortic Aneurysm (AAA) Screening:    Risk factors include: age between 73-67 yo and tobacco use        General: Risks and Benefits Discussed    Due for: Screening AAA Ultrasound      Lung Cancer Screening:     General: Screening Not Indicated      Hepatitis C Screening:    General: Screening Current      Jorge Piña MD

## 2020-12-08 DIAGNOSIS — E78.5 BORDERLINE HYPERLIPIDEMIA: ICD-10-CM

## 2020-12-09 RX ORDER — ROSUVASTATIN CALCIUM 5 MG/1
5 TABLET, COATED ORAL DAILY
Qty: 90 TABLET | Refills: 1 | Status: SHIPPED | OUTPATIENT
Start: 2020-12-09 | End: 2020-12-20

## 2020-12-18 DIAGNOSIS — E78.5 BORDERLINE HYPERLIPIDEMIA: ICD-10-CM

## 2020-12-20 RX ORDER — ROSUVASTATIN CALCIUM 5 MG/1
TABLET, COATED ORAL
Qty: 90 TABLET | Refills: 1 | Status: SHIPPED | OUTPATIENT
Start: 2020-12-20 | End: 2021-08-10 | Stop reason: SDUPTHER

## 2021-02-26 ENCOUNTER — TELEPHONE (OUTPATIENT)
Dept: UROLOGY | Facility: AMBULATORY SURGERY CENTER | Age: 71
End: 2021-02-26

## 2021-02-26 DIAGNOSIS — N52.9 ERECTILE DYSFUNCTION, UNSPECIFIED ERECTILE DYSFUNCTION TYPE: ICD-10-CM

## 2021-02-26 DIAGNOSIS — C61 PROSTATE CANCER (HCC): Primary | ICD-10-CM

## 2021-02-26 NOTE — TELEPHONE ENCOUNTER
Called and spoke with patient, advising him that appointment for 3/11 with Dr Davida Villa has been cancelled  Told him to get his PSA test done and then we will call him once we get the results and advise on follow up appointment- Patient said he will be going for his PSA the week of March 8

## 2021-03-05 DIAGNOSIS — Z23 ENCOUNTER FOR IMMUNIZATION: ICD-10-CM

## 2021-03-08 ENCOUNTER — APPOINTMENT (OUTPATIENT)
Dept: LAB | Facility: CLINIC | Age: 71
End: 2021-03-08
Payer: COMMERCIAL

## 2021-03-08 DIAGNOSIS — C61 PROSTATE CANCER (HCC): ICD-10-CM

## 2021-03-08 DIAGNOSIS — C61 CARCINOMA OF PROSTATE (HCC): ICD-10-CM

## 2021-03-08 DIAGNOSIS — E78.5 BORDERLINE HYPERLIPIDEMIA: ICD-10-CM

## 2021-03-08 DIAGNOSIS — C61 CARCINOMA OF PROSTATE (HCC): Primary | ICD-10-CM

## 2021-03-08 LAB
ALBUMIN SERPL BCP-MCNC: 4.3 G/DL (ref 3.5–5)
ALP SERPL-CCNC: 59 U/L (ref 46–116)
ALT SERPL W P-5'-P-CCNC: 39 U/L (ref 12–78)
ANION GAP SERPL CALCULATED.3IONS-SCNC: 6 MMOL/L (ref 4–13)
AST SERPL W P-5'-P-CCNC: 19 U/L (ref 5–45)
BASOPHILS # BLD AUTO: 0.01 THOUSANDS/ΜL (ref 0–0.1)
BASOPHILS NFR BLD AUTO: 0 % (ref 0–1)
BILIRUB SERPL-MCNC: 0.9 MG/DL (ref 0.2–1)
BUN SERPL-MCNC: 15 MG/DL (ref 5–25)
CALCIUM SERPL-MCNC: 9.4 MG/DL (ref 8.3–10.1)
CHLORIDE SERPL-SCNC: 109 MMOL/L (ref 100–108)
CHOLEST SERPL-MCNC: 174 MG/DL (ref 50–200)
CO2 SERPL-SCNC: 27 MMOL/L (ref 21–32)
CREAT SERPL-MCNC: 1.08 MG/DL (ref 0.6–1.3)
EOSINOPHIL # BLD AUTO: 0.01 THOUSAND/ΜL (ref 0–0.61)
EOSINOPHIL NFR BLD AUTO: 0 % (ref 0–6)
ERYTHROCYTE [DISTWIDTH] IN BLOOD BY AUTOMATED COUNT: 13.3 % (ref 11.6–15.1)
GFR SERPL CREATININE-BSD FRML MDRD: 69 ML/MIN/1.73SQ M
GLUCOSE P FAST SERPL-MCNC: 85 MG/DL (ref 65–99)
HCT VFR BLD AUTO: 42.9 % (ref 36.5–49.3)
HDLC SERPL-MCNC: 53 MG/DL
HGB BLD-MCNC: 14.1 G/DL (ref 12–17)
IMM GRANULOCYTES # BLD AUTO: 0.05 THOUSAND/UL (ref 0–0.2)
IMM GRANULOCYTES NFR BLD AUTO: 1 % (ref 0–2)
LDLC SERPL CALC-MCNC: 99 MG/DL (ref 0–100)
LYMPHOCYTES # BLD AUTO: 1.58 THOUSANDS/ΜL (ref 0.6–4.47)
LYMPHOCYTES NFR BLD AUTO: 34 % (ref 14–44)
MCH RBC QN AUTO: 30.3 PG (ref 26.8–34.3)
MCHC RBC AUTO-ENTMCNC: 32.9 G/DL (ref 31.4–37.4)
MCV RBC AUTO: 92 FL (ref 82–98)
MONOCYTES # BLD AUTO: 0.42 THOUSAND/ΜL (ref 0.17–1.22)
MONOCYTES NFR BLD AUTO: 9 % (ref 4–12)
NEUTROPHILS # BLD AUTO: 2.53 THOUSANDS/ΜL (ref 1.85–7.62)
NEUTS SEG NFR BLD AUTO: 56 % (ref 43–75)
NONHDLC SERPL-MCNC: 121 MG/DL
NRBC BLD AUTO-RTO: 0 /100 WBCS
PLATELET # BLD AUTO: 226 THOUSANDS/UL (ref 149–390)
PMV BLD AUTO: 11 FL (ref 8.9–12.7)
POTASSIUM SERPL-SCNC: 3.9 MMOL/L (ref 3.5–5.3)
PROT SERPL-MCNC: 7.3 G/DL (ref 6.4–8.2)
PSA SERPL-MCNC: 0.5 NG/ML (ref 0–4)
RBC # BLD AUTO: 4.66 MILLION/UL (ref 3.88–5.62)
SODIUM SERPL-SCNC: 142 MMOL/L (ref 136–145)
TRIGL SERPL-MCNC: 111 MG/DL
WBC # BLD AUTO: 4.6 THOUSAND/UL (ref 4.31–10.16)

## 2021-03-08 PROCEDURE — 80061 LIPID PANEL: CPT

## 2021-03-08 PROCEDURE — 80053 COMPREHEN METABOLIC PANEL: CPT

## 2021-03-08 PROCEDURE — 84153 ASSAY OF PSA TOTAL: CPT

## 2021-03-08 PROCEDURE — 85025 COMPLETE CBC W/AUTO DIFF WBC: CPT

## 2021-03-08 PROCEDURE — 36415 COLL VENOUS BLD VENIPUNCTURE: CPT

## 2021-03-10 RX ORDER — SILDENAFIL CITRATE 20 MG/1
20 TABLET ORAL AS NEEDED
Qty: 30 TABLET | Refills: 3 | Status: SHIPPED | OUTPATIENT
Start: 2021-03-10 | End: 2021-09-22 | Stop reason: ALTCHOICE

## 2021-03-10 NOTE — TELEPHONE ENCOUNTER
Call placed to patient, advised per provider that PSA is now 0 5 and the recommendation is for follow up in six months with PSA ptv  Appointment scheduled and order placed in Epic for lab work  Patient agreeable to plan  Patient would like ED meds sent to pharmacy, was previously prescribed by Dr Trevino Cocopah in the past  Would like sent to his normal pharmacy to use Good Rx card  Advised will route to provider

## 2021-03-10 NOTE — TELEPHONE ENCOUNTER
Called and spoke with patient  Informed him that a new prescription for Sildenafil was sent to his pharmacy  Patient was appreciative of the call  He will call with any further questions or concerns

## 2021-03-10 NOTE — TELEPHONE ENCOUNTER
Patient of Dr Theresa Klein seen in [place] with history of [address] prostate cancer status post robot assisted laparoscopic prostatectomy, status post adjuvant radiation therapy, PSA 0 4 (08/21/2020)  Patient's 6 month follow up with PSA prior to visit was cancelled due to scheduling change  Informed to obtain testing and would call back to schedule  Patient's most recent PSA 0 5 (3/08/2021)  Routing to provider to determine needed follow up

## 2021-03-18 ENCOUNTER — OFFICE VISIT (OUTPATIENT)
Dept: FAMILY MEDICINE CLINIC | Facility: CLINIC | Age: 71
End: 2021-03-18
Payer: COMMERCIAL

## 2021-03-18 VITALS
WEIGHT: 193.4 LBS | TEMPERATURE: 97.9 F | HEIGHT: 69 IN | SYSTOLIC BLOOD PRESSURE: 116 MMHG | DIASTOLIC BLOOD PRESSURE: 72 MMHG | OXYGEN SATURATION: 98 % | HEART RATE: 70 BPM | BODY MASS INDEX: 28.64 KG/M2

## 2021-03-18 DIAGNOSIS — C61 CARCINOMA OF PROSTATE (HCC): Primary | ICD-10-CM

## 2021-03-18 DIAGNOSIS — M75.82 TENDONITIS OF LEFT ROTATOR CUFF: ICD-10-CM

## 2021-03-18 DIAGNOSIS — E78.5 BORDERLINE HYPERLIPIDEMIA: Primary | ICD-10-CM

## 2021-03-18 DIAGNOSIS — E78.5 BORDERLINE HYPERLIPIDEMIA: ICD-10-CM

## 2021-03-18 PROCEDURE — 1160F RVW MEDS BY RX/DR IN RCRD: CPT | Performed by: FAMILY MEDICINE

## 2021-03-18 PROCEDURE — 3008F BODY MASS INDEX DOCD: CPT | Performed by: FAMILY MEDICINE

## 2021-03-18 PROCEDURE — 3725F SCREEN DEPRESSION PERFORMED: CPT | Performed by: FAMILY MEDICINE

## 2021-03-18 PROCEDURE — 1036F TOBACCO NON-USER: CPT | Performed by: FAMILY MEDICINE

## 2021-03-18 PROCEDURE — 99214 OFFICE O/P EST MOD 30 MIN: CPT | Performed by: FAMILY MEDICINE

## 2021-03-18 NOTE — PROGRESS NOTES
Assessment/Plan:    1  Carcinoma of prostate ([address] )    2  Borderline hyperlipidemia    3  Tendonitis of left rotator cuff  Comments:  stable, no new sx        There are no Patient Instructions on file for this visit  Return in about 6 months (around 9/18/2021)  Subjective:      Patient ID: Gaby Jensen is a 69 y o  male  Chief Complaint   Patient presents with    Follow-up       Here for f/u  Lipids improved with statin  PSA being followed by urology  PSA went up to 0 5 from 0 4, has f/u in 6 months  No urinary symptoms at this point  Has been seeing Dr Raghu Márquez and Dr Lizbeth Juarez, has MOHS and 5FU for SCC/BCC  Has COVID vaccine next week  Never got AAA screen, order still valid  Neck mass or fat pad is stable  BMI Counseling: Body mass index is 28 56 kg/m²  The BMI is above normal  Nutrition recommendations include decreasing portion sizes, encouraging healthy choices of fruits and vegetables, consuming healthier snacks, limiting drinks that contain sugar and moderation in carbohydrate intake  Exercise recommendations include moderate physical activity 150 minutes/week and exercising 3-5 times per week  No pharmacotherapy was ordered  Patient referred to PCP due to patient being overweight  The following portions of the patient's history were reviewed and updated as appropriate: allergies, current medications, past family history, past medical history, past social history, past surgical history and problem list     Review of Systems   Constitutional: Negative for fatigue and fever  HENT: Negative for congestion  Eyes: Negative for visual disturbance  Respiratory: Negative for chest tightness and shortness of breath  Cardiovascular: Negative for chest pain and palpitations  Gastrointestinal: Negative for abdominal pain  Genitourinary: Negative for difficulty urinating, dysuria, frequency, hematuria and urgency  Musculoskeletal: Negative for arthralgias     Neurological: Negative for headaches  Hematological: Does not bruise/bleed easily  Current Outpatient Medications   Medication Sig Dispense Refill    Alpha-Lipoic Acid 100 MG TABS Take by mouth      Ascorbic Acid (VITAMIN C) 1000 MG tablet Take by mouth      Cholecalciferol (VITAMIN D3) 5000 units CAPS Take by mouth      Flax Oil-Fish Oil-Borage Oil (CVS OMEGA-3 PO) Take by mouth      Lycopene 5 MG CAPS Take 1 capsule by mouth daily      Misc Natural Products (GLUCOSAMINE CHONDROITIN ADV PO) Take by mouth      Misc Natural Products (TURMERIC CURCUMIN) CAPS Take by mouth      Multiple Vitamin (MULTIVITAMIN) capsule Take by mouth      rosuvastatin (CRESTOR) 5 mg tablet TAKE 1 TABLET BY MOUTH EVERY DAY 90 tablet 1    Selenium 200 MCG CAPS Take by mouth      sildenafil (REVATIO) 20 mg tablet Take 1 tablet (20 mg total) by mouth as needed (PRN) Take up to 5 tablets as needed 30 tablet 3    vitamin E, tocopherol, 400 units capsule Take by mouth      Zinc 30 MG CAPS Take by mouth       No current facility-administered medications for this visit  Objective:    /72 (BP Location: Right arm, Patient Position: Sitting, Cuff Size: Standard)   Pulse 70   Temp 97 9 °F (36 6 °C)   Ht 5' 9" (1 753 m)   Wt 87 7 kg (193 lb 6 4 oz)   SpO2 98%   BMI 28 56 kg/m²        Physical Exam  Vitals signs reviewed  Constitutional:       Appearance: Normal appearance  He is well-developed  Cardiovascular:      Rate and Rhythm: Normal rate and regular rhythm  Heart sounds: Normal heart sounds  Pulmonary:      Effort: Pulmonary effort is normal       Breath sounds: Normal breath sounds  Skin:     General: Skin is warm  Neurological:      Mental Status: He is alert and oriented to person, place, and time  Psychiatric:         Mood and Affect: Mood normal          Behavior: Behavior normal          Thought Content:  Thought content normal          Judgment: Judgment kenia Veliz MD

## 2021-03-19 ENCOUNTER — TELEPHONE (OUTPATIENT)
Dept: FAMILY MEDICINE CLINIC | Facility: CLINIC | Age: 71
End: 2021-03-19

## 2021-03-19 DIAGNOSIS — Z13.6 SCREENING FOR AAA (ABDOMINAL AORTIC ANEURYSM): Primary | ICD-10-CM

## 2021-03-19 NOTE — TELEPHONE ENCOUNTER
Pt called requesting updated order for aortic screening  He stated the order he currently has  in 2020

## 2021-03-22 NOTE — TELEPHONE ENCOUNTER
Called to make pt aware that order has been put in his chart for aortic screening    Provided pt w/ number for Central Scheduling to schedule testing

## 2021-03-23 ENCOUNTER — IMMUNIZATIONS (OUTPATIENT)
Dept: FAMILY MEDICINE CLINIC | Facility: HOSPITAL | Age: 71
End: 2021-03-23

## 2021-03-23 DIAGNOSIS — Z23 ENCOUNTER FOR IMMUNIZATION: Primary | ICD-10-CM

## 2021-03-23 PROCEDURE — 91300 SARS-COV-2 / COVID-19 MRNA VACCINE (PFIZER-BIONTECH) 30 MCG: CPT

## 2021-03-23 PROCEDURE — 0001A SARS-COV-2 / COVID-19 MRNA VACCINE (PFIZER-BIONTECH) 30 MCG: CPT

## 2021-03-31 ENCOUNTER — HOSPITAL ENCOUNTER (OUTPATIENT)
Dept: RADIOLOGY | Facility: MEDICAL CENTER | Age: 71
Discharge: HOME | End: 2021-03-31
Payer: COMMERCIAL

## 2021-03-31 DIAGNOSIS — Z13.6 SCREENING FOR AAA (ABDOMINAL AORTIC ANEURYSM): ICD-10-CM

## 2021-03-31 PROCEDURE — 76706 US ABDL AORTA SCREEN AAA: CPT

## 2021-04-14 ENCOUNTER — IMMUNIZATIONS (OUTPATIENT)
Dept: FAMILY MEDICINE CLINIC | Facility: HOSPITAL | Age: 71
End: 2021-04-14

## 2021-04-14 DIAGNOSIS — Z23 ENCOUNTER FOR IMMUNIZATION: Primary | ICD-10-CM

## 2021-04-14 PROCEDURE — 0002A SARS-COV-2 / COVID-19 MRNA VACCINE (PFIZER-BIONTECH) 30 MCG: CPT

## 2021-04-14 PROCEDURE — 91300 SARS-COV-2 / COVID-19 MRNA VACCINE (PFIZER-BIONTECH) 30 MCG: CPT

## 2021-08-10 DIAGNOSIS — E78.5 BORDERLINE HYPERLIPIDEMIA: ICD-10-CM

## 2021-08-11 RX ORDER — ROSUVASTATIN CALCIUM 5 MG/1
5 TABLET, COATED ORAL DAILY
Qty: 90 TABLET | Refills: 1 | Status: SHIPPED | OUTPATIENT
Start: 2021-08-11 | End: 2022-03-21 | Stop reason: SDUPTHER

## 2021-09-09 ENCOUNTER — TELEPHONE (OUTPATIENT)
Dept: FAMILY MEDICINE CLINIC | Facility: CLINIC | Age: 71
End: 2021-09-09

## 2021-09-09 ENCOUNTER — TELEPHONE (OUTPATIENT)
Dept: UROLOGY | Facility: AMBULATORY SURGERY CENTER | Age: 71
End: 2021-09-09

## 2021-09-09 NOTE — TELEPHONE ENCOUNTER
Patient called in requesting to email psa script to his email at [facility] [employer] [facility]  printed and emailed as requested

## 2021-09-13 ENCOUNTER — RA CDI HCC (OUTPATIENT)
Dept: OTHER | Facility: HOSPITAL | Age: 71
End: 2021-09-13

## 2021-09-13 NOTE — PROGRESS NOTES
[facility]  coding opportunities       Chart reviewed, no opportunity found: CHART REVIEWED, NO OPPORTUNITY FOUND                        Patients insurance company: [employer] Ad[employer] only)

## 2021-09-20 ENCOUNTER — APPOINTMENT (OUTPATIENT)
Dept: RADIOLOGY | Facility: MEDICAL CENTER | Age: 71
End: 2021-09-20
Payer: COMMERCIAL

## 2021-09-20 ENCOUNTER — OFFICE VISIT (OUTPATIENT)
Dept: FAMILY MEDICINE CLINIC | Facility: CLINIC | Age: 71
End: 2021-09-20
Payer: COMMERCIAL

## 2021-09-20 VITALS
SYSTOLIC BLOOD PRESSURE: 118 MMHG | DIASTOLIC BLOOD PRESSURE: 62 MMHG | BODY MASS INDEX: 26.75 KG/M2 | OXYGEN SATURATION: 98 % | HEART RATE: 70 BPM | WEIGHT: 180.6 LBS | TEMPERATURE: 98.1 F | HEIGHT: 69 IN

## 2021-09-20 DIAGNOSIS — M77.8 RIGHT WRIST TENDONITIS: ICD-10-CM

## 2021-09-20 DIAGNOSIS — Z00.00 MEDICARE ANNUAL WELLNESS VISIT, SUBSEQUENT: ICD-10-CM

## 2021-09-20 DIAGNOSIS — C61 CARCINOMA OF PROSTATE (HCC): Primary | ICD-10-CM

## 2021-09-20 PROBLEM — Z86.010 HISTORY OF COLONIC POLYPS: Status: ACTIVE | Noted: 2021-09-20

## 2021-09-20 PROBLEM — J30.9 ALLERGIC RHINITIS: Status: ACTIVE | Noted: 2021-09-20

## 2021-09-20 PROBLEM — C44.301: Status: ACTIVE | Noted: 2021-02-02

## 2021-09-20 PROBLEM — C44.309 SKIN CANCER OF FOREHEAD: Status: ACTIVE | Noted: 2021-02-02

## 2021-09-20 PROBLEM — Z86.0100 HISTORY OF COLONIC POLYPS: Status: ACTIVE | Noted: 2021-09-20

## 2021-09-20 PROCEDURE — 99214 OFFICE O/P EST MOD 30 MIN: CPT | Performed by: FAMILY MEDICINE

## 2021-09-20 PROCEDURE — 3725F SCREEN DEPRESSION PERFORMED: CPT | Performed by: FAMILY MEDICINE

## 2021-09-20 PROCEDURE — G0439 PPPS, SUBSEQ VISIT: HCPCS | Performed by: FAMILY MEDICINE

## 2021-09-20 PROCEDURE — 1170F FXNL STATUS ASSESSED: CPT | Performed by: FAMILY MEDICINE

## 2021-09-20 PROCEDURE — 3288F FALL RISK ASSESSMENT DOCD: CPT | Performed by: FAMILY MEDICINE

## 2021-09-20 PROCEDURE — 73110 X-RAY EXAM OF WRIST: CPT

## 2021-09-20 PROCEDURE — 1125F AMNT PAIN NOTED PAIN PRSNT: CPT | Performed by: FAMILY MEDICINE

## 2021-09-20 NOTE — PROGRESS NOTES
Assessment and Plan:     Problem List Items Addressed This Visit        Genitourinary    Carcinoma of prostate ([facility] [address] ) - Primary      Other Visit Diagnoses     Right wrist tendonitis        Relevant Orders    Ambulatory referral to Orthopedic Surgery    XR wrist 3+ vw right    Medicare annual wellness visit, subsequent               Preventive health issues were discussed with patient, and age appropriate screening tests were ordered as noted in patient's After Visit Summary  Personalized health advice and appropriate referrals for health education or preventive services given if needed, as noted in patient's After Visit Summary       History of Present Illness:     Patient presents for Medicare Annual Wellness visit    Patient Care Team:  Robert Jolly MD as PCP - General Tania Floras, PA-C Lindell Pallas, MD Corie Heir, MD Gaylon Millet, MD     Problem List:     Patient Active Problem List   Diagnosis    Borderline hyperlipidemia    Carcinoma [facility] prostate [facility])    Soft tissue swelling of chest wall    Left shoulder pain    Skin cancer of forehead    Cancer of skin of external nose    History of colonic polyps    Malignant tumor of prostate ([facility] [address] )    Allergic rhinitis      Past Medical and Surgical History:     Past Medical History:   Diagnosis Date    Arthritis of hand     Last Assessed: 10/2/2015    Basal cell carcinoma 2014    Last Assessed: 12/6/2017    Chest pain     Last Assessed: 10/14/2014    Fall from ladder     Last Assessed: 1/13/2017    Ganglion of left wrist     Last Assessed: 10/3/2016    Lump of skin     Lsat Assessed: 2/15/2013    Lyme disease     Malignant neoplasm of prostate ([facility] [address] ) 2011    Last Assessed: 4/1/2015    Neck mass     Last Assessed: 10/3/2016    Paresthesia of left thumb     Last Assessed: 2/7/2017    Rupture of radial collateral ligament of left thumb     Last Assessed: 2/7/2017     Past Surgical History:   Procedure Laterality Date    CARPAL TUNNEL RELEASE      PROSTATE SURGERY      ROTATOR CUFF REPAIR Left     TOE SURGERY Right     tip of middle toe removed on right foot      Family History:     Family History   Problem Relation Age of Onset    Cancer Mother     Uterine cancer Mother     Diabetes Father     Congenital heart disease Sister     Lung cancer Brother       Social History:     Social History     Socioeconomic History    Marital status: /Civil Union     Spouse name: None    Number of children: None    Years of education: None    Highest education level: None   Occupational History    None   Tobacco Use    Smoking status: Former Smoker     Packs/day: 1 50    Smokeless tobacco: Never Used    Tobacco comment: quit 1972   Vaping Use    Vaping Use: Never used   Substance and Sexual Activity    Alcohol use: Yes     Comment: social    Drug use: No    Sexual activity: None   Other Topics Concern    None   Social History Narrative    None     Social Determinants of Health     Financial Resource Strain:     Difficulty of Paying Living Expenses:    Food Insecurity:     Worried About Running Out of Food in the Last Year:     Ran Out of Food in the Last Year:    Transportation Needs:     Lack of Transportation (Medical):      Lack of Transportation (Non-Medical):    Physical Activity:     Days of Exercise per Week:     Minutes of Exercise per Session:    Stress:     Feeling of Stress :    Social Connections:     Frequency of Communication with Friends and Family:     Frequency of Social Gatherings with Friends and Family:     Attends [employer] Services:     Active Member of Clubs or Organizations:     Attends Club or Organization Meetings:     Marital Status:    Intimate Partner Violence:     Fear of Current or Ex-Partner:     Emotionally Abused:     Physically Abused:     Sexually Abused:       Medications and Allergies:     Current Outpatient Medications   Medication Sig Dispense Refill    Alpha-Lipoic Acid 100 MG TABS Take by mouth      Ascorbic Acid (VITAMIN C) 1000 MG tablet Take by mouth      Cholecalciferol (VITAMIN D3) 5000 units CAPS Take by mouth      Flax Oil-Fish Oil-Borage Oil (CVS OMEGA-3 PO) Take by mouth      Lycopene 5 MG CAPS Take 1 capsule by mouth daily      Misc Natural Products (GLUCOSAMINE CHONDROITIN ADV PO) Take by mouth      Misc Natural Products (TURMERIC CURCUMIN) CAPS Take by mouth      Multiple Vitamin (MULTIVITAMIN) capsule Take by mouth      rosuvastatin (CRESTOR) 5 mg tablet Take 1 tablet (5 mg total) by mouth daily 90 tablet 1    Selenium 200 MCG CAPS Take by mouth      sildenafil (REVATIO) 20 mg tablet Take 1 tablet (20 mg total) by mouth as needed (PRN) Take up to 5 tablets as needed 30 tablet 3    vitamin E, tocopherol, 400 units capsule Take by mouth      Zinc 30 MG CAPS Take by mouth       No current facility-administered medications for this visit  Allergies   Allergen Reactions    Grass Extracts [Gramineae Pollens]       Immunizations:     Immunization History   Administered Date(s) Administered    Pneumococcal Polysaccharide PPV23 08/18/2015    SARS-CoV-2 / COVID-19 mRNA IM (Pfizer-BioNTech) 03/23/2021, 04/14/2021      Health Maintenance:         Topic Date Due    Colorectal Cancer Screening  08/13/2022    Hepatitis C Screening  Completed         Topic Date Due    DTaP,Tdap,and Td Vaccines (1 - Tdap) Never done    Influenza Vaccine (1) 09/01/2021      Medicare Health Risk Assessment:     /62 (BP Location: Right arm, Patient Position: Sitting, Cuff Size: Standard)   Pulse 70   Temp 98 1 °F (36 7 °C)   Ht 5' 9" (1 753 m)   Wt 81 9 kg (180 lb 9 6 oz)   SpO2 98%   BMI 26 67 kg/m²      Tere Darling is here for his Subsequent Wellness visit  Last Medicare Wellness visit information reviewed, patient interviewed and updates made to the record today  Health Risk Assessment:   Patient rates overall health as good   Patient feels that their physical health rating is same  Patient is satisfied with their life  Eyesight was rated as same  Hearing was rated as same  Patient feels that their emotional and mental health rating is same  Patients states they are never, rarely angry  Patient states they are never, rarely unusually tired/fatigued  Pain experienced in the last 7 days has been none  Patient states that he has experienced no weight loss or gain in last 6 months  Depression Screening:   PHQ-2 Score: 0      Fall Risk Screening: In the past year, patient has experienced: no history of falling in past year      Home Safety:  Patient does not have trouble with stairs inside or outside of their home  Patient has working smoke alarms and has working carbon monoxide detector  Home safety hazards include: none  Nutrition:   Current diet is Regular  Medications:   Patient is currently taking over-the-counter supplements  OTC medications include: see medication list  Patient is able to manage medications  Activities of Daily Living (ADLs)/Instrumental Activities of Daily Living (IADLs):   Walk and transfer into and out of bed and chair?: Yes  Dress and groom yourself?: Yes    Bathe or shower yourself?: Yes    Feed yourself?  Yes  Do your laundry/housekeeping?: Yes  Manage your money, pay your bills and track your expenses?: Yes  Make your own meals?: Yes    Do your own shopping?: Yes    Previous Hospitalizations:   Any hospitalizations or ED visits within the last 12 months?: No      Advance Care Planning:   Living will: No    Advanced directive: No      Cognitive Screening:   Provider or family/friend/caregiver concerned regarding cognition?: No    PREVENTIVE SCREENINGS      Cardiovascular Screening:    General: Screening Not Indicated and History Lipid Disorder      Diabetes Screening:     General: Screening Current      Colorectal Cancer Screening:     General: Screening Current      Prostate Cancer Screening:    General: History Prostate Cancer Osteoporosis Screening:    General: Screening Not Indicated      Abdominal Aortic Aneurysm (AAA) Screening:    Risk factors include: age between 73-69 yo and tobacco use        Lung Cancer Screening:     General: Screening Not Indicated      Hepatitis C Screening:    General: Screening Current    Screening, Brief Intervention, and Referral to Treatment (SBIRT)    Screening  Typical number of drinks in a day: 0  Typical number of drinks in a week: 0  Interpretation: Low risk drinking behavior  AUDIT-C Screenin) How often did you have a drink containing alcohol in the past year? monthly or less  2) How many drinks did you have on a typical day when you were drinking in the past year?  1 to 2  3) How often did you have 6 or more drinks on one occasion in the past year? never    AUDIT-C Score: 1  Interpretation: Score 0-3 (male): Negative screen for alcohol misuse    Single Item Drug Screening:  How often have you used an illegal drug (including marijuana) or a prescription medication for non-medical reasons in the past year? never    Single Item Drug Screen Score: 0  Interpretation: Negative screen for possible drug use disorder      Tyler Malin MD

## 2021-09-20 NOTE — PROGRESS NOTES
Assessment/Plan:    1  Carcinoma of prostate ([facility] [address] )    2  Right wrist tendonitis  -     Ambulatory referral to Orthopedic Surgery; Future  -     XR wrist 3+ vw right; Future; Expected date: 09/20/2021    3  Medicare annual wellness visit, subsequent        There are no Patient Instructions on file for this visit  Return in about 6 months (around 3/20/2022)  Subjective:      Patient ID: Keyla Cooper is a 66 y o  male  Chief Complaint   Patient presents with    Follow-up    Medicare Wellness Visit       Follows with Dr Ayesha Lynch for prostate CA, PSA climbing slightly, checking every 6 months  BP controlled  Lipids at goal  R wrist still bothering him, when he rinses the coffee pot in the AM, occ drops things due to pain  started a year, mostly with abduction and adduction, not wrist flexion and extension  R handed  The following portions of the patient's history were reviewed and updated as appropriate: allergies, current medications, past family history, past medical history, past social history, past surgical history and problem list     Review of Systems   Constitutional: Negative for fatigue and fever  HENT: Negative for congestion  Eyes: Negative for visual disturbance  Respiratory: Negative for chest tightness and shortness of breath  Cardiovascular: Negative for chest pain and palpitations  Gastrointestinal: Negative for abdominal pain  Genitourinary: Negative for difficulty urinating  Musculoskeletal: Positive for arthralgias  Negative for joint swelling  Neurological: Negative for headaches  Hematological: Does not bruise/bleed easily           Current Outpatient Medications   Medication Sig Dispense Refill    Alpha-Lipoic Acid 100 MG TABS Take by mouth      Ascorbic Acid (VITAMIN C) 1000 MG tablet Take by mouth      Cholecalciferol (VITAMIN D3) 5000 units CAPS Take by mouth      Flax Oil-Fish Oil-Borage Oil ([employer] OMEGA-3 PO) Take by mouth      Lycopene 5 MG CAPS Take 1 capsule by mouth daily      Misc Natural Products (GLUCOSAMINE CHONDROITIN ADV PO) Take by mouth      Misc Natural Products (TURMERIC CURCUMIN) CAPS Take by mouth      Multiple Vitamin (MULTIVITAMIN) capsule Take by mouth      rosuvastatin (CRESTOR) 5 mg tablet Take 1 tablet (5 mg total) by mouth daily 90 tablet 1    Selenium 200 MCG CAPS Take by mouth      sildenafil (REVATIO) 20 mg tablet Take 1 tablet (20 mg total) by mouth as needed (PRN) Take up to 5 tablets as needed 30 tablet 3    vitamin E, tocopherol, 400 units capsule Take by mouth      Zinc 30 MG CAPS Take by mouth       No current facility-administered medications for this visit  Objective:    /62 (BP Location: Right arm, Patient Position: Sitting, Cuff Size: Standard)   Pulse 70   Temp 98 1 °F (36 7 °C)   Ht 5' 9" (1 753 m)   Wt 81 9 kg (180 lb 9 6 oz)   SpO2 98%   BMI 26 67 kg/m²        Physical Exam  Vitals reviewed  Constitutional:       Appearance: Normal appearance  He is well-developed  Cardiovascular:      Rate and Rhythm: Normal rate and regular rhythm  Heart sounds: Normal heart sounds  Pulmonary:      Effort: Pulmonary effort is normal       Breath sounds: Normal breath sounds  Musculoskeletal:         General: Tenderness (ulnar aspect of distal right wrist, no crepitus, tender with adduction, good grasp strength) present  Skin:     General: Skin is warm  Neurological:      Mental Status: He is alert and oriented to person, place, and time  Psychiatric:         Mood and Affect: Mood normal          Behavior: Behavior normal          Thought Content:  Thought content normal          Judgment: Judgment normal                 Gaviota Lindquist MD

## 2021-09-22 ENCOUNTER — OFFICE VISIT (OUTPATIENT)
Dept: UROLOGY | Facility: AMBULATORY SURGERY CENTER | Age: 71
End: 2021-09-22
Payer: COMMERCIAL

## 2021-09-22 VITALS
SYSTOLIC BLOOD PRESSURE: 140 MMHG | HEART RATE: 72 BPM | DIASTOLIC BLOOD PRESSURE: 82 MMHG | BODY MASS INDEX: 26.36 KG/M2 | HEIGHT: 69 IN | WEIGHT: 178 LBS

## 2021-09-22 DIAGNOSIS — N52.9 ERECTILE DYSFUNCTION, UNSPECIFIED ERECTILE DYSFUNCTION TYPE: Primary | ICD-10-CM

## 2021-09-22 DIAGNOSIS — C61 PROSTATE CANCER (HCC): ICD-10-CM

## 2021-09-22 PROCEDURE — 1160F RVW MEDS BY RX/DR IN RCRD: CPT | Performed by: NURSE PRACTITIONER

## 2021-09-22 PROCEDURE — 1036F TOBACCO NON-USER: CPT | Performed by: NURSE PRACTITIONER

## 2021-09-22 PROCEDURE — 99213 OFFICE O/P EST LOW 20 MIN: CPT | Performed by: NURSE PRACTITIONER

## 2021-09-22 PROCEDURE — 3008F BODY MASS INDEX DOCD: CPT | Performed by: NURSE PRACTITIONER

## 2021-09-22 RX ORDER — TADALAFIL 20 MG/1
20 TABLET ORAL AS NEEDED
Qty: 30 TABLET | Refills: 0 | Status: SHIPPED | OUTPATIENT
Start: 2021-09-22

## 2021-09-22 NOTE — PROGRESS NOTES
09/22/21    Ines Villa   1950   [id number]     Assessment  1 [place] 7 prostate cancer s/p RALP and adjuvant radiation therapy (2011)  2 ED    Discussion/Plan  1 [place] prostate cancer s/p RALP and adjuvant radiation therapy (2011)   9/10/21 PSA 0 5    AUA 2  2 ED   20 mg Tadalafil as needed on demand for sexual activity (GoodRx coupon provided)   Discontinue Sildenafil     Dr Dayna Mendoza recommends observation of PSA  We discussed that if his PSA continued to trend upward and approached 2 0, we would begin androgen deprivation therapy with Lupron and consider A[place] CT PET  Patient is comfortable with this  All questions answered at this time  He will return in 6 months with PSA  Subjective  HPI   Tayler Flores is a 66 y o  male with a history of [place] 7 prostate cancer  In 2011 he underwent a robot assisted laparoscopic prostatectomy  His PSA became detectable after surgery  He then received adjuvant radiation therapy  He returns in follow-up today  His most recent PSA is 0 5  He has not received Lupron  He was evaluated by medical oncology  He denies gross hematuria or bone pain  Review of Systems - History obtained from chart review and the patient  General ROS: negative  Psychological ROS: negative  ENT ROS: negative  Allergy and Immunology ROS: negative  Endocrine ROS: negative  Respiratory ROS: no cough, shortness of breath, or wheezing  Cardiovascular ROS: no chest pain or dyspnea on exertion  Gastrointestinal ROS: no abdominal pain, change in bowel habits, or black or bloody stools  Genito-Urinary ROS: positive for - erectile dysfunction  Musculoskeletal ROS: negative  Neurological ROS: negative  Dermatological ROS: negative     Objective  Physical Exam  Vitals and nursing note reviewed  Constitutional:       General: He is awake  He is not in acute distress  Appearance: Normal appearance  He is well-developed, well-groomed and normal weight   He is not ill-appearing, toxic-appearing or diaphoretic  Pulmonary:      Effort: Pulmonary effort is normal    Abdominal:      Tenderness: There is no right CVA tenderness or left CVA tenderness  Musculoskeletal:         General: Normal range of motion  Cervical back: Normal range of motion and neck supple  Skin:     General: Skin is warm  Neurological:      General: No focal deficit present  Mental Status: He is alert and oriented to person, place, and time  Mental status is at baseline  Psychiatric:         Attention and Perception: Attention normal          Mood and Affect: Mood normal          Speech: Speech normal          Behavior: Behavior normal  Behavior is cooperative  Thought Content:  Thought content normal          Cognition and Memory: Cognition normal          Judgment: Judgment normal            Component      Latest Ref Rng & Units 10/5/2016 5/26/2017 6/8/2018 11/13/2019   PSA, Total      0 0 - 4 0 ng/mL <0 1 <0 1 <0 1 0 3     Component      Latest Ref Rng & Units 2/26/2020 8/21/2020 3/8/2021   PSA, Total      0 0 - 4 0 ng/mL 0 3 0 4 0 5       VERENA Pang

## 2021-10-12 ENCOUNTER — CONSULT (OUTPATIENT)
Dept: OBGYN CLINIC | Facility: MEDICAL CENTER | Age: 71
End: 2021-10-12
Payer: COMMERCIAL

## 2021-10-12 VITALS
HEIGHT: 69 IN | HEART RATE: 84 BPM | SYSTOLIC BLOOD PRESSURE: 123 MMHG | DIASTOLIC BLOOD PRESSURE: 77 MMHG | WEIGHT: 178 LBS | BODY MASS INDEX: 26.36 KG/M2

## 2021-10-12 DIAGNOSIS — M77.8 RIGHT WRIST TENDONITIS: ICD-10-CM

## 2021-10-12 DIAGNOSIS — M18.11 PRIMARY OSTEOARTHRITIS OF FIRST CARPOMETACARPAL JOINT OF RIGHT HAND: ICD-10-CM

## 2021-10-12 DIAGNOSIS — M25.531 PAIN IN RIGHT WRIST: Primary | ICD-10-CM

## 2021-10-12 DIAGNOSIS — Q74.0 CONGENITAL POSITIVE ULNAR VARIANCE OF RIGHT WRIST: ICD-10-CM

## 2021-10-12 PROCEDURE — 1036F TOBACCO NON-USER: CPT | Performed by: EMERGENCY MEDICINE

## 2021-10-12 PROCEDURE — 99204 OFFICE O/P NEW MOD 45 MIN: CPT | Performed by: EMERGENCY MEDICINE

## 2021-12-13 ENCOUNTER — TELEPHONE (OUTPATIENT)
Dept: FAMILY MEDICINE CLINIC | Facility: CLINIC | Age: 71
End: 2021-12-13

## 2021-12-15 ENCOUNTER — OFFICE VISIT (OUTPATIENT)
Dept: OBGYN CLINIC | Facility: CLINIC | Age: 71
End: 2021-12-15
Payer: COMMERCIAL

## 2021-12-15 VITALS
SYSTOLIC BLOOD PRESSURE: 122 MMHG | WEIGHT: 181 LBS | HEIGHT: 70 IN | DIASTOLIC BLOOD PRESSURE: 70 MMHG | BODY MASS INDEX: 25.91 KG/M2 | RESPIRATION RATE: 17 BRPM | HEART RATE: 78 BPM

## 2021-12-15 DIAGNOSIS — Q74.0 CONGENITAL POSITIVE ULNAR VARIANCE OF RIGHT WRIST: Primary | ICD-10-CM

## 2021-12-15 DIAGNOSIS — M77.8 WRIST TENDONITIS: ICD-10-CM

## 2021-12-15 PROCEDURE — 99204 OFFICE O/P NEW MOD 45 MIN: CPT | Performed by: SURGERY

## 2021-12-15 PROCEDURE — 1036F TOBACCO NON-USER: CPT | Performed by: SURGERY

## 2021-12-15 PROCEDURE — 3008F BODY MASS INDEX DOCD: CPT | Performed by: SURGERY

## 2021-12-15 PROCEDURE — 1160F RVW MEDS BY RX/DR IN RCRD: CPT | Performed by: SURGERY

## 2021-12-15 PROCEDURE — 20605 DRAIN/INJ JOINT/BURSA W/O US: CPT | Performed by: SURGERY

## 2021-12-15 RX ORDER — AMOXICILLIN 500 MG/1
500 CAPSULE ORAL 4 TIMES DAILY
COMMUNITY
End: 2022-03-21 | Stop reason: ALTCHOICE

## 2021-12-15 RX ORDER — BUPIVACAINE HYDROCHLORIDE 2.5 MG/ML
0.5 INJECTION, SOLUTION INFILTRATION; PERINEURAL
Status: COMPLETED | OUTPATIENT
Start: 2021-12-15 | End: 2021-12-15

## 2021-12-15 RX ORDER — TRIAMCINOLONE ACETONIDE 40 MG/ML
20 INJECTION, SUSPENSION INTRA-ARTICULAR; INTRAMUSCULAR
Status: COMPLETED | OUTPATIENT
Start: 2021-12-15 | End: 2021-12-15

## 2021-12-15 RX ORDER — LIDOCAINE HYDROCHLORIDE 10 MG/ML
0.5 INJECTION, SOLUTION INFILTRATION; PERINEURAL
Status: COMPLETED | OUTPATIENT
Start: 2021-12-15 | End: 2021-12-15

## 2021-12-15 RX ADMIN — BUPIVACAINE HYDROCHLORIDE 0.5 ML: 2.5 INJECTION, SOLUTION INFILTRATION; PERINEURAL at 10:22

## 2021-12-15 RX ADMIN — LIDOCAINE HYDROCHLORIDE 0.5 ML: 10 INJECTION, SOLUTION INFILTRATION; PERINEURAL at 10:22

## 2021-12-15 RX ADMIN — TRIAMCINOLONE ACETONIDE 20 MG: 40 INJECTION, SUSPENSION INTRA-ARTICULAR; INTRAMUSCULAR at 10:22

## 2022-01-06 PROCEDURE — U0005 INFEC AGEN DETEC AMPLI PROBE: HCPCS | Performed by: FAMILY MEDICINE

## 2022-01-06 PROCEDURE — U0003 INFECTIOUS AGENT DETECTION BY NUCLEIC ACID (DNA OR RNA); SEVERE ACUTE RESPIRATORY SYNDROME CORONAVIRUS 2 (SARS-COV-2) (CORONAVIRUS DISEASE [COVID-19]), AMPLIFIED PROBE TECHNIQUE, MAKING USE OF HIGH THROUGHPUT TECHNOLOGIES AS DESCRIBED BY CMS-2020-01-R: HCPCS | Performed by: FAMILY MEDICINE

## 2022-01-10 PROCEDURE — U0005 INFEC AGEN DETEC AMPLI PROBE: HCPCS | Performed by: FAMILY MEDICINE

## 2022-01-10 PROCEDURE — U0003 INFECTIOUS AGENT DETECTION BY NUCLEIC ACID (DNA OR RNA); SEVERE ACUTE RESPIRATORY SYNDROME CORONAVIRUS 2 (SARS-COV-2) (CORONAVIRUS DISEASE [COVID-19]), AMPLIFIED PROBE TECHNIQUE, MAKING USE OF HIGH THROUGHPUT TECHNOLOGIES AS DESCRIBED BY CMS-2020-01-R: HCPCS | Performed by: FAMILY MEDICINE

## 2022-01-17 PROCEDURE — U0005 INFEC AGEN DETEC AMPLI PROBE: HCPCS | Performed by: FAMILY MEDICINE

## 2022-01-17 PROCEDURE — U0003 INFECTIOUS AGENT DETECTION BY NUCLEIC ACID (DNA OR RNA); SEVERE ACUTE RESPIRATORY SYNDROME CORONAVIRUS 2 (SARS-COV-2) (CORONAVIRUS DISEASE [COVID-19]), AMPLIFIED PROBE TECHNIQUE, MAKING USE OF HIGH THROUGHPUT TECHNOLOGIES AS DESCRIBED BY CMS-2020-01-R: HCPCS | Performed by: FAMILY MEDICINE

## 2022-03-15 LAB
ALBUMIN SERPL-MCNC: 4.5 G/DL (ref 3.6–5.1)
ALBUMIN/GLOB SERPL: 2 (CALC) (ref 1–2.5)
ALP SERPL-CCNC: 41 U/L (ref 35–144)
ALT SERPL-CCNC: 34 U/L (ref 9–46)
AST SERPL-CCNC: 23 U/L (ref 10–35)
BASOPHILS # BLD AUTO: 11 CELLS/UL (ref 0–200)
BASOPHILS NFR BLD AUTO: 0.3 %
BILIRUB SERPL-MCNC: 0.8 MG/DL (ref 0.2–1.2)
BUN SERPL-MCNC: 13 MG/DL (ref 7–25)
BUN/CREAT SERPL: NORMAL (CALC) (ref 6–22)
CALCIUM SERPL-MCNC: 9.9 MG/DL (ref 8.6–10.3)
CHLORIDE SERPL-SCNC: 107 MMOL/L (ref 98–110)
CHOLEST SERPL-MCNC: 189 MG/DL
CHOLEST/HDLC SERPL: 3.7 (CALC)
CO2 SERPL-SCNC: 29 MMOL/L (ref 20–32)
CREAT SERPL-MCNC: 0.95 MG/DL (ref 0.7–1.18)
EOSINOPHIL # BLD AUTO: 19 CELLS/UL (ref 15–500)
EOSINOPHIL NFR BLD AUTO: 0.5 %
ERYTHROCYTE [DISTWIDTH] IN BLOOD BY AUTOMATED COUNT: 13.1 % (ref 11–15)
GLOBULIN SER CALC-MCNC: 2.2 G/DL (CALC) (ref 1.9–3.7)
GLUCOSE SERPL-MCNC: 97 MG/DL (ref 65–99)
HCT VFR BLD AUTO: 39 % (ref 38.5–50)
HDLC SERPL-MCNC: 51 MG/DL
HGB BLD-MCNC: 13.4 G/DL (ref 13.2–17.1)
LDLC SERPL CALC-MCNC: 115 MG/DL (CALC)
LYMPHOCYTES # BLD AUTO: 1657 CELLS/UL (ref 850–3900)
LYMPHOCYTES NFR BLD AUTO: 43.6 %
MCH RBC QN AUTO: 30.5 PG (ref 27–33)
MCHC RBC AUTO-ENTMCNC: 34.4 G/DL (ref 32–36)
MCV RBC AUTO: 88.8 FL (ref 80–100)
MONOCYTES # BLD AUTO: 388 CELLS/UL (ref 200–950)
MONOCYTES NFR BLD AUTO: 10.2 %
NEUTROPHILS # BLD AUTO: 1725 CELLS/UL (ref 1500–7800)
NEUTROPHILS NFR BLD AUTO: 45.4 %
NONHDLC SERPL-MCNC: 138 MG/DL (CALC)
PLATELET # BLD AUTO: 228 THOUSAND/UL (ref 140–400)
PMV BLD REES-ECKER: 10.7 FL (ref 7.5–12.5)
POTASSIUM SERPL-SCNC: 4.3 MMOL/L (ref 3.5–5.3)
PROT SERPL-MCNC: 6.7 G/DL (ref 6.1–8.1)
PSA SERPL-MCNC: 0.77 NG/ML
RBC # BLD AUTO: 4.39 MILLION/UL (ref 4.2–5.8)
SL AMB EGFR AFRICAN AMERICAN: 92 ML/MIN/1.73M2
SL AMB EGFR NON AFRICAN AMERICAN: 80 ML/MIN/1.73M2
SODIUM SERPL-SCNC: 142 MMOL/L (ref 135–146)
TRIGL SERPL-MCNC: 115 MG/DL
WBC # BLD AUTO: 3.8 THOUSAND/UL (ref 3.8–10.8)

## 2022-03-21 ENCOUNTER — OFFICE VISIT (OUTPATIENT)
Dept: FAMILY MEDICINE CLINIC | Facility: CLINIC | Age: 72
End: 2022-03-21
Payer: COMMERCIAL

## 2022-03-21 VITALS
HEART RATE: 66 BPM | WEIGHT: 174.6 LBS | BODY MASS INDEX: 25 KG/M2 | SYSTOLIC BLOOD PRESSURE: 118 MMHG | DIASTOLIC BLOOD PRESSURE: 64 MMHG | TEMPERATURE: 97.4 F | HEIGHT: 70 IN | OXYGEN SATURATION: 99 %

## 2022-03-21 DIAGNOSIS — E78.5 BORDERLINE HYPERLIPIDEMIA: ICD-10-CM

## 2022-03-21 DIAGNOSIS — C61 PROSTATE CANCER (HCC): ICD-10-CM

## 2022-03-21 PROCEDURE — 3725F SCREEN DEPRESSION PERFORMED: CPT | Performed by: FAMILY MEDICINE

## 2022-03-21 PROCEDURE — 1036F TOBACCO NON-USER: CPT | Performed by: FAMILY MEDICINE

## 2022-03-21 PROCEDURE — 1160F RVW MEDS BY RX/DR IN RCRD: CPT | Performed by: FAMILY MEDICINE

## 2022-03-21 PROCEDURE — 3008F BODY MASS INDEX DOCD: CPT | Performed by: FAMILY MEDICINE

## 2022-03-21 PROCEDURE — 99214 OFFICE O/P EST MOD 30 MIN: CPT | Performed by: FAMILY MEDICINE

## 2022-03-21 RX ORDER — ROSUVASTATIN CALCIUM 5 MG/1
5 TABLET, COATED ORAL DAILY
Qty: 90 TABLET | Refills: 3 | Status: SHIPPED | OUTPATIENT
Start: 2022-03-21

## 2022-03-21 NOTE — PROGRESS NOTES
Assessment/Plan:    1  Borderline hyperlipidemia  Comments:  trial of crestor, recheck labs at next visit  Orders:  -     rosuvastatin (CRESTOR) 5 mg tablet; Take 1 tablet (5 mg total) by mouth daily  -     CBC and differential; Future; Expected date: 09/12/2022  -     Comprehensive metabolic panel; Future; Expected date: 09/12/2022  -     Lipid panel; Future; Expected date: 09/12/2022    2  Prostate cancer ([facility] [address] )        There are no Patient Instructions on file for this visit  Return in about 6 months (around 9/21/2022)  Subjective:      Patient ID: Navi Swanson is a 66 y o  male  Chief Complaint   Patient presents with    Follow-up       Here for f/u  Has f/u with urology this week for PSA, h/o prostate CA  Has surgery and radiation  PSA now at 0 77, slowly trending up  BP controlled  Lipids at goal with statin  The following portions of the patient's history were reviewed and updated as appropriate: allergies, current medications, past family history, past medical history, past social history, past surgical history and problem list     Review of Systems   Constitutional: Negative for fatigue and fever  HENT: Negative for congestion  Eyes: Negative for visual disturbance  Respiratory: Negative for chest tightness and shortness of breath  Cardiovascular: Negative for chest pain and palpitations  Gastrointestinal: Negative for abdominal pain  Genitourinary: Negative for difficulty urinating  Musculoskeletal: Negative for arthralgias  Neurological: Negative for headaches  Hematological: Does not bruise/bleed easily  BMI Counseling: Body mass index is 25 05 kg/m²  The BMI is above normal  Nutrition recommendations include decreasing portion sizes, encouraging healthy choices of fruits and vegetables, consuming healthier snacks, limiting drinks that contain sugar and moderation in carbohydrate intake   Exercise recommendations include moderate physical activity 150 minutes/week, exercising 3-5 times per week and strength training exercises  No pharmacotherapy was ordered  Patient referred to PCP  Rationale for BMI follow-up plan is due to patient being overweight or obese  Depression Screening and Follow-up Plan: Patient was screened for depression during today's encounter  They screened negative with a PHQ-2 score of 0  Current Outpatient Medications   Medication Sig Dispense Refill    Alpha-Lipoic Acid 100 MG TABS Take by mouth      Ascorbic Acid (VITAMIN C) 1000 MG tablet Take by mouth      Cholecalciferol (VITAMIN D3) 5000 units CAPS Take by mouth      Flax Oil-Fish Oil-Borage Oil ([employer] OMEGA-3 PO) Take by mouth      Lycopene 5 MG CAPS Take 1 capsule by mouth daily      Misc Natural Products (GLUCOSAMINE CHONDROITIN ADV PO) Take by mouth      Misc Natural Products (TURMERIC CURCUMIN) CAPS Take by mouth      Multiple Vitamin (MULTIVITAMIN) capsule Take by mouth      rosuvastatin (CRESTOR) 5 mg tablet Take 1 tablet (5 mg total) by mouth daily 90 tablet 3    Selenium 200 MCG CAPS Take by mouth      tadalafil (CIALIS) 20 MG tablet Take 1 tablet (20 mg total) by mouth as needed for erectile dysfunction Take on an empty stomach, 1 hour prior to activity, no alcohol  30 tablet 0    vitamin E, tocopherol, 400 units capsule Take by mouth      Zinc 30 MG CAPS Take by mouth       No current facility-administered medications for this visit  Objective:    /64 (BP Location: Right arm, Patient Position: Sitting, Cuff Size: Standard)   Pulse 66   Temp (!) 97 4 °F (36 3 °C)   Ht 5' 10" (1 778 m)   Wt 79 2 kg (174 lb 9 6 oz)   SpO2 99%   BMI 25 05 kg/m²        Physical Exam  Vitals reviewed  Constitutional:       Appearance: Normal appearance  He is well-developed  Cardiovascular:      Rate and Rhythm: Normal rate and regular rhythm  Heart sounds: Normal heart sounds     Pulmonary:      Effort: Pulmonary effort is normal       Breath sounds: Normal breath sounds  Skin:     General: Skin is warm  Neurological:      Mental Status: He is alert and oriented to person, place, and time  Psychiatric:         Mood and Affect: Mood normal          Behavior: Behavior normal          Thought Content:  Thought content normal          Judgment: Judgment normal                 Carol Bland MD

## 2022-05-12 NOTE — PROGRESS NOTES
5/16/2022    Lucero Villa  1950  [id number]      Assessment  -[place] (3+4) prostate cancer s/p radical prostatectomy, XRT (2011)  -Erectile dysfunction    Discussion/Plan  Josette Benitez is a 66 y o  male being managed by Dr Cheryle Rummage  1  [place] prostate cancer s/p radical prostatectomy, XRT (2011)- reviewed the results of his recent PSA which is 0 77, previously 0 5  Given slow interval of change, would recommend close observation of PSA level  Will hold off on androgen deprivation therapy at this time  He has no other complaints at this time  2  Erectile dysfunction- continue tadalafil as needed prior to sexual activity  Follow-up in 6 months with PSA  He was advised to call sooner with any issues     -All questions answered, patient agrees with plan      History of Present Illness  66 y o  male with a history of [place] 7 prostate cancer and ED presents today for follow up  Patient last seen in the office in September 2021  He has a history of [place] prostate cancer status post radical prostatectomy and adjuvant radiation therapy performed in 2011  Final pathology revealed negative surgical margins, 3+4=7 disease  His last PSA from 09/10/2021 was 0 5  He has never received androgen deprivation therapy  Patient uses tadalafil as needed prior to sexual activity  He denies any lower urinary tract symptoms, gross hematuria, incontinence, or dysuria  He denies any bone pain  Patient denies any strong family history of prostate cancer  No changes to his overall health  Review of Systems  Review of Systems   Constitutional: Negative  HENT: Negative  Respiratory: Negative  Cardiovascular: Negative  Gastrointestinal: Negative  Genitourinary: Negative for decreased urine volume, difficulty urinating, dysuria, flank pain, frequency, hematuria and urgency  Musculoskeletal: Negative  Skin: Negative  Neurological: Negative  Psychiatric/Behavioral: Negative        AUA SYMPTOM SCORE    Flowsheet Row Most Recent Value   AUA SYMPTOM SCORE    How often have you had a sensation of not emptying your bladder completely after you finished urinating? 1 (P)     How often have you had to urinate again less than two hours after you finished urinating? 1 (P)     How often have you found you stopped and started again several times when you urinate? 0 (P)     How often have you found it difficult to postpone urination? 0 (P)     How often have you had a weak urinary stream? 0 (P)     How often have you had to push or strain to begin urination? 0 (P)     How many times did you most typically get up to urinate from the time you went to bed at night until the time you got up in the morning? 2 (P)     Quality of Life: If you were to spend the rest of your life with your urinary condition just the way it is now, how would you feel about that? 0 (P)     AUA SYMPTOM SCORE 4 (P)           Past Medical History  Past Medical History:   Diagnosis Date    Arthritis of hand     Last Assessed: 10/2/2015    Basal cell carcinoma 2014    Last Assessed: 12/6/2017    Chest pain     Last Assessed: 10/14/2014    Fall from ladder     Last Assessed: 1/13/2017    Ganglion of left wrist     Last Assessed: 10/3/2016    Lump of skin     Lsat Assessed: 2/15/2013    Lyme disease     Malignant neoplasm of prostate ([facility] [address] 2011    Last Assessed: 4/1/2015    Neck mass     Last Assessed: 10/3/2016    Paresthesia of left thumb     Last Assessed: 2/7/2017    Rupture of radial collateral ligament of left thumb     Last Assessed: 2/7/2017       Past Social History  Past Surgical History:   Procedure Laterality Date    CARPAL TUNNEL RELEASE      PROSTATE SURGERY      ROTATOR CUFF REPAIR Left     TOE SURGERY Right     tip of middle toe removed on right foot       Past Family History  Family History   Problem Relation Age of Onset    Cancer Mother     Uterine cancer Mother     Diabetes Father     Congenital heart disease Sister     Lung cancer Brother        Past Social history  Social History     Socioeconomic History    Marital status: /Civil Union     Spouse name: Not on file    Number of children: Not on file    Years of education: Not on file    Highest education level: Not on file   Occupational History    Not on file   Tobacco Use    Smoking status: Former Smoker     Packs/day: 1 50    Smokeless tobacco: Never Used    Tobacco comment: quit 1972   Vaping Use    Vaping Use: Never used   Substance and Sexual Activity    Alcohol use: Yes     Comment: social    Drug use: No    Sexual activity: Not on file   Other Topics Concern    Not on file   Social History Narrative    Not on file     Social Determinants of Health     Financial Resource Strain: Not on file   Food Insecurity: Not on file   Transportation Needs: Not on file   Physical Activity: Not on file   Stress: Not on file   Social Connections: Not on file   Intimate Partner Violence: Not on file   Housing Stability: Not on file       Current Medications  Current Outpatient Medications   Medication Sig Dispense Refill    Alpha-Lipoic Acid 100 MG TABS Take by mouth      Ascorbic Acid (VITAMIN C) 1000 MG tablet Take by mouth      Cholecalciferol (VITAMIN D3) 5000 units CAPS Take by mouth      Flax Oil-Fish Oil-Borage Oil ([employer] OMEGA-3 PO) Take by mouth      Lycopene 5 MG CAPS Take 1 capsule by mouth daily      Misc Natural Products (GLUCOSAMINE CHONDROITIN ADV PO) Take by mouth      Misc Natural Products (TURMERIC CURCUMIN) CAPS Take by mouth      Multiple Vitamin (MULTIVITAMIN) capsule Take by mouth      rosuvastatin (CRESTOR) 5 mg tablet Take 1 tablet (5 mg total) by mouth daily 90 tablet 3    Selenium 200 MCG CAPS Take by mouth      tadalafil (CIALIS) 20 MG tablet Take 1 tablet (20 mg total) by mouth as needed for erectile dysfunction Take on an empty stomach, 1 hour prior to activity, no alcohol   30 tablet 0    vitamin E, tocopherol, 400 units capsule Take by mouth      Zinc 30 MG CAPS Take by mouth       No current facility-administered medications for this visit  Allergies  Allergies   Allergen Reactions    Grass Extracts [Gramineae Pollens]        Past Medical History, Social History, Family History, medications and allergies were reviewed  Vitals  There were no vitals filed for this visit  Physical Exam  Physical Exam  Constitutional:       Appearance: Normal appearance  He is well-developed  HENT:      Head: Normocephalic  Eyes:      Pupils: Pupils are equal, round, and reactive to light  Pulmonary:      Effort: Pulmonary effort is normal    Abdominal:      Palpations: Abdomen is soft  Musculoskeletal:         General: Normal range of motion  Cervical back: Normal range of motion  Skin:     General: Skin is warm and dry  Neurological:      General: No focal deficit present  Mental Status: He is alert and oriented to person, place, and time  Psychiatric:         Mood and Affect: Mood normal          Behavior: Behavior normal          Thought Content: Thought content normal          Judgment: Judgment normal          Results    I have personally reviewed all pertinent lab results and reviewed with patient  Lab Results   Component Value Date    PSA 0 77 03/14/2022    PSA 0 5 03/08/2021    PSA 0 4 08/21/2020     Lab Results   Component Value Date    GLUCOSE 80 10/03/2014    CALCIUM 9 9 03/14/2022     10/03/2014    K 4 3 03/14/2022    CO2 29 03/14/2022     03/14/2022    BUN 13 03/14/2022    CREATININE 0 95 03/14/2022     Lab Results   Component Value Date    WBC 3 8 03/14/2022    HGB 13 4 03/14/2022    HCT 39 0 03/14/2022    MCV 88 8 03/14/2022     03/14/2022     No results found for this or any previous visit (from the past 1 hour(s))

## 2022-05-16 ENCOUNTER — OFFICE VISIT (OUTPATIENT)
Dept: UROLOGY | Facility: AMBULATORY SURGERY CENTER | Age: 72
End: 2022-05-16
Payer: COMMERCIAL

## 2022-05-16 VITALS
HEIGHT: 70 IN | SYSTOLIC BLOOD PRESSURE: 118 MMHG | BODY MASS INDEX: 26.05 KG/M2 | OXYGEN SATURATION: 94 % | DIASTOLIC BLOOD PRESSURE: 84 MMHG | WEIGHT: 182 LBS | HEART RATE: 69 BPM

## 2022-05-16 DIAGNOSIS — N52.9 ERECTILE DYSFUNCTION, UNSPECIFIED ERECTILE DYSFUNCTION TYPE: ICD-10-CM

## 2022-05-16 DIAGNOSIS — C61 CARCINOMA OF PROSTATE (HCC): Primary | ICD-10-CM

## 2022-05-16 PROCEDURE — 1036F TOBACCO NON-USER: CPT | Performed by: NURSE PRACTITIONER

## 2022-05-16 PROCEDURE — 1160F RVW MEDS BY RX/DR IN RCRD: CPT | Performed by: NURSE PRACTITIONER

## 2022-05-16 PROCEDURE — 99213 OFFICE O/P EST LOW 20 MIN: CPT | Performed by: NURSE PRACTITIONER

## 2022-05-16 PROCEDURE — 3008F BODY MASS INDEX DOCD: CPT | Performed by: NURSE PRACTITIONER

## 2022-09-19 ENCOUNTER — RA CDI HCC (OUTPATIENT)
Dept: OTHER | Facility: HOSPITAL | Age: 72
End: 2022-09-19

## 2022-09-19 LAB
ALBUMIN SERPL-MCNC: 4.7 G/DL (ref 3.6–5.1)
ALBUMIN/GLOB SERPL: 2.2 (CALC) (ref 1–2.5)
ALP SERPL-CCNC: 51 U/L (ref 35–144)
ALT SERPL-CCNC: 17 U/L (ref 9–46)
AST SERPL-CCNC: 18 U/L (ref 10–35)
BASOPHILS # BLD AUTO: 10 CELLS/UL (ref 0–200)
BASOPHILS NFR BLD AUTO: 0.3 %
BILIRUB SERPL-MCNC: 0.7 MG/DL (ref 0.2–1.2)
BUN SERPL-MCNC: 18 MG/DL (ref 7–25)
BUN/CREAT SERPL: NORMAL (CALC) (ref 6–22)
CALCIUM SERPL-MCNC: 9.5 MG/DL (ref 8.6–10.3)
CHLORIDE SERPL-SCNC: 106 MMOL/L (ref 98–110)
CHOLEST SERPL-MCNC: 184 MG/DL
CHOLEST/HDLC SERPL: 3.1 (CALC)
CO2 SERPL-SCNC: 28 MMOL/L (ref 20–32)
CREAT SERPL-MCNC: 0.91 MG/DL (ref 0.7–1.28)
EOSINOPHIL # BLD AUTO: 10 CELLS/UL (ref 15–500)
EOSINOPHIL NFR BLD AUTO: 0.3 %
ERYTHROCYTE [DISTWIDTH] IN BLOOD BY AUTOMATED COUNT: 13.2 % (ref 11–15)
GFR/BSA.PRED SERPLBLD CYS-BASED-ARV: 89 ML/MIN/1.73M2
GLOBULIN SER CALC-MCNC: 2.1 G/DL (CALC) (ref 1.9–3.7)
GLUCOSE SERPL-MCNC: 85 MG/DL (ref 65–99)
HCT VFR BLD AUTO: 42.2 % (ref 38.5–50)
HDLC SERPL-MCNC: 59 MG/DL
HGB BLD-MCNC: 13.9 G/DL (ref 13.2–17.1)
LDLC SERPL CALC-MCNC: 111 MG/DL (CALC)
LYMPHOCYTES # BLD AUTO: 1435 CELLS/UL (ref 850–3900)
LYMPHOCYTES NFR BLD AUTO: 42.2 %
MCH RBC QN AUTO: 30.9 PG (ref 27–33)
MCHC RBC AUTO-ENTMCNC: 32.9 G/DL (ref 32–36)
MCV RBC AUTO: 93.8 FL (ref 80–100)
MONOCYTES # BLD AUTO: 350 CELLS/UL (ref 200–950)
MONOCYTES NFR BLD AUTO: 10.3 %
NEUTROPHILS # BLD AUTO: 1595 CELLS/UL (ref 1500–7800)
NEUTROPHILS NFR BLD AUTO: 46.9 %
NONHDLC SERPL-MCNC: 125 MG/DL (CALC)
PLATELET # BLD AUTO: 191 THOUSAND/UL (ref 140–400)
PMV BLD REES-ECKER: 10.7 FL (ref 7.5–12.5)
POTASSIUM SERPL-SCNC: 4.4 MMOL/L (ref 3.5–5.3)
PROT SERPL-MCNC: 6.8 G/DL (ref 6.1–8.1)
PSA SERPL-MCNC: 0.78 NG/ML
RBC # BLD AUTO: 4.5 MILLION/UL (ref 4.2–5.8)
SODIUM SERPL-SCNC: 139 MMOL/L (ref 135–146)
TRIGL SERPL-MCNC: 62 MG/DL
WBC # BLD AUTO: 3.4 THOUSAND/UL (ref 3.8–10.8)

## 2022-09-19 NOTE — PROGRESS NOTES
[facility]  coding opportunities       Chart reviewed, no opportunity found:   [address]        Patients Insurance     Medicare Insurance: [facility] Advantage

## 2022-09-26 ENCOUNTER — OFFICE VISIT (OUTPATIENT)
Dept: FAMILY MEDICINE CLINIC | Facility: CLINIC | Age: 72
End: 2022-09-26
Payer: COMMERCIAL

## 2022-09-26 ENCOUNTER — APPOINTMENT (OUTPATIENT)
Dept: RADIOLOGY | Facility: MEDICAL CENTER | Age: 72
End: 2022-09-26
Payer: COMMERCIAL

## 2022-09-26 VITALS
BODY MASS INDEX: 25.62 KG/M2 | OXYGEN SATURATION: 99 % | WEIGHT: 179 LBS | DIASTOLIC BLOOD PRESSURE: 64 MMHG | TEMPERATURE: 97.8 F | HEART RATE: 66 BPM | HEIGHT: 70 IN | SYSTOLIC BLOOD PRESSURE: 112 MMHG

## 2022-09-26 DIAGNOSIS — E78.5 BORDERLINE HYPERLIPIDEMIA: ICD-10-CM

## 2022-09-26 DIAGNOSIS — R22.41 MASS OF FOOT, RIGHT: ICD-10-CM

## 2022-09-26 DIAGNOSIS — Z00.00 MEDICARE ANNUAL WELLNESS VISIT, SUBSEQUENT: ICD-10-CM

## 2022-09-26 DIAGNOSIS — M77.02 MEDIAL EPICONDYLITIS OF LEFT ELBOW: ICD-10-CM

## 2022-09-26 DIAGNOSIS — C61 PROSTATE CANCER (HCC): Primary | ICD-10-CM

## 2022-09-26 PROCEDURE — 1125F AMNT PAIN NOTED PAIN PRSNT: CPT | Performed by: FAMILY MEDICINE

## 2022-09-26 PROCEDURE — 3288F FALL RISK ASSESSMENT DOCD: CPT | Performed by: FAMILY MEDICINE

## 2022-09-26 PROCEDURE — G0439 PPPS, SUBSEQ VISIT: HCPCS | Performed by: FAMILY MEDICINE

## 2022-09-26 PROCEDURE — 1170F FXNL STATUS ASSESSED: CPT | Performed by: FAMILY MEDICINE

## 2022-09-26 PROCEDURE — 1160F RVW MEDS BY RX/DR IN RCRD: CPT | Performed by: FAMILY MEDICINE

## 2022-09-26 PROCEDURE — 73630 X-RAY EXAM OF FOOT: CPT

## 2022-09-26 PROCEDURE — 3725F SCREEN DEPRESSION PERFORMED: CPT | Performed by: FAMILY MEDICINE

## 2022-09-26 PROCEDURE — 99214 OFFICE O/P EST MOD 30 MIN: CPT | Performed by: FAMILY MEDICINE

## 2022-09-26 NOTE — PROGRESS NOTES
Assessment and Plan:     Problem List Items Addressed This Visit        Other    Borderline hyperlipidemia    Relevant Orders    CBC and differential    Comprehensive metabolic panel    Lipid panel      Other Visit Diagnoses     Prostate cancer ([address] )    -  Primary    Medicare annual wellness visit, subsequent        Medial epicondylitis of left elbow        Mass of foot, right        Relevant Orders    XR foot 3+ vw right        BMI Counseling: Body mass index is 25 68 kg/m²  The BMI is above normal  Nutrition recommendations include decreasing portion sizes, encouraging healthy choices of fruits and vegetables, consuming healthier snacks, limiting drinks that contain sugar, moderation in carbohydrate intake and increasing intake of lean protein  Exercise recommendations include moderate physical activity 150 minutes/week, exercising 3-5 times per week and strength training exercises  No pharmacotherapy was ordered  Patient referred to PCP  Rationale for BMI follow-up plan is due to patient being overweight or obese  Depression Screening and Follow-up Plan: Patient was screened for depression during today's encounter  They screened negative with a PHQ-2 score of 0  Preventive health issues were discussed with patient, and age appropriate screening tests were ordered as noted in patient's After Visit Summary  Personalized health advice and appropriate referrals for health education or preventive services given if needed, as noted in patient's After Visit Summary  History of Present Illness:     Patient presents for a Medicare Wellness Visit    Here for f/u  Follows with urology for PSA and h/p prostate CA  Watching the rise in PSA which varies  Most recently very little change over 6 months  Would be too small to see anything on imaging so just watchful waiting at this point  Lipids at goal with statin, needs to restart Coq10   Notes numbness in the LUE, starts in the elbow and goes down to all the fingers  Feels like a funny bone  can last up to an hour, has to swing the arm to make the numbness go away  Sometimes has to rub the arm  No neck issues, no trauma  No elbow pain  No subjective weakness, trobuel closing his fist when the numbness at its worst  Sx are progressing over the last 4-5 months  Has been working at [employer], some heavy lifting  Uses the weed "[employer] every weekend  H/o herniated disks in the lower back, only treated with oral steroids and one epidural injection at the [place], has been under good control  Also c/o lump on R great toe  Feels like a shock when touched, even with clothing, 3 months  But no pain with direct pressure  Brother with gout  Lump is getting larger and the shocklike sensation is getting worse  No trouble with walking  Patient Care Team:  Leonila Elliott MD as PCP - General  ANASTACIA Metzger MD Carmela Hammond, MD Emeterio Needle, MD     Review of Systems:     Review of Systems   Constitutional: Negative for fatigue and fever  HENT: Negative for congestion  Eyes: Negative for visual disturbance  Respiratory: Negative for chest tightness and shortness of breath  Cardiovascular: Negative for chest pain and palpitations  Gastrointestinal: Negative for abdominal pain  Genitourinary: Negative for difficulty urinating  Musculoskeletal: Positive for arthralgias  Neurological: Positive for numbness  Negative for headaches  Hematological: Does not bruise/bleed easily          Problem List:     Patient Active Problem List   Diagnosis    Borderline hyperlipidemia    Carcinoma of prostate ([address] )    Soft tissue swelling of chest wall    Left shoulder pain    Skin cancer of forehead    Cancer of skin of external nose    History of colonic polyps    Malignant tumor of prostate ([address] )    Allergic rhinitis      Past Medical and Surgical History:     Past Medical History:   Diagnosis Date    Arthritis of hand     Last Assessed: 10/2/2015    Basal cell carcinoma 2014    Last Assessed: 12/6/2017    Chest pain     Last Assessed: 10/14/2014    Fall from ladder     Last Assessed: 1/13/2017    Ganglion of left wrist     Last Assessed: 10/3/2016    Lump of skin     Lsat Assessed: 2/15/2013    Lyme disease     Malignant ne[facility] of [facility] [address] [facility] 2011    Last Assessed: 4/1/2015    Neck mass     Last Assessed: 10/3/2016    Paresthesia of left thumb     Last Assessed: 2/7/2017    Rupture of radial collateral ligament of left thumb     Last Assessed: 2/7/2017     Past Surgical History:   Procedure Laterality Date    CARPAL TUNNEL RELEASE      PROSTATE SURGERY      ROTATOR CUFF REPAIR Left     TOE SURGERY Right     tip of middle toe removed on right foot      Family History:     Family History   Problem Relation Age of Onset    Cancer Mother     Uterine cancer Mother     Diabetes Father     Congenital heart disease Sister     Lung cancer Brother       Social History:     Social History     Socioeconomic History    Marital status: /Civil Union     Spouse name: None    Number of children: None    Years of education: None    Highest education level: None   Occupational History    None   Tobacco Use    Smoking status: Former Smoker     Packs/day: 1 50    Smokeless tobacco: Never Used    Tobacco comment: quit 1972   Vaping Use    Vaping Use: Never used   Substance and Sexual Activity    Alcohol use: Yes     Comment: social    Drug use: No    Sexual activity: None   Other Topics Concern    None   Social History Narrative    None     Social Determinants of Health     Financial Resource Strain: Low Risk     Difficulty of Paying Living Expenses: Not very hard   Food Insecurity: Not on file   Transportation Needs: No Transportation Needs    Lack of Transportation (Medical): No    Lack of Transportation (Non-Medical):  No   Physical Activity: Not on file   Stress: Not on file   Social Connections: Not on file Intimate Partner Violence: Not on file   Housing Stability: Not on file      Medications and Allergies:     Current Outpatient Medications   Medication Sig Dispense Refill    Alpha-Lipoic Acid 100 MG TABS Take by mouth      Ascorbic Acid (VITAMIN C) 1000 MG tablet Take by mouth      Cholecalciferol (VITAMIN D3) 5000 units CAPS Take by mouth      Flax Oil-Fish Oil-Borage Oil ([employer] OMEGA-3 PO) Take by mouth      Lycopene 5 MG CAPS Take 1 capsule by mouth daily      Misc Natural Products (GLUCOSAMINE CHONDROITIN ADV PO) Take by mouth      Misc Natural Products (TURMERIC CURCUMIN) CAPS Take by mouth      Multiple Vitamin (MULTIVITAMIN) capsule Take by mouth      rosuvastatin (CRESTOR) 5 mg tablet Take 1 tablet (5 mg total) by mouth daily 90 tablet 3    Selenium 200 MCG CAPS Take by mouth      vitamin E, tocopherol, 400 units capsule Take by mouth      Zinc 30 MG CAPS Take by mouth       No current facility-administered medications for this visit  Allergies   Allergen Reactions    Grass Extracts [Gramineae Pollens]       Immunizations:     Immunization History   Administered Date(s) Administered    COVID-19 PFIZER VACCINE 0 3 ML IM 03/23/2021, 04/14/2021    Pneumococcal Polysaccharide PPV23 08/18/2015      Health Maintenance:         Topic Date Due    Colorectal Cancer Screening  08/13/2022    Hepatitis C Screening  Completed         Topic Date Due    Pneumococcal Vaccine: 65+ Years (2 - PCV) 08/18/2016    COVID-19 Vaccine (3 - Booster for Pfizer series) 09/14/2021    Influenza Vaccine (1) 09/01/2022      Medicare Screening Tests and Risk Assessments:     Nellie Mars is here for his Subsequent Wellness visit  Last Medicare Wellness visit information reviewed, patient interviewed and updates made to the record today  Health Risk Assessment:   Patient rates overall health as very good  Patient feels that their physical health rating is same  Patient is very satisfied with their life   Eyesight was rated as slightly worse  Hearing was rated as same  Patient feels that their emotional and mental health rating is same  Patients states they are sometimes angry  Patient states they are sometimes unusually tired/fatigued  Pain experienced in the last 7 days has been some  Patient's pain rating has been 3/10  Patient states that he has experienced no weight loss or gain in last 6 months  Depression Screening:   PHQ-2 Score: 0      Fall Risk Screening: In the past year, patient has experienced: no history of falling in past year      Home Safety:  Patient does not have trouble with stairs inside or outside of their home  Patient has working smoke alarms and has working carbon monoxide detector  Home safety hazards include: none  Nutrition:   Current diet is Regular  Medications:   Patient is currently taking over-the-counter supplements  OTC medications include: Vitamins already listed  Patient is able to manage medications  Activities of Daily Living (ADLs)/Instrumental Activities of Daily Living (IADLs):   Walk and transfer into and out of bed and chair?: Yes  Dress and groom yourself?: Yes    Bathe or shower yourself?: Yes    Feed yourself?  Yes  Do your laundry/housekeeping?: Yes  Manage your money, pay your bills and track your expenses?: Yes  Make your own meals?: Yes    Do your own shopping?: Yes    Previous Hospitalizations:   Any hospitalizations or ED visits within the last 12 months?: No      Advance Care Planning:   Living will: No    Durable POA for healthcare: No    Advanced directive: No      Cognitive Screening:   Provider or family/friend/caregiver concerned regarding cognition?: No    PREVENTIVE SCREENINGS      Cardiovascular Screening:    General: Screening Not Indicated and History Lipid Disorder      Diabetes Screening:     General: Screening Current      Colorectal Cancer Screening:     General: Screening Current      Prostate Cancer Screening:    General: History Prostate Cancer      Osteoporosis Screening:    General: Screening Not Indicated      Abdominal Aortic Aneurysm (AAA) Screening:    Risk factors include: age between 73-69 yo and tobacco use        General: Screening Current      Lung Cancer Screening:     General: Screening Not Indicated      Hepatitis C Screening:    General: Screening Current    Screening, Brief Intervention, and Referral to Treatment (SBIRT)    Screening  Typical number of drinks in a day: 0  Typical number of drinks in a week: 0  Interpretation: Low risk drinking behavior  AUDIT-C Screenin) How often did you have a drink containing alcohol in the past year? monthly or less  2) How many drinks did you have on a typical day when you were drinking in the past year? 0  3) How often did you have 6 or more drinks on one occasion in the past year? never    AUDIT-C Score: 1  Interpretation: Score 0-3 (male): Negative screen for alcohol misuse    Single Item Drug Screening:  How often have you used an illegal drug (including marijuana) or a prescription medication for non-medical reasons in the past year? never    Single Item Drug Screen Score: 0  Interpretation: Negative screen for possible drug use disorder    No exam data present     Physical Exam:     /64 (BP Location: Right arm, Patient Position: Sitting, Cuff Size: Standard)   Pulse 66   Temp 97 8 °F (36 6 °C)   Ht 5' 10" (1 778 m)   Wt 81 2 kg (179 lb)   SpO2 99%   BMI 25 68 kg/m²     Physical Exam  Vitals reviewed  Constitutional:       Appearance: Normal appearance  He is normal weight  Cardiovascular:      Rate and Rhythm: Normal rate and regular rhythm  Heart sounds: Normal heart sounds  Pulmonary:      Effort: Pulmonary effort is normal       Breath sounds: Normal breath sounds  Musculoskeletal:         General: Swelling (over R great toe, minmal redness, tender with light touch, no pain with deep palpation, 1 cm raised lump, non fluctuant) and tenderness present   No signs of injury  Right lower leg: No edema  Left lower leg: No edema  Comments: Tender over medial epicondyle L elbow, no numbness today   Skin:     General: Skin is warm  Neurological:      Mental Status: He is alert and oriented to person, place, and time  Sensory: No sensory deficit  Motor: No weakness (UE strength intact b/l)  Psychiatric:         Mood and Affect: Mood normal          Behavior: Behavior normal          Thought Content:  Thought content normal          Judgment: Judgment normal           Jacqueline Montiel MD

## 2022-10-12 ENCOUNTER — OFFICE VISIT (OUTPATIENT)
Dept: FAMILY MEDICINE CLINIC | Facility: CLINIC | Age: 72
End: 2022-10-12
Payer: COMMERCIAL

## 2022-10-12 VITALS
WEIGHT: 178.6 LBS | TEMPERATURE: 97.6 F | HEIGHT: 70 IN | HEART RATE: 68 BPM | OXYGEN SATURATION: 98 % | SYSTOLIC BLOOD PRESSURE: 132 MMHG | BODY MASS INDEX: 25.57 KG/M2 | DIASTOLIC BLOOD PRESSURE: 70 MMHG

## 2022-10-12 DIAGNOSIS — M19.079 ARTHRITIS OF BIG TOE: ICD-10-CM

## 2022-10-12 DIAGNOSIS — R20.0 LEFT ARM NUMBNESS: Primary | ICD-10-CM

## 2022-10-12 PROCEDURE — 99214 OFFICE O/P EST MOD 30 MIN: CPT | Performed by: FAMILY MEDICINE

## 2022-10-12 RX ORDER — GABAPENTIN 100 MG/1
100 CAPSULE ORAL
Qty: 30 CAPSULE | Refills: 1 | Status: SHIPPED | OUTPATIENT
Start: 2022-10-12

## 2022-10-12 RX ORDER — PREDNISONE 20 MG/1
40 TABLET ORAL DAILY
Qty: 10 TABLET | Refills: 0 | Status: SHIPPED | OUTPATIENT
Start: 2022-10-12 | End: 2022-10-17

## 2022-10-12 NOTE — ASSESSMENT & PLAN NOTE
Two month history of ongoing left-handed numbness  Based on distribution, likely secondary to radial nerve entrapment  Patient has significant discomfort with pronation against resistance  Likely entrapment from pronator teres muscle  Will start patient on steroid, prednisone 40 mg for total 5 days  Patient may use ice 15 minutes at a time on the affected area up to 4 times a day  Will send to physical therapy for evaluation treatment    Patient has significant symptom at night, will start patient on gabapentin 100 mg at night for symptomatic relief

## 2022-10-12 NOTE — ASSESSMENT & PLAN NOTE
Degenerative change noted on big toe as well as the lateral deviation  Patient has significant pain with bending  Will refer to Podiatry for evaluation

## 2022-10-12 NOTE — PROGRESS NOTES
Name: Rob Drwe      : 1950      MRN: [id number]  Encounter Provider: Do Saha MD  Encounter Date: 10/12/2022   Encounter department: [address] [place] [address]  Left arm numbness  Assessment & Plan:  Two month history of ongoing left-handed numbness  Based on distribution, likely secondary to radial nerve entrapment  Patient has significant discomfort with pronation against resistance  Likely entrapment from pronator teres muscle  Will start patient on steroid, prednisone 40 mg for total 5 days  Patient may use ice 15 minutes at a time on the affected area up to 4 times a day  Will send to physical therapy for evaluation treatment    Patient has significant symptom at night, will start patient on gabapentin 100 mg at night for symptomatic relief    Orders:  -     Ambulatory Referral to Physical Therapy; Future  -     gabapentin (Neurontin) 100 mg capsule; Take 1 capsule (100 mg total) by mouth daily at bedtime  -     predniSONE 20 mg tablet; Take 2 tablets (40 mg total) by mouth daily for 5 days    2  Arthritis of big toe  Assessment & Plan:  Degenerative change noted on big toe as well as the lateral deviation  Patient has significant pain with bending  Will refer to Podiatry for evaluation           Subjective      HPI     59-year-old male patient presents for evaluation concerning numbness and tingling as well as discomfort/pain associated with his left arm  Patient is right-handed  This has been ongoing issue but patient notes worsening symptoms in the last 2 months, specifically worsening symptoms which causes patient difficulty with sleep  Patient reports numbness and tingling similar to his carpal tunnel syndrome which he had on his right wrist however the numbness does extend beyond his wrist up in to the right elbow and right upper arm    Pain is worse at night and patient also noticed worsening pain with prolonged use of self all or holding any object for prolonged period of time  Holding his arm up high for prolonged period time will cause hand to go numb  Patient has not tried any medications  Has tried compression sleeve without significant improvement symptoms  Patient has decreased  strength with significant symptoms  Review of Systems   Constitutional: Negative for chills and fever  HENT: Negative for congestion, rhinorrhea and sore throat  Respiratory: Negative for shortness of breath  Cardiovascular: Negative for chest pain  Gastrointestinal: Negative for abdominal pain  Neurological: Positive for weakness and numbness  Negative for dizziness, light-headedness and headaches  On the left hand            Current Outpatient Medications on File Prior to Visit   Medication Sig   • Alpha-Lipoic Acid 100 MG TABS Take by mouth   • Ascorbic Acid (VITAMIN C) 1000 MG tablet Take by mouth   • Cholecalciferol (VITAMIN D3) 5000 units CAPS Take by mouth   • Flax Oil-Fish Oil-Borage Oil ([employer] OMEGA-3 PO) Take by mouth   • Lycopene 5 MG CAPS Take 1 capsule by mouth daily   • Misc Natural Products (GLUCOSAMINE CHONDROITIN ADV PO) Take by mouth   • Misc Natural Products (TURMERIC CURCUMIN) CAPS Take by mouth   • Multiple Vitamin (MULTIVITAMIN) capsule Take by mouth   • rosuvastatin (CRESTOR) 5 mg tablet Take 1 tablet (5 mg total) by mouth daily   • Selenium 200 MCG CAPS Take by mouth   • vitamin E, tocopherol, 400 units capsule Take by mouth   • Zinc 30 MG CAPS Take by mouth       Objective     /70 (BP Location: Left arm, Patient Position: Sitting, Cuff Size: Standard)   Pulse 68   Temp 97 6 °F (36 4 °C)   Ht 5' 10" (1 778 m)   Wt 81 kg (178 lb 9 6 oz)   SpO2 98%   BMI 25 63 kg/m²     Physical Exam  Vitals reviewed  Constitutional:       General: He is not in acute distress  Appearance: Normal appearance  He is not ill-appearing, toxic-appearing or diaphoretic  Cardiovascular:      Rate and Rhythm: Normal rate        Pulses: Normal pulses  Heart sounds: Normal heart sounds  Pulmonary:      Effort: Pulmonary effort is normal       Breath sounds: Normal breath sounds  Abdominal:      General: Abdomen is flat  Musculoskeletal:         General: No swelling, tenderness or signs of injury  Skin:     General: Skin is warm and dry  Capillary Refill: Capillary refill takes less than 2 seconds  Coloration: Skin is not jaundiced  Neurological:      General: No focal deficit present  Mental Status: He is alert and oriented to person, place, and time  Sensory: Sensory deficit present        Comments: Patient has significant discomfort with pronation against resistance  Negative Finkelstein sign negative Phalen sign   Psychiatric:         Mood and Affect: Mood normal             Moshe Taylor MD

## 2022-11-14 ENCOUNTER — OFFICE VISIT (OUTPATIENT)
Dept: UROLOGY | Facility: AMBULATORY SURGERY CENTER | Age: 72
End: 2022-11-14

## 2022-11-14 VITALS
WEIGHT: 176 LBS | OXYGEN SATURATION: 98 % | BODY MASS INDEX: 25.25 KG/M2 | DIASTOLIC BLOOD PRESSURE: 72 MMHG | HEART RATE: 65 BPM | SYSTOLIC BLOOD PRESSURE: 118 MMHG

## 2022-11-14 DIAGNOSIS — C61 MALIGNANT TUMOR OF PROSTATE (HCC): ICD-10-CM

## 2022-11-14 DIAGNOSIS — C61 PROSTATE CANCER (HCC): Primary | ICD-10-CM

## 2022-11-14 NOTE — PROGRESS NOTES
11/14/2022    Uzair Galdamezira  1950  [id number]      Assessment  -Nhi 7 (3+4) prostate cancer s/p radical prostatectomy, XRT (2011)  -Erectile dysfunction    Discussion/Plan  Amaris Germain is a 67 y o  male being managed by Dr Viktoria Alvarado  1  Nhi 7 (3+4) prostate cancer s/p radical prostatectomy, XRT (2011)- we reviewed the results of his recent PSA which is now 0 78, previously 0 77 (3/2022)  Although his PSA remains low, it has been very slowly rising over the last year  Per prior recommendation from Dr Viktoria Alvarado, he advises to proceed with PSMA PET scan  Patient is amenable with this plan  He will follow-up in the office to discuss results  Patient has no other urinary complaints this time  He will continue to follow with PCP in regards to left hand/wrist numbness/tingling  Follow-up to review results of PSMA PET scan  He was advised to call sooner with any questions or issues       -All questions answered, patient agrees with plan      History of Present Illness  67 y o  male with a history of [place] 7 prostate cancer and ED presents today for follow up  Patient last seen in the office in May 2022  He has a history of [place] 7 prostate cancer status post radical prostatectomy and adjuvant radiation therapy performed in 2011  Final pathology revealed negative surgical margins, 3+4=7 disease  His last PSA from 09/10/2021 was 0 5  He has never received androgen deprivation therapy  His last PSA was 0 77  Patient denies any lower urinary tract symptoms, gross hematuria, or dysuria  He does note occasional nocturia and gets up 1-2 times per night to void  Patient states he has been experiencing numbness and tingling in his left wrist/hand for the past 6-8 months  Patient has been following with his PCP who states it is likely a pinched nerve  However, he has a prior history of right carpal tunnel  Review of Systems  Review of Systems   Constitutional: Negative  HENT: Negative  Respiratory: Negative  Cardiovascular: Negative  Gastrointestinal: Negative  Genitourinary: Negative for decreased urine volume, difficulty urinating, dysuria, flank pain, frequency, hematuria and urgency  Musculoskeletal: Negative  Skin: Negative  Neurological: Negative  Psychiatric/Behavioral: Negative        AUA SYMPTOM SCORE    Flowsheet Row Most Recent Value   AUA SYMPTOM SCORE    How often have you had a sensation of not emptying your bladder completely after you finished urinating? 0 (P)     How often have you had to urinate again less than two hours after you finished urinating? 0 (P)     How often have you found you stopped and started again several times when you urinate? 0 (P)     How often have you found it difficult to postpone urination? 1 (P)     How often have you had a weak urinary stream? 0 (P)     How often have you had to push or strain to begin urination? 0 (P)     How many times did you most typically get up to urinate from the time you went to bed at night until the time you got up in the morning? 2 (P)     Quality of Life: If you were to spend the rest of your life with your urinary condition just the way it is now, how would you feel about that? 1 (P)     AUA SYMPTOM SCORE 3 (P)           Past Medical History  Past Medical History:   Diagnosis Date   • Arthritis of hand     Last Assessed: 10/2/2015   • Basal cell carcinoma 2014    Last Assessed: 12/6/2017   • Chest pain     Last Assessed: 10/14/2014   • Fall from ladder     Last Assessed: 1/13/2017   • Ganglion of left wrist     Last Assessed: 10/3/2016   • Lump of skin     Lsat Assessed: 2/15/2013   • Lyme disease    • Malignant neoplasm of prostate ([facility] [address] ) 2011    Last Assessed: 4/1/2015   • Neck mass     Last Assessed: 10/3/2016   • Paresthesia of left thumb     Last Assessed: 2/7/2017   • Rupture of radial collateral ligament of left thumb     Last Assessed: 2/7/2017       Past Social History  Past Surgical History: Procedure Laterality Date   • CARPAL TUNNEL RELEASE     • PROSTATE SURGERY     • ROTATOR CUFF REPAIR Left    • TOE SURGERY Right     tip of middle toe removed on right foot       Past Family History  Family History   Problem Relation Age of Onset   • Cancer Mother    • Uterine cancer Mother    • Diabetes Father    • Congenital heart disease Sister    • Lung cancer Brother        Past Social history  Social History     Socioeconomic History   • Marital status: /Civil Union     Spouse name: Not on file   • Number of children: Not on file   • Years of education: Not on file   • Highest education level: Not on file   Occupational History   • Not on file   Tobacco Use   • Smoking status: Former Smoker     Packs/day: 1 50   • Smokeless tobacco: Never Used   • Tobacco comment: quit 1972   Vaping Use   • Vaping Use: Never used   Substance and Sexual Activity   • Alcohol use: Yes     Comment: social   • Drug use: No   • Sexual activity: Not on file   Other Topics Concern   • Not on file   Social History Narrative   • Not on file     Social Determinants of Health     Financial Resource Strain: Low Risk    • Difficulty of Paying Living Expenses: Not very hard   Food Insecurity: Not on file   Transportation Needs: No Transportation Needs   • Lack of Transportation (Medical): No   • Lack of Transportation (Non-Medical):  No   Physical Activity: Not on file   Stress: Not on file   Social Connections: Not on file   Intimate Partner Violence: Not on file   Housing Stability: Not on file       Current Medications  Current Outpatient Medications   Medication Sig Dispense Refill   • Alpha-Lipoic Acid 100 MG TABS Take by mouth     • Ascorbic Acid (VITAMIN C) 1000 MG tablet Take by mouth     • Cholecalciferol (VITAMIN D3) 5000 units CAPS Take by mouth     • Flax Oil-Fish Oil-Borage Oil ([employer] OMEGA-3 PO) Take by mouth     • gabapentin (Neurontin) 100 mg capsule Take 1 capsule (100 mg total) by mouth daily at bedtime 30 capsule 1 • Lycopene 5 MG CAPS Take 1 capsule by mouth daily     • Misc Natural Products (GLUCOSAMINE CHONDROITIN ADV PO) Take by mouth     • Misc Natural Products (TURMERIC CURCUMIN) CAPS Take by mouth     • Multiple Vitamin (MULTIVITAMIN) capsule Take by mouth     • rosuvastatin (CRESTOR) 5 mg tablet Take 1 tablet (5 mg total) by mouth daily 90 tablet 3   • Selenium 200 MCG CAPS Take by mouth     • vitamin E, tocopherol, 400 units capsule Take by mouth     • Zinc 30 MG CAPS Take by mouth       No current facility-administered medications for this visit  Allergies  Allergies   Allergen Reactions   • Grass Extracts [Gramineae Pollens]        Past Medical History, Social History, Family History, medications and allergies were reviewed  Vitals  There were no vitals filed for this visit  Physical Exam  Physical Exam  Constitutional:       Appearance: Normal appearance  He is well-developed  HENT:      Head: Normocephalic  Eyes:      Pupils: Pupils are equal, round, and reactive to light  Pulmonary:      Effort: Pulmonary effort is normal    Abdominal:      Palpations: Abdomen is soft  Musculoskeletal:         General: Normal range of motion  Cervical back: Normal range of motion  Skin:     General: Skin is warm and dry  Neurological:      General: No focal deficit present  Mental Status: He is alert and oriented to person, place, and time  Psychiatric:         Mood and Affect: Mood normal          Behavior: Behavior normal          Thought Content:  Thought content normal          Judgment: Judgment normal          Results    I have personally reviewed all pertinent lab results and reviewed with patient  Lab Results   Component Value Date    PSA 0 78 09/19/2022    PSA 0 77 03/14/2022    PSA 0 5 03/08/2021     Lab Results   Component Value Date    GLUCOSE 80 10/03/2014    CALCIUM 9 5 09/19/2022     10/03/2014    K 4 4 09/19/2022    CO2 28 09/19/2022     09/19/2022    BUN 18 09/19/2022    CREATININE 0 91 09/19/2022     Lab Results   Component Value Date    WBC 3 4 (L) 09/19/2022    HGB 13 9 09/19/2022    HCT 42 2 09/19/2022    MCV 93 8 09/19/2022     09/19/2022     No results found for this or any previous visit (from the past 1 hour(s))

## 2022-12-02 ENCOUNTER — HOSPITAL ENCOUNTER (OUTPATIENT)
Dept: RADIOLOGY | Age: 72
Discharge: HOME | End: 2022-12-02

## 2022-12-02 DIAGNOSIS — C61 PROSTATE CANCER (HCC): ICD-10-CM

## 2022-12-02 DIAGNOSIS — R97.20 RISING PSA LEVEL: ICD-10-CM

## 2022-12-15 ENCOUNTER — OFFICE VISIT (OUTPATIENT)
Dept: UROLOGY | Facility: AMBULATORY SURGERY CENTER | Age: 72
End: 2022-12-15

## 2022-12-15 VITALS
OXYGEN SATURATION: 99 % | BODY MASS INDEX: 25.2 KG/M2 | HEART RATE: 66 BPM | HEIGHT: 70 IN | SYSTOLIC BLOOD PRESSURE: 130 MMHG | WEIGHT: 176 LBS | DIASTOLIC BLOOD PRESSURE: 80 MMHG

## 2022-12-15 DIAGNOSIS — C61 PROSTATE CANCER (HCC): Primary | ICD-10-CM

## 2022-12-15 NOTE — PROGRESS NOTES
12/15/2022    Alfredo Davis  1950  [id number]        Assessment  History Nhi 7 prostate cancer status post robot-assisted laparoscopic prostatectomy performed in 2011, external radiation therapy 2012, PSMA CT PET scan without disease last radiotracer activity    Discussion  We discussed his detectable PSA after surgery and radiation  We reviewed that his PSMA CT PET scan is fortunately negative for demonstrable disease  I recommend that we continue to follow him conservatively with a repeat PSA in 6 months time  If the PSA continues to rise we may consider repeating a PSMA CT PET scan versus continued observation versus androgen deprivation therapy  He will follow with dermatology for the scalp lesion  History of Present Illness  67 y o  male with a history of 7 prostate cancer  He was treated with robot-assisted laparoscopic prostatectomy in 2011  He then developed PSA recurrence and received external beam radiation therapy in 2012  The margin  His postoperative PSA was 0 7 and after radiation his PSA was undetectable at less than 0 1, however, more recently was in and is noted to be 0 78  This prompted a CT PET scan which fortunately shows no evidence of activity or uptake            AUA Symptom Score  AUA SYMPTOM SCORE    Flowsheet Row Most Recent Value   AUA SYMPTOM SCORE    How often have you had a sensation of not emptying your bladder completely after you finished urinating? 0 (P)     How often have you had to urinate again less than two hours after you finished urinating? 0 (P)     How often have you found you stopped and started again several times when you urinate? 0 (P)     How often have you found it difficult to postpone urination? 1 (P)     How often have you had a weak urinary stream? 0 (P)     How often have you had to push or strain to begin urination? 0 (P)     How many times did you most typically get up to urinate from the time you went to bed at night until the time you got up in the morning? 1 (P)     Quality of Life: If you were to spend the rest of your life with your urinary condition just the way it is now, how would you feel about that? 0 (P)     AUA SYMPTOM SCORE 2 (P)           Review of Systems  Review of Systems      Past Medical History  Past Medical History:   Diagnosis Date   • Arthritis of hand     Last Assessed: 10/2/2015   • Basal cell carcinoma     Last Assessed: 2017   • Chest pain     Last Assessed: 10/14/2014   • Fall from ladder     Last Assessed: 2017   • Ganglion of left wrist     Last Assessed: 10/3/2016   • Lump of skin     Lsat Assessed: 2/15/2013   • Lyme disease    • Malignant neoplasm of prostate ([facility] [address] )     Last Assessed: 2015   • Neck mass     Last Assessed: 10/3/2016   • Paresthesia of left thumb     Last Assessed: 2017   • Rupture of radial collateral ligament of left thumb     Last Assessed: 2017       Past Social History  Past Surgical History:   Procedure Laterality Date   • CARPAL TUNNEL RELEASE     • PROSTATE SURGERY     • ROTATOR CUFF REPAIR Left    • TOE SURGERY Right     tip of middle toe removed on right foot       Past Family History  Family History   Problem Relation Age of Onset   • Cancer Mother    • Uterine cancer Mother    • Diabetes Father    • Congenital heart disease Sister    • Lung cancer Brother        Past Social history  Social History     Socioeconomic History   • Marital status: /Civil Union     Spouse name: Not on file   • Number of children: Not on file   • Years of education: Not on file   • Highest education level: Not on file   Occupational History   • Not on file   Tobacco Use   • Smoking status: Former     Packs/day: 1 50     Years: 5 00     Pack years: 7 50     Types: Cigarettes     Quit date:      Years since quittin 9   • Smokeless tobacco: Never   • Tobacco comments:     quit 1972   Vaping Use   • Vaping Use: Never used   Substance and Sexual Activity   • Alcohol use: Yes     Comment: social   • Drug use: No   • Sexual activity: Not Currently   Other Topics Concern   • Not on file   Social History Narrative   • Not on file     Social Determinants of Health     Financial Resource Strain: Low Risk    • Difficulty of Paying Living Expenses: Not very hard   Food Insecurity: Not on file   Transportation Needs: No Transportation Needs   • Lack of Transportation (Medical): No   • Lack of Transportation (Non-Medical): No   Physical Activity: Not on file   Stress: Not on file   Social Connections: Not on file   Intimate Partner Violence: Not on file   Housing Stability: Not on file       Current Medications  Current Outpatient Medications   Medication Sig Dispense Refill   • Alpha-Lipoic Acid 100 MG TABS Take by mouth     • Ascorbic Acid (VITAMIN C) 1000 MG tablet Take by mouth     • Cholecalciferol (VITAMIN D3) 5000 units CAPS Take by mouth     • Flax Oil-Fish Oil-Borage Oil ([employer]S OMEGA-3 PO) Take by mouth     • Lycopene 5 MG CAPS Take 1 capsule by mouth daily     • Misc Natural Products (GLUCOSAMINE CHONDROITIN ADV PO) Take by mouth     • Misc Natural Products (TURMERIC CURCUMIN) CAPS Take by mouth     • Multiple Vitamin (MULTIVITAMIN) capsule Take by mouth     • rosuvastatin (CRESTOR) 5 mg tablet Take 1 tablet (5 mg total) by mouth daily 90 tablet 3   • Selenium 200 MCG CAPS Take by mouth     • vitamin E, tocopherol, 400 units capsule Take by mouth     • Zinc 30 MG CAPS Take by mouth     • gabapentin (Neurontin) 100 mg capsule Take 1 capsule (100 mg total) by mouth daily at bedtime (Patient not taking: Reported on 11/14/2022) 30 capsule 1     No current facility-administered medications for this visit  Allergies  Allergies   Allergen Reactions   • Grass Extracts [Gramineae Pollens]        Past Medical History, Social History, Family History, medications and allergies were reviewed      Vitals  Vitals:    12/15/22 0800   BP: 130/80   Pulse: 66   SpO2: 99%   Weight: 79 8 kg (176 lb) Height: 5' 10" (1 778 m)       Physical Exam  Physical Exam  On examination he is in no acute distress  A raised brown scalp lesion is appreciated in the right frontal region    Gait normal   Affect normal      Results  Lab Results   Component Value Date    PSA 0 78 09/19/2022    PSA 0 77 03/14/2022    PSA 0 5 03/08/2021     Lab Results   Component Value Date    GLUCOSE 80 10/03/2014    CALCIUM 9 5 09/19/2022     10/03/2014    K 4 4 09/19/2022    CO2 28 09/19/2022     09/19/2022    BUN 18 09/19/2022    CREATININE 0 91 09/19/2022     Lab Results   Component Value Date    WBC 3 4 (L) 09/19/2022    HGB 13 9 09/19/2022    HCT 42 2 09/19/2022    MCV 93 8 09/19/2022     09/19/2022         Office Urine Dip  No results found for this or any previous visit (from the past 1 hour(s)) ]

## 2023-03-20 ENCOUNTER — RA CDI HCC (OUTPATIENT)
Dept: OTHER | Facility: HOSPITAL | Age: 73
End: 2023-03-20

## 2023-03-20 LAB
ALBUMIN SERPL-MCNC: 4.7 G/DL (ref 3.6–5.1)
ALBUMIN/GLOB SERPL: 2.2 (CALC) (ref 1–2.5)
ALP SERPL-CCNC: 48 U/L (ref 35–144)
ALT SERPL-CCNC: 17 U/L (ref 9–46)
AST SERPL-CCNC: 15 U/L (ref 10–35)
BASOPHILS # BLD AUTO: 9 CELLS/UL (ref 0–200)
BASOPHILS NFR BLD AUTO: 0.2 %
BILIRUB SERPL-MCNC: 0.7 MG/DL (ref 0.2–1.2)
BUN SERPL-MCNC: 16 MG/DL (ref 7–25)
BUN/CREAT SERPL: NORMAL (CALC) (ref 6–22)
CALCIUM SERPL-MCNC: 9.6 MG/DL (ref 8.6–10.3)
CHLORIDE SERPL-SCNC: 105 MMOL/L (ref 98–110)
CHOLEST SERPL-MCNC: 190 MG/DL
CHOLEST/HDLC SERPL: 3.1 (CALC)
CO2 SERPL-SCNC: 29 MMOL/L (ref 20–32)
CREAT SERPL-MCNC: 1.11 MG/DL (ref 0.7–1.28)
EOSINOPHIL # BLD AUTO: 9 CELLS/UL (ref 15–500)
EOSINOPHIL NFR BLD AUTO: 0.2 %
ERYTHROCYTE [DISTWIDTH] IN BLOOD BY AUTOMATED COUNT: 12.8 % (ref 11–15)
GFR/BSA.PRED SERPLBLD CYS-BASED-ARV: 70 ML/MIN/1.73M2
GLOBULIN SER CALC-MCNC: 2.1 G/DL (CALC) (ref 1.9–3.7)
GLUCOSE SERPL-MCNC: 85 MG/DL (ref 65–99)
HCT VFR BLD AUTO: 40.9 % (ref 38.5–50)
HDLC SERPL-MCNC: 61 MG/DL
HGB BLD-MCNC: 13.7 G/DL (ref 13.2–17.1)
LDLC SERPL CALC-MCNC: 109 MG/DL (CALC)
LYMPHOCYTES # BLD AUTO: 1678 CELLS/UL (ref 850–3900)
LYMPHOCYTES NFR BLD AUTO: 35.7 %
MCH RBC QN AUTO: 30 PG (ref 27–33)
MCHC RBC AUTO-ENTMCNC: 33.5 G/DL (ref 32–36)
MCV RBC AUTO: 89.7 FL (ref 80–100)
MONOCYTES # BLD AUTO: 456 CELLS/UL (ref 200–950)
MONOCYTES NFR BLD AUTO: 9.7 %
NEUTROPHILS # BLD AUTO: 2547 CELLS/UL (ref 1500–7800)
NEUTROPHILS NFR BLD AUTO: 54.2 %
NONHDLC SERPL-MCNC: 129 MG/DL (CALC)
PLATELET # BLD AUTO: 201 THOUSAND/UL (ref 140–400)
PMV BLD REES-ECKER: 10.8 FL (ref 7.5–12.5)
POTASSIUM SERPL-SCNC: 4.5 MMOL/L (ref 3.5–5.3)
PROT SERPL-MCNC: 6.8 G/DL (ref 6.1–8.1)
RBC # BLD AUTO: 4.56 MILLION/UL (ref 4.2–5.8)
SODIUM SERPL-SCNC: 141 MMOL/L (ref 135–146)
TRIGL SERPL-MCNC: 104 MG/DL
WBC # BLD AUTO: 4.7 THOUSAND/UL (ref 3.8–10.8)

## 2023-03-20 NOTE — PROGRESS NOTES
[facility]  coding opportunities       Chart reviewed, no opportunity found:   [address]        Patients Insurance     Medicare Insurance: [facility] Advantage

## 2023-04-19 PROBLEM — M25.531 RIGHT WRIST PAIN: Status: ACTIVE | Noted: 2023-02-20

## 2023-04-19 PROBLEM — G56.02 CARPAL TUNNEL SYNDROME OF LEFT WRIST: Status: ACTIVE | Noted: 2023-03-16

## 2023-06-03 LAB — PSA SERPL-MCNC: 1.07 NG/ML

## 2023-06-15 ENCOUNTER — OFFICE VISIT (OUTPATIENT)
Dept: UROLOGY | Facility: AMBULATORY SURGERY CENTER | Age: 73
End: 2023-06-15
Payer: COMMERCIAL

## 2023-06-15 VITALS
WEIGHT: 180 LBS | OXYGEN SATURATION: 98 % | HEART RATE: 65 BPM | HEIGHT: 70 IN | DIASTOLIC BLOOD PRESSURE: 78 MMHG | SYSTOLIC BLOOD PRESSURE: 128 MMHG | BODY MASS INDEX: 25.77 KG/M2

## 2023-06-15 DIAGNOSIS — C61 PROSTATE CANCER (HCC): Primary | ICD-10-CM

## 2023-06-15 DIAGNOSIS — N52.9 ERECTILE DYSFUNCTION, UNSPECIFIED ERECTILE DYSFUNCTION TYPE: ICD-10-CM

## 2023-06-15 PROCEDURE — 99214 OFFICE O/P EST MOD 30 MIN: CPT | Performed by: UROLOGY

## 2023-06-15 RX ORDER — METHYLPREDNISOLONE ACETATE 40 MG/ML
1 INJECTION, SUSPENSION INTRA-ARTICULAR; INTRALESIONAL; INTRAMUSCULAR; SOFT TISSUE
COMMUNITY

## 2023-06-15 RX ORDER — LIDOCAINE HYDROCHLORIDE 10 MG/ML
1 INJECTION, SOLUTION INFILTRATION; PERINEURAL
COMMUNITY

## 2023-06-15 RX ORDER — TADALAFIL 20 MG/1
20 TABLET ORAL DAILY PRN
Qty: 30 TABLET | Refills: 3 | Status: SHIPPED | OUTPATIENT
Start: 2023-06-15

## 2023-06-15 NOTE — PROGRESS NOTES
6/15/2023    Chen Galdamezira  1950  [id number]        Assessment  History Nhi 7 prostate cancer, status post robot-assisted laparoscopic prostatectomy (2011), history of external beam radiation therapy (2012), detectable PSA 1 07      Discussion  I do lengthy discussion with Joaquina Lewis in the office today regarding his PSA which continues to rise and is now up to 1 07  We again discussed and reviewed the recent PSMA CT PET scan which is negative for any evidence of disease  We discussed repeating a PSMA CT PET scan, however, it may be too soon as his last scan was in December and his PSA has only risen marginally  We discussed short interval follow-up in the next 2 months with a repeat PSA  If the PSA continues to rise we will then proceed with a repeat PSMA CT PET scan  Digital rectal examination will be performed at the next office visit as well  History of Present Illness  67 y o  male with a history of Nhi 7 prostate cancer  In 2011 he underwent a robot-assisted laparoscopic prostatectomy  Post prostatectomy had a PSA recurrence with a PSA as high as 0 7  This was in 2012  He received adjuvant radiation therapy  His PSA has been slowly rising since that time and most recently was 0 78  This prompted a PSMA CT PET scan performed in December 2022 which fortunately showed no evidence of demonstrable disease  He returns in follow-up today  His PSA has now risen up to 1 07         AUA Symptom Score      Review of Systems  Review of Systems   Constitutional: Negative  HENT: Negative  Eyes: Negative  Respiratory: Negative  Cardiovascular: Negative  Gastrointestinal: Negative  Endocrine: Negative  Genitourinary:        Per HPI   Musculoskeletal: Negative  Skin: Negative  Allergic/Immunologic: Negative  Neurological: Negative  Hematological: Negative  Psychiatric/Behavioral: Negative            Past Medical History  Past Medical History:   Diagnosis Date   • Arthritis of hand     Last Assessed: 10/2/2015   • Basal cell carcinoma     Last Assessed: 2017   • Chest pain     Last Assessed: 10/14/2014   • Fall from ladder     Last Assessed: 2017   • Ganglion of left wrist     Last Assessed: 10/3/2016   • Lump of skin     Lsat Assessed: 2/15/2013   • Lyme disease    • Malignant neoplasm of prostate ([facility] [address] )     Last Assessed: 2015   • Neck mass     Last Assessed: 10/3/2016   • Paresthesia of left thumb     Last Assessed: 2017   • Rupture of radial collateral ligament of left thumb     Last Assessed: 2017       Past Social History  Past Surgical History:   Procedure Laterality Date   • CARPAL TUNNEL RELEASE     • PROSTATE SURGERY     • ROTATOR CUFF REPAIR Left    • TOE SURGERY Right     tip of middle toe removed on right foot       Past Family History  Family History   Problem Relation Age of Onset   • Cancer Mother    • Uterine cancer Mother    • Diabetes Father    • Congenital heart disease Sister    • Lung cancer Brother        Past Social history  Social History     Socioeconomic History   • Marital status: /Civil Union     Spouse name: Not on file   • Number of children: Not on file   • Years of education: Not on file   • Highest education level: Not on file   Occupational History   • Not on file   Tobacco Use   • Smoking status: Former     Packs/day: 1 50     Years: 5 00     Total pack years: 7 50     Types: Cigarettes     Quit date: 1970     Years since quittin 4     Passive exposure: Never   • Smokeless tobacco: Never   • Tobacco comments:     quit 1972   Vaping Use   • Vaping Use: Never used   Substance and Sexual Activity   • Alcohol use: Yes     Comment: social   • Drug use: No   • Sexual activity: Not Currently   Other Topics Concern   • Not on file   Social History Narrative   • Not on file     Social Determinants of Health     Financial Resource Strain: Low Risk  (2022)    Overall Financial Resource Strain (CARDIA)    • "Difficulty of Paying Living Expenses: Not very hard   Food Insecurity: Not on file   Transportation Needs: No Transportation Needs (9/26/2022)    PRAPARE - Transportation    • Lack of Transportation (Medical): No    • Lack of Transportation (Non-Medical): No   Physical Activity: Not on file   Stress: Not on file   Social Connections: Not on file   Intimate Partner Violence: Not on file   Housing Stability: Not on file       Current Medications  Current Outpatient Medications   Medication Sig Dispense Refill   • Alpha-Lipoic Acid 100 MG TABS Take by mouth     • Ascorbic Acid (VITAMIN C) 1000 MG tablet Take by mouth     • Cholecalciferol (VITAMIN D3) 5000 units CAPS Take by mouth     • Flax Oil-Fish Oil-Borage Oil (CVS OMEGA-3 PO) Take by mouth     • lidocaine (XYLOCAINE) 1 % 1 mL     • Lycopene 5 MG CAPS Take 1 capsule by mouth daily     • methylPREDNISolone acetate (DEPO-Medrol) 40 mg/mL injection 1 mL     • Misc Natural Products (GLUCOSAMINE CHONDROITIN ADV PO) Take by mouth     • Misc Natural Products (TURMERIC CURCUMIN) CAPS Take by mouth     • Multiple Vitamin (MULTIVITAMIN) capsule Take by mouth     • rosuvastatin (CRESTOR) 5 mg tablet Take 1 tablet (5 mg total) by mouth daily 90 tablet 3   • Selenium 200 MCG CAPS Take by mouth     • vitamin E, tocopherol, 400 units capsule Take by mouth     • Zinc 30 MG CAPS Take by mouth       No current facility-administered medications for this visit  Allergies  Allergies   Allergen Reactions   • Grass Extracts [Gramineae Pollens]        Past Medical History, Social History, Family History, medications and allergies were reviewed  Vitals  Vitals:    06/15/23 1102   BP: 128/78   BP Location: Left arm   Patient Position: Sitting   Cuff Size: Adult   Pulse: 65   SpO2: 98%   Weight: 81 6 kg (180 lb)   Height: 5' 10\" (1 778 m)       Physical Exam  Physical Exam    On examination he is in no acute distress    Gait normal   Affect normal  Results  Lab Results   Component " Value Date    PSA 1 07 06/02/2023    PSA 0 78 09/19/2022    PSA 0 77 03/14/2022     Lab Results   Component Value Date    BUN 16 03/20/2023    CALCIUM 9 6 03/20/2023     03/20/2023    CO2 29 03/20/2023    CREATININE 1 11 03/20/2023    GLUCOSE 80 10/03/2014    K 4 5 03/20/2023     10/03/2014     Lab Results   Component Value Date    HCT 40 9 03/20/2023    HGB 13 7 03/20/2023    MCV 89 7 03/20/2023     03/20/2023    WBC 4 7 03/20/2023         Office Urine Dip  No results found for this or any previous visit (from the past 1 hour(s)) ]

## 2023-08-22 LAB — PSA SERPL-MCNC: 0.93 NG/ML

## 2023-08-29 ENCOUNTER — OFFICE VISIT (OUTPATIENT)
Dept: UROLOGY | Facility: AMBULATORY SURGERY CENTER | Age: 73
End: 2023-08-29
Payer: COMMERCIAL

## 2023-08-29 VITALS
SYSTOLIC BLOOD PRESSURE: 126 MMHG | BODY MASS INDEX: 25.48 KG/M2 | OXYGEN SATURATION: 99 % | HEIGHT: 70 IN | WEIGHT: 178 LBS | RESPIRATION RATE: 18 BRPM | DIASTOLIC BLOOD PRESSURE: 80 MMHG | HEART RATE: 67 BPM

## 2023-08-29 DIAGNOSIS — R35.1 NOCTURIA: ICD-10-CM

## 2023-08-29 DIAGNOSIS — C61 PROSTATE CANCER (HCC): Primary | ICD-10-CM

## 2023-08-29 PROCEDURE — 99214 OFFICE O/P EST MOD 30 MIN: CPT | Performed by: UROLOGY

## 2023-08-29 NOTE — PROGRESS NOTES
8/29/2023    Katerine Villa  1950  [id number]        Assessment    79-year-old male status post RALP for G7 CaP in 2011. Required adjuvant radiation. PSMA CT PET December 2022 showed ALEXEI. Most recent PSA was 0.93 on 8/21/23. Patient also admitted to nocturia about twice per night. He did state that he drinks a glass or 2 of water and either a cup of coffee or a cup of tea before bed. He denied any other significant voiding issues. Discussion    Regarding his prostate cancer, his most recent PSA of 0.93 on 8/21/2023 is slightly down from his PSA of 1.07 on 6/2/2023. I explained that we can continue monitoring his PSA every 6 months or so to check for a trend. He was in agreement with this plan. Regarding the patient's nocturia, I explained that first-line treatment can often be behavioral modifications. I explained that drinking multiple glasses of water and drinking coffee or tea before bed can certainly cause increased nighttime urination. I recommended that he try to limit his fluids after dinnertime. In addition, I explained that coffee, tea, and other caffeinated beverages can be bladder irritants and caused increased voiding. I asked him to cut back on these beverages as well, particularly before bedtime. Provided Torres Cooler with reassurance that digital rectal examination in the office today reveals an absent rectal fossa without palpable mass or lesion. PLAN  -F/u in 6 months with PSA  -Will trial cutting back on fluids after dinner        History of Present Illness  75 y.o. male with a history of Neffs 7 prostate cancer. In 2011 he underwent a robot-assisted laparoscopic prostatectomy. Post prostatectomy had a PSA as high as 0.7. He received adjuvant radiation therapy. His PSA has been slowly rising since that time. In December 2022 he had a PSMA CT PET scan which fortunately showed no evidence of disease.   His most recent PSA level is 0.93 down from 1.07 previous. Denied gross hematuria. Overall happy with voiding, however, over the past few months he has noted nocturia x2. He does admit to drinking coffee or tea and glass of water or so after dinner. AUA Symptom Score  AUA SYMPTOM SCORE    Flowsheet Row Most Recent Value   AUA SYMPTOM SCORE    How often have you had a sensation of not emptying your bladder completely after you finished urinating? 0 (P)     How often have you had to urinate again less than two hours after you finished urinating? 0 (P)     How often have you found you stopped and started again several times when you urinate? 0 (P)     How often have you found it difficult to postpone urination? 1 (P)     How often have you had a weak urinary stream? 0 (P)     How often have you had to push or strain to begin urination? 0 (P)     How many times did you most typically get up to urinate from the time you went to bed at night until the time you got up in the morning? 4 (P)     Quality of Life: If you were to spend the rest of your life with your urinary condition just the way it is now, how would you feel about that? 0 (P)     AUA SYMPTOM SCORE 5 (P)           Review of Systems  Review of Systems   Constitutional: Negative for chills and fever. HENT: Negative for congestion and hearing loss. Respiratory: Negative for cough and shortness of breath. Gastrointestinal: Negative for nausea and vomiting. Genitourinary: Negative for difficulty urinating and dysuria. Neurological: Negative for dizziness and headaches. Psychiatric/Behavioral: Negative for agitation and confusion.          Past Medical History  Past Medical History:   Diagnosis Date   • Arthritis of hand     Last Assessed: 10/2/2015   • Basal cell carcinoma 2014    Last Assessed: 12/6/2017   • Chest pain     Last Assessed: 10/14/2014   • Fall from ladder     Last Assessed: 1/13/2017   • Ganglion of left wrist     Last Assessed: 10/3/2016   • Lump of skin     Lsat Assessed: 2/15/2013   • Lyme disease    • Malignant neoplasm of prostate ([address])     Last Assessed: 2015   • Neck mass     Last Assessed: 10/3/2016   • Paresthesia of left thumb     Last Assessed: 2017   • Rupture of radial collateral ligament of left thumb     Last Assessed: 2017       Past Social History  Past Surgical History:   Procedure Laterality Date   • CARPAL TUNNEL RELEASE     • PROSTATE SURGERY     • ROTATOR CUFF REPAIR Left    • TOE SURGERY Right     tip of middle toe removed on right foot       Past Family History  Family History   Problem Relation Age of Onset   • Cancer Mother    • Uterine cancer Mother    • Diabetes Father    • Congenital heart disease Sister    • Lung cancer Brother        Past Social history  Social History     Socioeconomic History   • Marital status: /Civil Union     Spouse name: Not on file   • Number of children: Not on file   • Years of education: Not on file   • Highest education level: Not on file   Occupational History   • Not on file   Tobacco Use   • Smoking status: Former     Packs/day: 1.50     Years: 5.00     Total pack years: 7.50     Types: Cigarettes     Quit date: 1970     Years since quittin.6     Passive exposure: Never   • Smokeless tobacco: Never   • Tobacco comments:     quit 1972   Vaping Use   • Vaping Use: Never used   Substance and Sexual Activity   • Alcohol use: Yes     Comment: social   • Drug use: No   • Sexual activity: Not Currently   Other Topics Concern   • Not on file   Social History Narrative   • Not on file     Social Determinants of Health     Financial Resource Strain: Low Risk  (2022)    Overall Financial Resource Strain (CARDIA)    • Difficulty of Paying Living Expenses: Not very hard   Food Insecurity: Not on file   Transportation Needs: No Transportation Needs (2022)    PRAPARE - Transportation    • Lack of Transportation (Medical): No    • Lack of Transportation (Non-Medical):  No   Physical Activity: Not on file   Stress: Not on file   Social Connections: Not on file   Intimate Partner Violence: Not on file   Housing Stability: Not on file       Current Medications  Current Outpatient Medications   Medication Sig Dispense Refill   • Alpha-Lipoic Acid 100 MG TABS Take by mouth     • Ascorbic Acid (VITAMIN C) 1000 MG tablet Take by mouth     • Cholecalciferol (VITAMIN D3) 5000 units CAPS Take by mouth     • Flax Oil-Fish Oil-Borage Oil ([employer] OMEGA-3 PO) Take by mouth     • lidocaine (XYLOCAINE) 1 % 1 mL     • Lycopene 5 MG CAPS Take 1 capsule by mouth daily     • methylPREDNISolone acetate (DEPO-Medrol) 40 mg/mL injection 1 mL     • Misc Natural Products (GLUCOSAMINE CHONDROITIN ADV PO) Take by mouth     • Misc Natural Products (TURMERIC CURCUMIN) CAPS Take by mouth     • Multiple Vitamin (MULTIVITAMIN) capsule Take by mouth     • rosuvastatin (CRESTOR) 5 mg tablet Take 1 tablet (5 mg total) by mouth daily 90 tablet 3   • Selenium 200 MCG CAPS Take by mouth     • tadalafil (CIALIS) 20 MG tablet Take 1 tablet (20 mg total) by mouth daily as needed for erectile dysfunction 30 tablet 3   • vitamin E, tocopherol, 400 units capsule Take by mouth     • Zinc 30 MG CAPS Take by mouth       No current facility-administered medications for this visit. Allergies  Allergies   Allergen Reactions   • Grass Extracts [Gramineae Pollens]        Past Medical History, Social History, Family History, medications and allergies were reviewed. Vitals  Vitals:    08/29/23 1559   Resp: 18   Weight: 80.7 kg (178 lb)   Height: 5' 10" (1.778 m)       Physical Exam  Physical Exam  On examination he is in no acute distress. His abdomen is soft nontender nondistended. Robot-assisted laparoscopic possible scars are healed. Digital rectal examination reveals the prostate is absent. There is no palpable tissue.   Gait normal.  Affect normal      Results  Lab Results   Component Value Date    PSA 0.93 08/21/2023    PSA 1.07 06/02/2023 PSA 0.78 09/19/2022     Lab Results   Component Value Date    GLUCOSE 80 10/03/2014    CALCIUM 9.6 03/20/2023     10/03/2014    K 4.5 03/20/2023    CO2 29 03/20/2023     03/20/2023    BUN 16 03/20/2023    CREATININE 1.11 03/20/2023     Lab Results   Component Value Date    WBC 4.7 03/20/2023    HGB 13.7 03/20/2023    HCT 40.9 03/20/2023    MCV 89.7 03/20/2023     03/20/2023         Office Urine Dip  No results found for this or any previous visit (from the past 1 hour(s)).]

## 2023-10-17 LAB
ALBUMIN SERPL-MCNC: 5 G/DL (ref 3.6–5.1)
ALBUMIN/GLOB SERPL: 2.3 (CALC) (ref 1–2.5)
ALP SERPL-CCNC: 57 U/L (ref 35–144)
ALT SERPL-CCNC: 20 U/L (ref 9–46)
AST SERPL-CCNC: 18 U/L (ref 10–35)
BASOPHILS # BLD AUTO: 12 CELLS/UL (ref 0–200)
BASOPHILS NFR BLD AUTO: 0.2 %
BILIRUB SERPL-MCNC: 0.8 MG/DL (ref 0.2–1.2)
BUN SERPL-MCNC: 18 MG/DL (ref 7–25)
BUN/CREAT SERPL: NORMAL (CALC) (ref 6–22)
CALCIUM SERPL-MCNC: 9.7 MG/DL (ref 8.6–10.3)
CHLORIDE SERPL-SCNC: 105 MMOL/L (ref 98–110)
CHOLEST SERPL-MCNC: 171 MG/DL
CHOLEST/HDLC SERPL: 2.6 (CALC)
CO2 SERPL-SCNC: 27 MMOL/L (ref 20–32)
CREAT SERPL-MCNC: 0.88 MG/DL (ref 0.7–1.28)
EOSINOPHIL # BLD AUTO: 0 CELLS/UL (ref 15–500)
EOSINOPHIL NFR BLD AUTO: 0 %
ERYTHROCYTE [DISTWIDTH] IN BLOOD BY AUTOMATED COUNT: 13 % (ref 11–15)
GFR/BSA.PRED SERPLBLD CYS-BASED-ARV: 90 ML/MIN/1.73M2
GLOBULIN SER CALC-MCNC: 2.2 G/DL (CALC) (ref 1.9–3.7)
GLUCOSE SERPL-MCNC: 88 MG/DL (ref 65–99)
HCT VFR BLD AUTO: 39.8 % (ref 38.5–50)
HDLC SERPL-MCNC: 65 MG/DL
HGB BLD-MCNC: 13.4 G/DL (ref 13.2–17.1)
LDLC SERPL CALC-MCNC: 90 MG/DL (CALC)
LYMPHOCYTES # BLD AUTO: 1519 CELLS/UL (ref 850–3900)
LYMPHOCYTES NFR BLD AUTO: 24.5 %
MCH RBC QN AUTO: 30.3 PG (ref 27–33)
MCHC RBC AUTO-ENTMCNC: 33.7 G/DL (ref 32–36)
MCV RBC AUTO: 90 FL (ref 80–100)
MONOCYTES # BLD AUTO: 738 CELLS/UL (ref 200–950)
MONOCYTES NFR BLD AUTO: 11.9 %
NEUTROPHILS # BLD AUTO: 3931 CELLS/UL (ref 1500–7800)
NEUTROPHILS NFR BLD AUTO: 63.4 %
NONHDLC SERPL-MCNC: 106 MG/DL (CALC)
PLATELET # BLD AUTO: 206 THOUSAND/UL (ref 140–400)
PMV BLD REES-ECKER: 11 FL (ref 7.5–12.5)
POTASSIUM SERPL-SCNC: 4.1 MMOL/L (ref 3.5–5.3)
PROT SERPL-MCNC: 7.2 G/DL (ref 6.1–8.1)
RBC # BLD AUTO: 4.42 MILLION/UL (ref 4.2–5.8)
SODIUM SERPL-SCNC: 140 MMOL/L (ref 135–146)
TRIGL SERPL-MCNC: 70 MG/DL
WBC # BLD AUTO: 6.2 THOUSAND/UL (ref 3.8–10.8)

## 2023-10-23 ENCOUNTER — OFFICE VISIT (OUTPATIENT)
Dept: FAMILY MEDICINE CLINIC | Facility: CLINIC | Age: 73
End: 2023-10-23
Payer: COMMERCIAL

## 2023-10-23 VITALS
OXYGEN SATURATION: 97 % | SYSTOLIC BLOOD PRESSURE: 118 MMHG | HEIGHT: 70 IN | DIASTOLIC BLOOD PRESSURE: 58 MMHG | WEIGHT: 177.8 LBS | HEART RATE: 57 BPM | TEMPERATURE: 97.8 F | BODY MASS INDEX: 25.45 KG/M2

## 2023-10-23 DIAGNOSIS — Z85.46 HISTORY OF PROSTATE CANCER: ICD-10-CM

## 2023-10-23 DIAGNOSIS — E78.5 BORDERLINE HYPERLIPIDEMIA: ICD-10-CM

## 2023-10-23 DIAGNOSIS — M77.8 RIGHT WRIST TENDONITIS: ICD-10-CM

## 2023-10-23 DIAGNOSIS — Z00.00 MEDICARE ANNUAL WELLNESS VISIT, SUBSEQUENT: Primary | ICD-10-CM

## 2023-10-23 PROCEDURE — G0439 PPPS, SUBSEQ VISIT: HCPCS | Performed by: FAMILY MEDICINE

## 2023-10-23 PROCEDURE — 99214 OFFICE O/P EST MOD 30 MIN: CPT | Performed by: FAMILY MEDICINE

## 2023-10-23 NOTE — PATIENT INSTRUCTIONS
Medicare Preventive Visit Patient Instructions  Thank you for completing your Welcome to Medicare Visit or Medicare Annual Wellness Visit today. Your next wellness visit will be due in one year (10/23/2024). The screening/preventive services that you may require over the next 5-10 years are detailed below. Some tests may not apply to you based off risk factors and/or age. Screening tests ordered at today's visit but not completed yet may show as past due. Also, please note that scanned in results may not display below. Preventive Screenings:  Service Recommendations Previous Testing/Comments   Colorectal Cancer Screening  Colonoscopy    Fecal Occult Blood Test (FOBT)/Fecal Immunochemical Test (FIT)  Fecal DNA/Cologuard Test  Flexible Sigmoidoscopy Age: 43-73 years old   Colonoscopy: every 10 years (May be performed more frequently if at higher risk)  OR  FOBT/FIT: every 1 year  OR  Cologuard: every 3 years  OR  Sigmoidoscopy: every 5 years  Screening may be recommended earlier than age 39 if at higher risk for colorectal cancer. Also, an individualized decision between you and your healthcare provider will decide whether screening between the ages of 77-80 would be appropriate.  Colonoscopy: 10/06/2022  FOBT/FIT: Not on file  Cologuard: Not on file  Sigmoidoscopy: Not on file    Screening Current     Prostate Cancer Screening Individualized decision between patient and health care provider in men between ages of 53-66   Medicare will cover every 12 months beginning on the day after your 50th birthday PSA: 0.93 ng/mL     History Prostate Cancer     Hepatitis C Screening Once for adults born between 1945 and 1965  More frequently in patients at high risk for Hepatitis C Hep C Antibody: 08/21/2020    Screening Current   Diabetes Screening 1-2 times per year if you're at risk for diabetes or have pre-diabetes Fasting glucose: 85 mg/dL (3/8/2021)  A1C: No results in last 5 years (No results in last 5 years)  Screening Current   Cholesterol Screening Once every 5 years if you don't have a lipid disorder. May order more often based on risk factors. Lipid panel: 10/16/2023  Screening Not Indicated  History Lipid Disorder      Other Preventive Screenings Covered by Medicare:  Abdominal Aortic Aneurysm (AAA) Screening: covered once if your at risk. You're considered to be at risk if you have a family history of AAA or a male between the age of 70-76 who smoking at least 100 cigarettes in your lifetime. Lung Cancer Screening: covers low dose CT scan once per year if you meet all of the following conditions: (1) Age 48-67; (2) No signs or symptoms of lung cancer; (3) Current smoker or have quit smoking within the last 15 years; (4) You have a tobacco smoking history of at least 20 pack years (packs per day x number of years you smoked); (5) You get a written order from a healthcare provider. Glaucoma Screening: covered annually if you're considered high risk: (1) You have diabetes OR (2) Family history of glaucoma OR (3)  aged 48 and older OR (3)  American aged 72 and older  Osteoporosis Screening: covered every 2 years if you meet one of the following conditions: (1) Have a vertebral abnormality; (2) On glucocorticoid therapy for more than 3 months; (3) Have primary hyperparathyroidism; (4) On osteoporosis medications and need to assess response to drug therapy. HIV Screening: covered annually if you're between the age of 14-79. Also covered annually if you are younger than 13 and older than 72 with risk factors for HIV infection. For pregnant patients, it is covered up to 3 times per pregnancy.     Immunizations:  Immunization Recommendations   Influenza Vaccine Annual influenza vaccination during flu season is recommended for all persons aged >= 6 months who do not have contraindications   Pneumococcal Vaccine   * Pneumococcal conjugate vaccine = PCV13 (Prevnar 13), PCV15 (Vaxneuvance), PCV20 (Prevnar 20)  * Pneumococcal polysaccharide vaccine = PPSV23 (Pneumovax) Adults 51-67 yo with certain risk factors or if 69+ yo  If never received any pneumonia vaccine: recommend Prevnar 20 (PCV20)  Give PCV20 if previously received 1 dose of PCV13 or PPSV23   Hepatitis B Vaccine 3 dose series if at intermediate or high risk (ex: diabetes, end stage renal disease, liver disease)   Respiratory syncytial virus (RSV) Vaccine - COVERED BY MEDICARE PART D  * RSVPreF3 (Arexvy) CDC recommends that adults 61years of age and older may receive a single dose of RSV vaccine using shared clinical decision-making (SCDM)   Tetanus (Td) Vaccine - COST NOT COVERED BY MEDICARE PART B Following completion of primary series, a booster dose should be given every 10 years to maintain immunity against tetanus. Td may also be given as tetanus wound prophylaxis. Tdap Vaccine - COST NOT COVERED BY MEDICARE PART B Recommended at least once for all adults. For pregnant patients, recommended with each pregnancy. Shingles Vaccine (Shingrix) - COST NOT COVERED BY MEDICARE PART B  2 shot series recommended in those 19 years and older who have or will have weakened immune systems or those 50 years and older     Health Maintenance Due:      Topic Date Due   • Colorectal Cancer Screening  10/06/2025   • Hepatitis C Screening  Completed     Immunizations Due:      Topic Date Due   • Pneumococcal Vaccine: 65+ Years (2 - PCV) 08/18/2016   • COVID-19 Vaccine (3 - Pfizer series) 06/09/2021   • Influenza Vaccine (1) Never done     Advance Directives   What are advance directives? Advance directives are legal documents that state your wishes and plans for medical care. These plans are made ahead of time in case you lose your ability to make decisions for yourself. Advance directives can apply to any medical decision, such as the treatments you want, and if you want to donate organs. What are the types of advance directives?   There are many types of advance directives, and each state has rules about how to use them. You may choose a combination of any of the following:  Living will: This is a written record of the treatment you want. You can also choose which treatments you do not want, which to limit, and which to stop at a certain time. This includes surgery, medicine, IV fluid, and tube feedings. Durable power of  for healthcare [facility]): This is a written record that states who you want to make healthcare choices for you when you are unable to make them for yourself. This person, called a proxy, is usually a family member or a friend. You may choose more than 1 proxy. Do not resuscitate (DNR) order:  A DNR order is used in case your heart stops beating or you stop breathing. It is a request not to have certain forms of treatment, such as CPR. A DNR order may be included in other types of advance directives. Medical directive: This covers the care that you want if you are in a coma, near death, or unable to make decisions for yourself. You can list the treatments you want for each condition. Treatment may include pain medicine, surgery, blood transfusions, dialysis, IV or tube feedings, and a ventilator (breathing machine). Values history: This document has questions about your views, beliefs, and how you feel and think about life. This information can help others choose the care that you would choose. Why are advance directives important? An advance directive helps you control your care. Although spoken wishes may be used, it is better to have your wishes written down. Spoken wishes can be misunderstood, or not followed. Treatments may be given even if you do not want them. An advance directive may make it easier for your family to make difficult choices about your care.    Weight Management   Why it is important to manage your weight:  Being overweight increases your risk of health conditions such as heart disease, high blood pressure, type 2 diabetes, and certain types of cancer. It can also increase your risk for osteoarthritis, sleep apnea, and other respiratory problems. Aim for a slow, steady weight loss. Even a small amount of weight loss can lower your risk of health problems. How to lose weight safely:  A safe and healthy way to lose weight is to eat fewer calories and get regular exercise. You can lose up about 1 pound a week by decreasing the number of calories you eat by 500 calories each day. Healthy meal plan for weight management:  A healthy meal plan includes a variety of foods, contains fewer calories, and helps you stay healthy. A healthy meal plan includes the following:  Eat whole-grain foods more often. A healthy meal plan should contain fiber. Fiber is the part of grains, fruits, and vegetables that is not broken down by your body. Whole-grain foods are healthy and provide extra fiber in your diet. Some examples of whole-grain foods are whole-wheat breads and pastas, oatmeal, brown rice, and bulgur. Eat a variety of vegetables every day. Include dark, leafy greens such as spinach, kale, ángel greens, and mustard greens. Eat yellow and orange vegetables such as carrots, sweet potatoes, and winter squash. Eat a variety of fruits every day. Choose fresh or canned fruit (canned in its own juice or light syrup) instead of juice. Fruit juice has very little or no fiber. Eat low-fat dairy foods. Drink fat-free (skim) milk or 1% milk. Eat fat-free yogurt and low-fat cottage cheese. Try low-fat cheeses such as mozzarella and other reduced-fat cheeses. Choose meat and other protein foods that are low in fat. Choose beans or other legumes such as split peas or lentils. Choose fish, skinless poultry (chicken or turkey), or lean cuts of red meat (beef or pork). Before you cook meat or poultry, cut off any visible fat. Use less fat and oil. Try baking foods instead of frying them.  Add less fat, such as margarine, sour cream, regular salad dressing and mayonnaise to foods. Eat fewer high-fat foods. Some examples of high-fat foods include french fries, doughnuts, ice cream, and cakes. Eat fewer sweets. Limit foods and drinks that are high in sugar. This includes candy, cookies, regular soda, and sweetened drinks. Exercise:  Exercise at least 30 minutes per day on most days of the week. Some examples of exercise include walking, biking, dancing, and swimming. You can also fit in more physical activity by taking the stairs instead of the elevator or parking farther away from stores. Ask your healthcare provider about the best exercise plan for you. © Copyright [employer] 2018 Information is for End User's use only and may not be sold, redistributed or otherwise used for commercial purposes.  All illustrations and images included in CareNotes® are the copyrighted property of [employer] or [address] [place] [address]

## 2023-10-23 NOTE — PROGRESS NOTES
Assessment and Plan:     Problem List Items Addressed This Visit          Other    Borderline hyperlipidemia     Other Visit Diagnoses       Medicare annual wellness visit, subsequent    -  Primary    Right wrist tendonitis        History of prostate cancer                Depression Screening and Follow-up Plan: Patient was screened for depression during today's encounter. They screened negative with a PHQ-2 score of 0. Preventive health issues were discussed with patient, and age appropriate screening tests were ordered as noted in patient's After Visit Summary. Personalized health advice and appropriate referrals for health education or preventive services given if needed, as noted in patient's After Visit Summary. History of Present Illness:     Patient presents for a Medicare Wellness Visit    Here for f/u. Healing well from carpal tunnel surgery in July with Dr. Moshe Garcia. No OT post op was needed. R handed. R hand was done years ago. Also following with him for R wrist tendonitis, cartilage is just about gone. BP controlled. Follows with Dr. Phong Stafford for prostate CA hx. Watching PSA. Tolerating crestor, taking CoQ10, lipids stable. Kidney and liver stable. Feeling well. Declines flu shot. Patient Care Team:  Carmelo Hunter MD as PCP - General  ANASTACIA Rock MD Zoila Mcbride, MD Elroy Celeste, MD     Review of Systems:     Review of Systems   Constitutional:  Negative for fatigue and fever. HENT:  Negative for congestion. Eyes:  Negative for visual disturbance. Respiratory:  Negative for chest tightness and shortness of breath. Cardiovascular:  Negative for chest pain and palpitations. Gastrointestinal:  Negative for abdominal pain. Genitourinary:  Negative for difficulty urinating. Musculoskeletal:  Positive for arthralgias. Neurological:  Negative for headaches. Hematological:  Does not bruise/bleed easily.         Problem List: Patient Active Problem List   Diagnosis    Borderline hyperlipidemia    Soft tissue swelling of chest wall    Left shoulder pain    Skin cancer of forehead    Cancer of skin of external nose    History of colonic polyps    Allergic rhinitis    Left arm numbness    Arthritis of big toe    Carpal tunnel syndrome of left wrist    Right wrist pain    Rupture of radial collateral ligament of left thumb      Past Medical and Surgical History:     Past Medical History:   Diagnosis Date    Arthritis of hand     Last Assessed: 10/2/2015    Basal cell carcinoma 2014    Last Assessed: 12/6/2017    Carcinoma of prostate ([address]) 10/08/2015    Chest pain     Last Assessed: 10/14/2014    Fall from ladder     Last Assessed: 1/13/2017    Ganglion of left wrist     Last Assessed: 10/3/2016    Lump of skin     Lsat Assessed: 2/15/2013    Lyme disease     Malignant neoplasm of prostate ([address]) 2011    Last Assessed: 4/1/2015    Malignant tumor of prostate ([address]) 04/01/2011    Neck mass     Last Assessed: 10/3/2016    Paresthesia of left thumb     Last Assessed: 2/7/2017    Rupture of radial collateral ligament of left thumb     Last Assessed: 2/7/2017     Past Surgical History:   Procedure Laterality Date    CARPAL TUNNEL RELEASE      PROSTATE SURGERY      ROTATOR CUFF REPAIR Left     TOE SURGERY Right     tip of middle toe removed on right foot      Family History:     Family History   Problem Relation Age of Onset    Cancer Mother     Uterine cancer Mother     Diabetes Father     Congenital heart disease Sister     Lung cancer Brother       Social History:     Social History     Socioeconomic History    Marital status: /Civil Union     Spouse name: None    Number of children: None    Years of education: None    Highest education level: None   Occupational History    None   Tobacco Use    Smoking status: Former     Packs/day: 1.50     Years: 5.00     Total pack years: 7.50     Types: Cigarettes     Quit date: 1970     Years since quittin.8     Passive exposure: Never    Smokeless tobacco: Never    Tobacco comments:     quit 1972   Vaping Use    Vaping Use: Never used   Substance and Sexual Activity    Alcohol use: Yes     Comment: social    Drug use: No    Sexual activity: Not Currently   Other Topics Concern    None   Social History Narrative    None     Social Determinants of Health     Financial Resource Strain: Low Risk  (10/23/2023)    Overall Financial Resource Strain (CARDIA)     Difficulty of Paying Living Expenses: Not very hard   Food Insecurity: Not on file   Transportation Needs: No Transportation Needs (10/23/2023)    PRAPARE - Transportation     Lack of Transportation (Medical): No     Lack of Transportation (Non-Medical):  No   Physical Activity: Not on file   Stress: Not on file   Social Connections: Not on file   Intimate Partner Violence: Not on file   Housing Stability: Not on file      Medications and Allergies:     Current Outpatient Medications   Medication Sig Dispense Refill    Alpha-Lipoic Acid 100 MG TABS Take by mouth      Ascorbic Acid (VITAMIN C) 1000 MG tablet Take by mouth      Cholecalciferol (VITAMIN D3) 5000 units CAPS Take by mouth      Flax Oil-Fish Oil-Borage Oil ([employer] OMEGA-3 PO) Take by mouth      lidocaine (XYLOCAINE) 1 % 1 mL      Lycopene 5 MG CAPS Take 1 capsule by mouth daily      Misc Natural Products (GLUCOSAMINE CHONDROITIN ADV PO) Take by mouth      Misc Natural Products (TURMERIC CURCUMIN) CAPS Take by mouth      Multiple Vitamin (MULTIVITAMIN) capsule Take by mouth      rosuvastatin (CRESTOR) 5 mg tablet Take 1 tablet (5 mg total) by mouth daily 90 tablet 3    Selenium 200 MCG CAPS Take by mouth      vitamin E, tocopherol, 400 units capsule Take by mouth      Zinc 30 MG CAPS Take by mouth      methylPREDNISolone acetate (DEPO-Medrol) 40 mg/mL injection 1 mL (Patient not taking: Reported on 10/23/2023)      tadalafil (CIALIS) 20 MG tablet Take 1 tablet (20 mg total) by mouth daily as needed for erectile dysfunction (Patient not taking: Reported on 10/23/2023) 30 tablet 3     No current facility-administered medications for this visit. Allergies   Allergen Reactions    Grass Extracts [Gramineae Pollens]       Immunizations:     Immunization History   Administered Date(s) Administered    COVID-19 PFIZER VACCINE 0.3 ML IM 03/23/2021, 04/14/2021    Pneumococcal Polysaccharide PPV23 08/18/2015      Health Maintenance:         Topic Date Due    Colorectal Cancer Screening  10/06/2025    Hepatitis C Screening  Completed         Topic Date Due    Pneumococcal Vaccine: 65+ Years (2 - PCV) 08/18/2016    COVID-19 Vaccine (3 - Pfizer series) 06/09/2021    Influenza Vaccine (1) Never done      Medicare Screening Tests and Risk Assessments:     Kari Fitzpatrick is here for his Subsequent Wellness visit. Last Medicare Wellness visit information reviewed, patient interviewed and updates made to the record today. Health Risk Assessment:   Patient rates overall health as excellent. Patient feels that their physical health rating is same. Patient is very satisfied with their life. Eyesight was rated as slightly worse. Hearing was rated as same. Patient feels that their emotional and mental health rating is same. Patients states they are never, rarely angry. Patient states they are never, rarely unusually tired/fatigued. Pain experienced in the last 7 days has been none. Patient states that he has experienced no weight loss or gain in last 6 months. Depression Screening:   PHQ-2 Score: 0      Fall Risk Screening: In the past year, patient has experienced: no history of falling in past year      Home Safety:  Patient does not have trouble with stairs inside or outside of their home. Patient has working smoke alarms and has working carbon monoxide detector. Home safety hazards include: none. Nutrition:   Current diet is Regular. Medications:   Patient is currently taking over-the-counter supplements. OTC medications include: see medication list. Patient is able to manage medications. Activities of Daily Living (ADLs)/Instrumental Activities of Daily Living (IADLs):   Walk and transfer into and out of bed and chair?: Yes  Dress and groom yourself?: Yes    Bathe or shower yourself?: Yes    Feed yourself? Yes  Do your laundry/housekeeping?: Yes  Manage your money, pay your bills and track your expenses?: Yes  Make your own meals?: Yes    Do your own shopping?: Yes    Previous Hospitalizations:   Any hospitalizations or ED visits within the last 12 months?: No      Advance Care Planning:   Living will: No    Advanced directive: No      Cognitive Screening:   Provider or family/friend/caregiver concerned regarding cognition?: No    PREVENTIVE SCREENINGS      Cardiovascular Screening:    General: Screening Not Indicated and History Lipid Disorder      Diabetes Screening:     General: Screening Current      Colorectal Cancer Screening:     General: Screening Current      Prostate Cancer Screening:    General: History Prostate Cancer      Osteoporosis Screening:    General: Screening Not Indicated      Abdominal Aortic Aneurysm (AAA) Screening:    Risk factors include: age between 70-77 yo and tobacco use        General: Screening Current      Lung Cancer Screening:     General: Screening Not Indicated      Hepatitis C Screening:    General: Screening Current    Screening, Brief Intervention, and Referral to Treatment (SBIRT)    Screening      Single Item Drug Screening:  How often have you used an illegal drug (including marijuana) or a prescription medication for non-medical reasons in the past year? never    Single Item Drug Screen Score: 0  Interpretation: Negative screen for possible drug use disorder    No results found.      Physical Exam:     /58 (BP Location: Left arm, Patient Position: Sitting, Cuff Size: Large)   Pulse 57   Temp 97.8 °F (36.6 °C) (Temporal)   Ht 5' 10" (1.778 m)   Wt 80.6 kg (177 lb 12.8 oz)   SpO2 97%   BMI 25.51 kg/m²     Physical Exam  Vitals reviewed. Constitutional:       Appearance: Normal appearance. Cardiovascular:      Rate and Rhythm: Normal rate and regular rhythm. Heart sounds: Normal heart sounds. Pulmonary:      Effort: Pulmonary effort is normal.      Breath sounds: Normal breath sounds. Musculoskeletal:      Right lower leg: No edema. Left lower leg: No edema. Neurological:      General: No focal deficit present. Mental Status: He is alert and oriented to person, place, and time. Psychiatric:         Mood and Affect: Mood normal.         Behavior: Behavior normal.         Thought Content:  Thought content normal.         Judgment: Judgment normal.          Lynett Duane, MD

## 2024-02-24 LAB — PSA SERPL-MCNC: 1.07 NG/ML

## 2024-03-05 ENCOUNTER — OFFICE VISIT (OUTPATIENT)
Dept: UROLOGY | Facility: CLINIC | Age: 74
End: 2024-03-05
Payer: COMMERCIAL

## 2024-03-05 VITALS
OXYGEN SATURATION: 98 % | HEART RATE: 69 BPM | SYSTOLIC BLOOD PRESSURE: 130 MMHG | HEIGHT: 70 IN | WEIGHT: 182 LBS | TEMPERATURE: 97.3 F | DIASTOLIC BLOOD PRESSURE: 80 MMHG | BODY MASS INDEX: 26.05 KG/M2

## 2024-03-05 DIAGNOSIS — C61 PROSTATE CANCER (HCC): Primary | ICD-10-CM

## 2024-03-05 PROCEDURE — 99213 OFFICE O/P EST LOW 20 MIN: CPT | Performed by: PHYSICIAN ASSISTANT

## 2024-03-05 NOTE — PROGRESS NOTES
3/5/2024      Chief Complaint   Patient presents with    Follow-up     PSA     Assessment and Plan    Nhi 7 prostate cancer s/p RALP in 2011 s/p adjuvant radiation therapy in 2012 for post-op PSA of 0.7  Rising PSA  - PSMA PET CT in December 2022  negative   - PSA from 2/23/24 was 1.07, overall stable from prior. Last PSA from 8/21/23 was 0.93  - Recheck PSA in 2-3 months. If continued or progressive rise, recommend repeat PSMA PET CT     History of Present Illness  Morro Villa is a 73 y.o. male here for follow up evaluation of prostate cancer.     In 2011 he underwent a robot-assisted laparoscopic prostatectomy. Post prostatectomy had a PSA as high as 0.7. He received adjuvant radiation therapy in 2012. His PSA has been slowly rising since that time. In December 2022 he had a PSMA CT PET scan which fortunately showed no evidence of disease. His most recent PSA was 1.07, slightly increased from prior at 0.93. He denies any new or bothersome urinary issues.     Review of Systems   Constitutional:  Negative for chills and fever.   Respiratory:  Negative for shortness of breath.    Cardiovascular:  Negative for chest pain.   Gastrointestinal:  Negative for abdominal pain.   Genitourinary:  Positive for frequency. Negative for difficulty urinating, dysuria, flank pain, hematuria and urgency.   Neurological:  Negative for dizziness.              Past Medical History  Past Medical History:   Diagnosis Date    Arthritis of hand     Last Assessed: 10/2/2015    Basal cell carcinoma 2014    Last Assessed: 12/6/2017    Carcinoma of prostate ([facility]) 10/08/2015    Chest pain     Last Assessed: 10/14/2014    Fall from ladder     Last Assessed: 1/13/2017    Ganglion of left wrist     Last Assessed: 10/3/2016    Lump of skin     Lsat Assessed: 2/15/2013    Lyme disease     Malignant neoplasm of prostate ([facility]) 2011    Last Assessed: 4/1/2015    Malignant tumor of prostate ([facility]) 04/01/2011    Neck mass     Last Assessed:  10/3/2016    Paresthesia of left thumb     Last Assessed: 2017    Rupture of radial collateral ligament of left thumb     Last Assessed: 2017       Past Social History  Past Surgical History:   Procedure Laterality Date    CARPAL TUNNEL RELEASE      PROSTATE SURGERY      ROTATOR CUFF REPAIR Left     TOE SURGERY Right     tip of middle toe removed on right foot     Social History     Tobacco Use   Smoking Status Former    Current packs/day: 0.00    Average packs/day: 1.5 packs/day for 5.0 years (7.5 ttl pk-yrs)    Types: Cigarettes    Start date:     Quit date: 1970    Years since quittin.2    Passive exposure: Never   Smokeless Tobacco Never   Tobacco Comments    quit        Past Family History  Family History   Problem Relation Age of Onset    Cancer Mother     Uterine cancer Mother     Diabetes Father     Congenital heart disease Sister     Lung cancer Brother        Past Social history  Social History     Socioeconomic History    Marital status: /Civil Union     Spouse name: Not on file    Number of children: Not on file    Years of education: Not on file    Highest education level: Not on file   Occupational History    Not on file   Tobacco Use    Smoking status: Former     Current packs/day: 0.00     Average packs/day: 1.5 packs/day for 5.0 years (7.5 ttl pk-yrs)     Types: Cigarettes     Start date:      Quit date: 1970     Years since quittin.2     Passive exposure: Never    Smokeless tobacco: Never    Tobacco comments:     quit    Vaping Use    Vaping status: Never Used   Substance and Sexual Activity    Alcohol use: Yes     Comment: social    Drug use: No    Sexual activity: Not Currently   Other Topics Concern    Not on file   Social History Narrative    Not on file     Social Determinants of Health     Financial Resource Strain: Low Risk  (10/23/2023)    Overall Financial Resource Strain (CARDIA)     Difficulty of Paying Living Expenses: Not very hard   Food  "Insecurity: Not on file   Transportation Needs: No Transportation Needs (10/23/2023)    PRAPARE - Transportation     Lack of Transportation (Medical): No     Lack of Transportation (Non-Medical): No   Physical Activity: Not on file   Stress: Not on file   Social Connections: Not on file   Intimate Partner Violence: Not on file   Housing Stability: Not on file       Current Medications  Current Outpatient Medications   Medication Sig Dispense Refill    Alpha-Lipoic Acid 100 MG TABS Take by mouth      Ascorbic Acid (VITAMIN C) 1000 MG tablet Take by mouth      Cholecalciferol (VITAMIN D3) 5000 units CAPS Take by mouth      Flax Oil-Fish Oil-Borage Oil ([employer] OMEGA-3 PO) Take by mouth      Lycopene 5 MG CAPS Take 1 capsule by mouth daily      methylPREDNISolone acetate (DEPO-Medrol) 40 mg/mL injection 1 mL      Misc Natural Products (TURMERIC CURCUMIN) CAPS Take by mouth      Multiple Vitamin (MULTIVITAMIN) capsule Take by mouth      rosuvastatin (CRESTOR) 5 mg tablet Take 1 tablet (5 mg total) by mouth daily 90 tablet 3    Selenium 200 MCG CAPS Take by mouth      vitamin E, tocopherol, 400 units capsule Take by mouth      Zinc 30 MG CAPS Take by mouth      lidocaine (XYLOCAINE) 1 % 1 mL       No current facility-administered medications for this visit.       Allergies  Allergies   Allergen Reactions    Grass Extracts [Gramineae Pollens]          The following portions of the patient's history were reviewed and updated as appropriate: allergies, current medications, past medical history, past social history, past surgical history and problem list.      Vitals  Vitals:    03/05/24 1327   BP: 130/80   BP Location: Left arm   Patient Position: Sitting   Cuff Size: Standard   Pulse: 69   Temp: (!) 97.3 °F (36.3 °C)   TempSrc: Temporal   SpO2: 98%   Weight: 82.6 kg (182 lb)   Height: 5' 10\" (1.778 m)           Physical Exam  Physical Exam  Constitutional:       Appearance: Normal appearance.   HENT:      Head: Normocephalic " and atraumatic.      Right Ear: External ear normal.      Left Ear: External ear normal.      Nose: Nose normal.   Eyes:      General: No scleral icterus.     Conjunctiva/sclera: Conjunctivae normal.   Cardiovascular:      Pulses: Normal pulses.   Pulmonary:      Effort: Pulmonary effort is normal.   Musculoskeletal:         General: Normal range of motion.      Cervical back: Normal range of motion.   Neurological:      General: No focal deficit present.      Mental Status: He is alert and oriented to person, place, and time.   Psychiatric:         Mood and Affect: Mood normal.         Behavior: Behavior normal.         Thought Content: Thought content normal.         Judgment: Judgment normal.           Results  No results found for this or any previous visit (from the past 1 hour(s)).]  Lab Results   Component Value Date    PSA 1.07 02/23/2024    PSA 0.93 08/21/2023    PSA 1.07 06/02/2023     Lab Results   Component Value Date    GLUCOSE 80 10/03/2014    CALCIUM 9.7 10/16/2023     10/03/2014    K 4.1 10/16/2023    CO2 27 10/16/2023     10/16/2023    BUN 18 10/16/2023    CREATININE 0.88 10/16/2023     Lab Results   Component Value Date    WBC 6.2 10/16/2023    HGB 13.4 10/16/2023    HCT 39.8 10/16/2023    MCV 90.0 10/16/2023     10/16/2023           Orders  No orders of the defined types were placed in this encounter.      Melody Herbert

## 2024-04-24 ENCOUNTER — TELEPHONE (OUTPATIENT)
Age: 74
End: 2024-04-24

## 2024-04-25 DIAGNOSIS — E78.5 BORDERLINE HYPERLIPIDEMIA: Primary | ICD-10-CM

## 2024-05-28 ENCOUNTER — TELEPHONE (OUTPATIENT)
Dept: UROLOGY | Facility: CLINIC | Age: 74
End: 2024-05-28

## 2024-05-28 NOTE — TELEPHONE ENCOUNTER
Lvm per cc informing pt of upcoming appt is to go over PSA. Informed pt they get their PSA done at any [facility] lab. Provided office number

## 2024-05-30 ENCOUNTER — RA CDI HCC (OUTPATIENT)
Dept: OTHER | Facility: HOSPITAL | Age: 74
End: 2024-05-30

## 2024-05-30 DIAGNOSIS — Z85.828 HISTORY OF BASAL CELL CARCINOMA: Primary | ICD-10-CM

## 2024-05-31 LAB
ALBUMIN SERPL-MCNC: 4.3 G/DL (ref 3.6–5.1)
ALBUMIN/GLOB SERPL: 2 (CALC) (ref 1–2.5)
ALP SERPL-CCNC: 52 U/L (ref 35–144)
ALT SERPL-CCNC: 18 U/L (ref 9–46)
AST SERPL-CCNC: 15 U/L (ref 10–35)
BASOPHILS # BLD AUTO: 21 CELLS/UL (ref 0–200)
BASOPHILS NFR BLD AUTO: 0.5 %
BILIRUB SERPL-MCNC: 0.8 MG/DL (ref 0.2–1.2)
BUN SERPL-MCNC: 13 MG/DL (ref 7–25)
BUN/CREAT SERPL: NORMAL (CALC) (ref 6–22)
CALCIUM SERPL-MCNC: 9.4 MG/DL (ref 8.6–10.3)
CHLORIDE SERPL-SCNC: 107 MMOL/L (ref 98–110)
CHOLEST SERPL-MCNC: 185 MG/DL
CHOLEST/HDLC SERPL: 3.4 (CALC)
CO2 SERPL-SCNC: 28 MMOL/L (ref 20–32)
CREAT SERPL-MCNC: 0.96 MG/DL (ref 0.7–1.28)
EOSINOPHIL # BLD AUTO: 8 CELLS/UL (ref 15–500)
EOSINOPHIL NFR BLD AUTO: 0.2 %
ERYTHROCYTE [DISTWIDTH] IN BLOOD BY AUTOMATED COUNT: 13 % (ref 11–15)
GFR/BSA.PRED SERPLBLD CYS-BASED-ARV: 83 ML/MIN/1.73M2
GLOBULIN SER CALC-MCNC: 2.1 G/DL (CALC) (ref 1.9–3.7)
GLUCOSE SERPL-MCNC: 90 MG/DL (ref 65–99)
HCT VFR BLD AUTO: 37.7 % (ref 38.5–50)
HDLC SERPL-MCNC: 55 MG/DL
HGB BLD-MCNC: 12.8 G/DL (ref 13.2–17.1)
LDLC SERPL CALC-MCNC: 112 MG/DL (CALC)
LYMPHOCYTES # BLD AUTO: 1287 CELLS/UL (ref 850–3900)
LYMPHOCYTES NFR BLD AUTO: 31.4 %
MCH RBC QN AUTO: 30.9 PG (ref 27–33)
MCHC RBC AUTO-ENTMCNC: 34 G/DL (ref 32–36)
MCV RBC AUTO: 91.1 FL (ref 80–100)
MONOCYTES # BLD AUTO: 533 CELLS/UL (ref 200–950)
MONOCYTES NFR BLD AUTO: 13 %
NEUTROPHILS # BLD AUTO: 2251 CELLS/UL (ref 1500–7800)
NEUTROPHILS NFR BLD AUTO: 54.9 %
NONHDLC SERPL-MCNC: 130 MG/DL (CALC)
PLATELET # BLD AUTO: 182 THOUSAND/UL (ref 140–400)
PMV BLD REES-ECKER: 11 FL (ref 7.5–12.5)
POTASSIUM SERPL-SCNC: 4.4 MMOL/L (ref 3.5–5.3)
PROT SERPL-MCNC: 6.4 G/DL (ref 6.1–8.1)
PSA SERPL-MCNC: 1.26 NG/ML
RBC # BLD AUTO: 4.14 MILLION/UL (ref 4.2–5.8)
SODIUM SERPL-SCNC: 140 MMOL/L (ref 135–146)
TRIGL SERPL-MCNC: 84 MG/DL
WBC # BLD AUTO: 4.1 THOUSAND/UL (ref 3.8–10.8)

## 2024-06-03 ENCOUNTER — OFFICE VISIT (OUTPATIENT)
Dept: UROLOGY | Facility: CLINIC | Age: 74
End: 2024-06-03
Payer: COMMERCIAL

## 2024-06-03 VITALS
HEART RATE: 77 BPM | OXYGEN SATURATION: 96 % | SYSTOLIC BLOOD PRESSURE: 126 MMHG | WEIGHT: 181 LBS | DIASTOLIC BLOOD PRESSURE: 78 MMHG | RESPIRATION RATE: 16 BRPM | BODY MASS INDEX: 25.91 KG/M2 | TEMPERATURE: 98.6 F | HEIGHT: 70 IN

## 2024-06-03 DIAGNOSIS — C61 PROSTATE CANCER (HCC): Primary | ICD-10-CM

## 2024-06-03 PROCEDURE — 99213 OFFICE O/P EST LOW 20 MIN: CPT | Performed by: PHYSICIAN ASSISTANT

## 2024-06-03 NOTE — PROGRESS NOTES
6/3/2024      Chief Complaint   Patient presents with    Follow-up     Assessment and Plan    [place] 7 prostate cancer s/p RALP in 2011 s/p adjuvant radiation therapy in 2012 for post-op PSA of 0.7  Rising PSA    - PSMA PET CT in December 2022 negative   - Most recent PSA from 5/30/24 increased to 1.26  - Given ongoing elevation in PSA discussed repeating PSMA PET CT at this time versus continued close observation. He wishes to proceed with repeat imaging this time. Will follow up with MD for review.     History of Present Illness  Morro Villa is a 73 y.o. male here for follow up evaluation of prostate cancer.     In 2011 he underwent a robot-assisted laparoscopic prostatectomy. Post prostatectomy had a PSA as high as 0.7. He received adjuvant radiation therapy in 2012. His PSA has been slowly rising since that time. In December 2022 he had a PSMA CT PET scan which fortunately showed no evidence of disease.  Most recent PSA has risen to 1.26. He denies any bothersome voiding complaints.     Review of Systems   Constitutional:  Negative for chills and fever.   Respiratory:  Negative for shortness of breath.    Cardiovascular:  Negative for chest pain.   Gastrointestinal:  Negative for abdominal pain.   Genitourinary:  Negative for difficulty urinating, dysuria, flank pain, frequency, hematuria and urgency.   Neurological:  Negative for dizziness.         AUA SYMPTOM SCORE      Flowsheet Row Most Recent Value   AUA SYMPTOM SCORE    How often have you had a sensation of not emptying your bladder completely after you finished urinating? 0 (P)     How often have you had to urinate again less than two hours after you finished urinating? 0 (P)     How often have you found you stopped and started again several times when you urinate? 0 (P)     How often have you found it difficult to postpone urination? 1 (P)     How often have you had a weak urinary stream? 0 (P)     How often have you had to push or strain to begin  urination? 0 (P)     How many times did you most typically get up to urinate from the time you went to bed at night until the time you got up in the morning? 4 (P)     Quality of Life: If you were to spend the rest of your life with your urinary condition just the way it is now, how would you feel about that? 1 (P)     AUA SYMPTOM SCORE 5 (P)                 Past Medical History  Past Medical History:   Diagnosis Date    Arthritis of hand     Last Assessed: 10/2/2015    Basal cell carcinoma     Last Assessed: 2017    Carcinoma of prostate (HCC) 10/08/2015    Chest pain     Last Assessed: 10/14/2014    Fall from ladder     Last Assessed: 2017    Ganglion of left wrist     Last Assessed: 10/3/2016    Lump of skin     Lsat Assessed: 2/15/2013    Lyme disease     Malignant neoplasm of prostate (HCC)     Last Assessed: 2015    Malignant tumor of prostate (HCC) 2011    Neck mass     Last Assessed: 10/3/2016    Paresthesia of left thumb     Last Assessed: 2017    Rupture of radial collateral ligament of left thumb     Last Assessed: 2017       Past Social History  Past Surgical History:   Procedure Laterality Date    CARPAL TUNNEL RELEASE      PROSTATE SURGERY      ROTATOR CUFF REPAIR Left     TOE SURGERY Right     tip of middle toe removed on right foot     Social History     Tobacco Use   Smoking Status Former    Current packs/day: 0.00    Average packs/day: 1.5 packs/day for 5.0 years (7.5 ttl pk-yrs)    Types: Cigarettes    Start date:     Quit date: 1970    Years since quittin.4    Passive exposure: Never   Smokeless Tobacco Never   Tobacco Comments    quit        Past Family History  Family History   Problem Relation Age of Onset    Cancer Mother     Uterine cancer Mother     Diabetes Father     Congenital heart disease Sister     Lung cancer Brother        Past Social history  Social History     Socioeconomic History    Marital status: /Civil Union      Spouse name: Not on file    Number of children: Not on file    Years of education: Not on file    Highest education level: Not on file   Occupational History    Not on file   Tobacco Use    Smoking status: Former     Current packs/day: 0.00     Average packs/day: 1.5 packs/day for 5.0 years (7.5 ttl pk-yrs)     Types: Cigarettes     Start date:      Quit date: 1970     Years since quittin.4     Passive exposure: Never    Smokeless tobacco: Never    Tobacco comments:     quit    Vaping Use    Vaping status: Never Used   Substance and Sexual Activity    Alcohol use: Yes     Comment: social    Drug use: No    Sexual activity: Not Currently   Other Topics Concern    Not on file   Social History Narrative    Not on file     Social Determinants of Health     Financial Resource Strain: Low Risk  (10/23/2023)    Overall Financial Resource Strain (CARDIA)     Difficulty of Paying Living Expenses: Not very hard   Food Insecurity: Not on file   Transportation Needs: No Transportation Needs (10/23/2023)    PRAPARE - Transportation     Lack of Transportation (Medical): No     Lack of Transportation (Non-Medical): No   Physical Activity: Not on file   Stress: Not on file   Social Connections: Not on file   Intimate Partner Violence: Not on file   Housing Stability: Not on file       Current Medications  Current Outpatient Medications   Medication Sig Dispense Refill    Alpha-Lipoic Acid 100 MG TABS Take by mouth      Ascorbic Acid (VITAMIN C) 1000 MG tablet Take by mouth      Cholecalciferol (VITAMIN D3) 5000 units CAPS Take by mouth      lidocaine (XYLOCAINE) 1 % 1 mL      Lycopene 5 MG CAPS Take 1 capsule by mouth daily      methylPREDNISolone acetate (DEPO-Medrol) 40 mg/mL injection 1 mL      Misc Natural Products (TURMERIC CURCUMIN) CAPS Take by mouth      Multiple Vitamin (MULTIVITAMIN) capsule Take by mouth      rosuvastatin (CRESTOR) 5 mg tablet Take 1 tablet (5 mg total) by mouth daily 90 tablet 3    Selenium  "200 MCG CAPS Take by mouth      vitamin E, tocopherol, 400 units capsule Take by mouth      Zinc 30 MG CAPS Take by mouth       No current facility-administered medications for this visit.       Allergies  Allergies   Allergen Reactions    Grass Extracts [Gramineae Pollens]          The following portions of the patient's history were reviewed and updated as appropriate: allergies, current medications, past medical history, past social history, past surgical history and problem list.      Vitals  Vitals:    06/03/24 1524   BP: 126/78   Pulse: 77   Resp: 16   Temp: 98.6 °F (37 °C)   SpO2: 96%   Weight: 82.1 kg (181 lb)   Height: 5' 10\" (1.778 m)           Physical Exam  Physical Exam  Constitutional:       Appearance: Normal appearance.   HENT:      Head: Normocephalic and atraumatic.      Right Ear: External ear normal.      Left Ear: External ear normal.      Nose: Nose normal.   Eyes:      General: No scleral icterus.     Conjunctiva/sclera: Conjunctivae normal.   Cardiovascular:      Pulses: Normal pulses.   Pulmonary:      Effort: Pulmonary effort is normal.   Musculoskeletal:         General: Normal range of motion.      Cervical back: Normal range of motion.   Neurological:      General: No focal deficit present.      Mental Status: He is alert and oriented to person, place, and time.   Psychiatric:         Mood and Affect: Mood normal.         Behavior: Behavior normal.         Thought Content: Thought content normal.         Judgment: Judgment normal.           Results  No results found for this or any previous visit (from the past 1 hour(s)).]  Lab Results   Component Value Date    PSA 1.26 05/30/2024    PSA 1.07 02/23/2024    PSA 0.93 08/21/2023     Lab Results   Component Value Date    GLUCOSE 80 10/03/2014    CALCIUM 9.4 05/30/2024     10/03/2014    K 4.4 05/30/2024    CO2 28 05/30/2024     05/30/2024    BUN 13 05/30/2024    CREATININE 0.96 05/30/2024     Lab Results   Component Value Date    " WBC 4.1 05/30/2024    HGB 12.8 (L) 05/30/2024    HCT 37.7 (L) 05/30/2024    MCV 91.1 05/30/2024     05/30/2024           Orders  Orders Placed This Encounter   Procedures    NM PET CT skull base to mid thigh     PSMA PET CT     Standing Status:   Future     Standing Expiration Date:   6/3/2028     Order Specific Question:   Release to patient through Mychart     Answer:   Immediate     Order Specific Question:   Reason for Exam     Answer:   rising PSA s/p RALP and radiation       Melody Herbert

## 2024-06-05 ENCOUNTER — TELEPHONE (OUTPATIENT)
Dept: FAMILY MEDICINE CLINIC | Facility: CLINIC | Age: 74
End: 2024-06-05

## 2024-06-05 NOTE — TELEPHONE ENCOUNTER
Called pt to reschedule 6/6 1:15p appt with Dr. SAUL as she has had a family emergency and will be out for the rest of the week. LMOM to call back and reschedule with a different provider or on a different day.

## 2024-06-24 ENCOUNTER — HOSPITAL ENCOUNTER (OUTPATIENT)
Dept: RADIOLOGY | Age: 74
Discharge: HOME | End: 2024-06-24
Payer: COMMERCIAL

## 2024-06-24 DIAGNOSIS — C61 PROSTATE CANCER (HCC): ICD-10-CM

## 2024-06-24 DIAGNOSIS — R97.21 RISING PSA FOLLOWING TREATMENT FOR MALIGNANT NEOPLASM OF PROSTATE: ICD-10-CM

## 2024-06-24 PROCEDURE — 78815 PET IMAGE W/CT SKULL-THIGH: CPT

## 2024-06-24 PROCEDURE — A9596 HB ILLUCCIX - GA 68 PSMA -11, PER MCI: HCPCS

## 2024-07-16 ENCOUNTER — TELEPHONE (OUTPATIENT)
Dept: UROLOGY | Facility: CLINIC | Age: 74
End: 2024-07-16

## 2024-07-16 NOTE — TELEPHONE ENCOUNTER
PT WAS SCHEDULED WITH [facility] FOR 7/16/24 AND WENT TO THE [facility] OFFICE INSTEAD OF THE [facility] LOCATION. HE NEEDS TO BE RESCHEDULED BECAUSE HE WASN'T GOING TO MAKE IT ON TIME PER [facility]. PT WOULD LIKE TO SEE DR BARRETO SINCE HE HAS SEEN HIM IN THE PAST.  PLEASE SCHEDULE.

## 2024-07-17 NOTE — TELEPHONE ENCOUNTER
Spoke with the patient and offered both locations that Dr. Cooper practices at to accommodate him, the patient preferred [facility].  Scheduled the patient as follows:    Date: 9/26/2024     Arrival Time 10:45     Visit Type: OFFICE VISIT LONG [facility]      Provider: Steve Cooper MD Department: [facility]     As per the patient he is at work and will obtain the address from via my chart.

## 2024-07-20 DIAGNOSIS — E78.5 BORDERLINE HYPERLIPIDEMIA: ICD-10-CM

## 2024-07-20 RX ORDER — ROSUVASTATIN CALCIUM 5 MG/1
5 TABLET, COATED ORAL DAILY
Qty: 90 TABLET | Refills: 1 | Status: SHIPPED | OUTPATIENT
Start: 2024-07-20

## 2024-07-31 ENCOUNTER — OFFICE VISIT (OUTPATIENT)
Dept: FAMILY MEDICINE CLINIC | Facility: CLINIC | Age: 74
End: 2024-07-31
Payer: COMMERCIAL

## 2024-07-31 VITALS
TEMPERATURE: 97.5 F | DIASTOLIC BLOOD PRESSURE: 58 MMHG | HEART RATE: 66 BPM | WEIGHT: 180 LBS | OXYGEN SATURATION: 97 % | BODY MASS INDEX: 25.77 KG/M2 | SYSTOLIC BLOOD PRESSURE: 116 MMHG | HEIGHT: 70 IN

## 2024-07-31 DIAGNOSIS — M54.6 LOWER THORACIC BACK PAIN: ICD-10-CM

## 2024-07-31 DIAGNOSIS — E78.5 BORDERLINE HYPERLIPIDEMIA: Primary | ICD-10-CM

## 2024-07-31 DIAGNOSIS — Z85.46 HISTORY OF PROSTATE CANCER: ICD-10-CM

## 2024-07-31 PROCEDURE — 3725F SCREEN DEPRESSION PERFORMED: CPT | Performed by: FAMILY MEDICINE

## 2024-07-31 PROCEDURE — 1160F RVW MEDS BY RX/DR IN RCRD: CPT | Performed by: FAMILY MEDICINE

## 2024-07-31 PROCEDURE — G2211 COMPLEX E/M VISIT ADD ON: HCPCS | Performed by: FAMILY MEDICINE

## 2024-07-31 PROCEDURE — 1159F MED LIST DOCD IN RCRD: CPT | Performed by: FAMILY MEDICINE

## 2024-07-31 PROCEDURE — 99214 OFFICE O/P EST MOD 30 MIN: CPT | Performed by: FAMILY MEDICINE

## 2024-07-31 RX ORDER — UBIDECARENONE 50 MG
1 CAPSULE ORAL DAILY
COMMUNITY

## 2024-07-31 NOTE — PROGRESS NOTES
Assessment/Plan:    1. Borderline hyperlipidemia  -     CBC and differential; Future; Expected date: 01/06/2025  -     Comprehensive metabolic panel; Future; Expected date: 01/06/2025  -     Lipid panel; Future; Expected date: 01/06/2025  2. History of prostate cancer  3. Lower thoracic back pain  Comments:  f/u is changing, consider chiro or PT      There are no Patient Instructions on file for this visit.    Return in about 6 months (around 1/31/2025).    Subjective:      Patient ID: Morro Villa is a 73 y.o. male.    Chief Complaint   Patient presents with    Follow-up     Check up        Here for f/u. Lipids at goal, had been off statin for about a month prior to labs, he had been away. BP controlled. PSA being followed by urology. Derm watching a lesion on the scalp. C/o back pain building over the last 2-3 months. Mid to lower back. No new injuries or trauma. No loss of bowel/bladder function. No N/T. No dysuria. No weakness. Has been doing stretches every morning, hurts when twisting in the car to see other cars on the road. R side> L side. This feels different than his lumbar disk disease. Pain does not radiate, no h/o kidney stones, no urinary sx.         The following portions of the patient's history were reviewed and updated as appropriate: allergies, current medications, past family history, past medical history, past social history, past surgical history and problem list.    Review of Systems   Constitutional:  Negative for fatigue and fever.   HENT:  Negative for congestion.    Eyes:  Negative for visual disturbance.   Respiratory:  Negative for chest tightness and shortness of breath.    Cardiovascular:  Negative for chest pain and palpitations.   Gastrointestinal:  Negative for abdominal pain.   Genitourinary:  Negative for difficulty urinating.   Musculoskeletal:  Positive for back pain and myalgias. Negative for arthralgias and gait problem.   Neurological:  Negative for headaches.  "  Hematological:  Does not bruise/bleed easily.         Current Outpatient Medications   Medication Sig Dispense Refill    Alpha-Lipoic Acid 100 MG TABS Take by mouth      Ascorbic Acid (VITAMIN C) 1000 MG tablet Take by mouth      Cholecalciferol (VITAMIN D3) 5000 units CAPS Take by mouth      Coenzyme Q10 50 MG CAPS Take 1 capsule by mouth in the morning      Lycopene 5 MG CAPS Take 1 capsule by mouth daily      Misc Natural Products (TURMERIC CURCUMIN) CAPS Take by mouth      Multiple Vitamin (MULTIVITAMIN) capsule Take by mouth      rosuvastatin (CRESTOR) 5 mg tablet TAKE ONE TABLET BY MOUTH ONE TIME DAILY 90 tablet 1    Selenium 200 MCG CAPS Take by mouth      vitamin E, tocopherol, 400 units capsule Take by mouth      Zinc 30 MG CAPS Take by mouth      lidocaine (XYLOCAINE) 1 % 1 mL (Patient not taking: Reported on 7/31/2024)       No current facility-administered medications for this visit.       Objective:    /58 (BP Location: Right arm, Patient Position: Sitting, Cuff Size: Standard)   Pulse 66   Temp 97.5 °F (36.4 °C)   Ht 5' 10\" (1.778 m)   Wt 81.6 kg (180 lb)   SpO2 97%   BMI 25.83 kg/m²        Physical Exam  Vitals reviewed.   Constitutional:       Appearance: Normal appearance. He is well-developed.   Cardiovascular:      Rate and Rhythm: Normal rate and regular rhythm.      Heart sounds: Normal heart sounds.   Pulmonary:      Effort: Pulmonary effort is normal.      Breath sounds: Normal breath sounds.   Musculoskeletal:      Right lower leg: No edema.      Left lower leg: No edema.   Skin:     General: Skin is warm.   Neurological:      General: No focal deficit present.      Mental Status: He is alert and oriented to person, place, and time.   Psychiatric:         Mood and Affect: Mood normal.         Behavior: Behavior normal.         Thought Content: Thought content normal.         Judgment: Judgment normal.                Alison Duran MD  "

## 2024-08-09 ENCOUNTER — OFFICE VISIT (OUTPATIENT)
Dept: FAMILY MEDICINE CLINIC | Facility: CLINIC | Age: 74
End: 2024-08-09
Payer: COMMERCIAL

## 2024-08-09 VITALS
HEART RATE: 63 BPM | DIASTOLIC BLOOD PRESSURE: 76 MMHG | WEIGHT: 177 LBS | TEMPERATURE: 98 F | HEIGHT: 70 IN | OXYGEN SATURATION: 99 % | BODY MASS INDEX: 25.34 KG/M2 | SYSTOLIC BLOOD PRESSURE: 132 MMHG

## 2024-08-09 DIAGNOSIS — J34.89 SINUS PRESSURE: ICD-10-CM

## 2024-08-09 DIAGNOSIS — J01.10 ACUTE NON-RECURRENT FRONTAL SINUSITIS: ICD-10-CM

## 2024-08-09 DIAGNOSIS — R09.82 PND (POST-NASAL DRIP): ICD-10-CM

## 2024-08-09 DIAGNOSIS — J02.9 SORE THROAT: Primary | ICD-10-CM

## 2024-08-09 PROCEDURE — G2211 COMPLEX E/M VISIT ADD ON: HCPCS | Performed by: FAMILY MEDICINE

## 2024-08-09 PROCEDURE — 99213 OFFICE O/P EST LOW 20 MIN: CPT | Performed by: FAMILY MEDICINE

## 2024-08-09 RX ORDER — AMOXICILLIN 500 MG/1
500 CAPSULE ORAL EVERY 12 HOURS SCHEDULED
Qty: 20 CAPSULE | Refills: 0 | Status: SHIPPED | OUTPATIENT
Start: 2024-08-09 | End: 2024-08-19

## 2024-08-09 NOTE — PROGRESS NOTES
Outpatient Progress Note  Name: Morro Villa      : 1950      MRN: [id number]  Encounter Provider: Carlos Eduardo Mitchell MD  Encounter Date: 2024   Encounter department: [facility]    Assessment & Plan   1. Sore throat  2. Sinus pressure  3. PND (post-nasal drip)  4. Acute non-recurrent frontal sinusitis  -     amoxicillin (AMOXIL) 500 mg capsule; Take 1 capsule (500 mg total) by mouth every 12 (twelve) hours for 10 days    Patient's symptom likely secondary to mild allergy versus viral symptoms, encourage patient to continue over-the-counter medication, DayQuil and NyQuil  If there is significant worsening symptoms such as fever worsening pain or worsening respiratory symptoms please start amoxicillin for total of 10 days for treatment of sinusitis    Disposition:     I have spent a total time of 10 minutes on the day of the encounter for this patient including        Encounter provider: Carlos Eduardo Mitchell MD     Provider located at: [address]  [facility] [address] [phone]     Recent Visits  No visits were found meeting these conditions.  Showing recent visits within past 7 days and meeting all other requirements  Today's Visits  Date Type Provider Dept   24 Office Visit Carlos Eduardo Mitchell MD [facility]   Showing today's visits and meeting all other requirements  Future Appointments  No visits were found meeting these conditions.  Showing future appointments within next 150 days and meeting all other requirements    History of Present Illness      Subjective:   Morro Villa is a 73 y.o. male who is concerned about COVID-19. Patient's symptoms include fatigue, malaise, nasal congestion, rhinorrhea, sore throat and cough. Patient denies fever, chills, anosmia, loss of taste, shortness of breath, chest tightness, abdominal pain, nausea, vomiting, diarrhea, myalgias and headaches.     - Date of symptom  "onset: 8/6/2024      COVID-19 vaccination status: Fully vaccinated (primary series)    Exposure:   Contact with a person who is under investigation (PUI) for or who is positive for COVID-19 within the last 14 days?: No    Hospitalized recently for fever and/or lower respiratory symptoms?: No      Currently a healthcare worker that is involved in direct patient care?: No      Works in a special setting where the risk of COVID-19 transmission may be high? (this may include long-term care, correctional and penitentiary facilities; homeless shelters; assisted-living facilities and group homes.): No      Resident in a special setting where the risk of COVID-19 transmission may be high? (this may include long-term care, correctional and penitentiary facilities; homeless shelters; assisted-living facilities and group homes.): No      Muffled hearing  Appetite intact      Lab Results   Component Value Date    SARSCOV2 Positive (A) 01/17/2022       Review of Systems   Constitutional:  Positive for fatigue. Negative for chills and fever.   HENT:  Positive for congestion, rhinorrhea and sore throat.    Respiratory:  Positive for cough. Negative for chest tightness and shortness of breath.    Gastrointestinal:  Negative for abdominal pain, diarrhea, nausea and vomiting.   Musculoskeletal:  Negative for myalgias.   Neurological:  Negative for headaches.     Objective     /76 (BP Location: Right arm, Patient Position: Sitting, Cuff Size: Standard)   Pulse 63   Temp 98 °F (36.7 °C) (Temporal)   Ht 5' 10\" (1.778 m)   Wt 80.3 kg (177 lb)   SpO2 99%   BMI 25.40 kg/m²     Physical Exam  Vitals reviewed.   Constitutional:       General: He is not in acute distress.     Appearance: Normal appearance. He is not ill-appearing, toxic-appearing or diaphoretic.   HENT:      Head: Normocephalic.      Right Ear: Tympanic membrane, ear canal and external ear normal. There is no impacted cerumen.      Left Ear: Tympanic membrane, ear canal " "and external ear normal. There is no impacted cerumen.      Nose:      Comments: Pnd noted  Cardiovascular:      Rate and Rhythm: Normal rate.      Pulses: Normal pulses.   Pulmonary:      Effort: Pulmonary effort is normal.   Abdominal:      General: Abdomen is flat.   Musculoskeletal:         General: No swelling or deformity.   Skin:     General: Skin is warm and dry.      Capillary Refill: Capillary refill takes less than 2 seconds.      Coloration: Skin is not jaundiced.   Neurological:      General: No focal deficit present.      Mental Status: He is alert.   Psychiatric:         Mood and Affect: Mood normal.             Carlos Eduardo Mitchell M.D.  Family Medicine    Please excuse any \"sound-alike\" errors that may have ocurred during the process of dictation. Parts of this note have been dictated and there may be errors present in the transcription process. Thank you.    "

## 2024-09-26 ENCOUNTER — OFFICE VISIT (OUTPATIENT)
Dept: UROLOGY | Facility: CLINIC | Age: 74
End: 2024-09-26
Payer: COMMERCIAL

## 2024-09-26 VITALS
SYSTOLIC BLOOD PRESSURE: 128 MMHG | OXYGEN SATURATION: 99 % | WEIGHT: 180 LBS | HEART RATE: 71 BPM | BODY MASS INDEX: 25.77 KG/M2 | DIASTOLIC BLOOD PRESSURE: 78 MMHG | HEIGHT: 70 IN

## 2024-09-26 DIAGNOSIS — C61 PROSTATE CANCER (HCC): Primary | ICD-10-CM

## 2024-09-26 PROCEDURE — 99214 OFFICE O/P EST MOD 30 MIN: CPT | Performed by: UROLOGY

## 2024-09-26 NOTE — PROGRESS NOTES
Ambulatory Visit  Name: Morro Villa      : 1950      MRN: [id number]  Encounter Provider: Steve Cooper MD  Encounter Date: 2024   Encounter department: [facility] [place]    Assessment & Plan  Prostate cancer (HCC)  Impression: History [place] 7 prostate cancer, status post robot-assisted laparoscopic prostatectomy (), post prostatectomy PSA vipul 0.7, status post adjuvant radiation therapy, rising PSA, negative PSMA CT PET scan    Plan: I discussed with Torito his slowly rising PSA which has doubled in just over 3 years.  Fortunately his most recent PSMA CT PET scan shows no evidence of PSMA avid lesions.  I see no indication to give androgen deprivation therapy as the patient is asymptomatic without radiographic evidence of disease.  We discussed that this could be a benign piece of prostate tissue that has laid dormant and is now making PSA or even a small indolent piece of prostate cancer tissue which is also slowly making PSA.  I recommend that he repeat his PSA in 3 months time which will be 6 months from the last PSA.  He will then repeat a PSA 6 months after that and return in follow-up.  He will return sooner if the next PSA is rising more rapidly.  We discussed his next PSMA CT PET scan based on PSA kinetics/doubling time.  The patient is amenable with this plan.  Orders:    PSA Total, Diagnostic; Future    PSA Total, Diagnostic; Future      History of Present Illness     Morro Villa is a 74 y.o. male who presents for follow-up regarding his prostate cancer.  I last saw Torito in .  He underwent robot-assisted laparoscopic prostatectomy for [place] 7 prostate cancer in .  He required adjuvant radiation therapy when his PSA level was as high as 0.7.  In 2022 he underwent a PSMA CT PET scan which showed no evidence of disease.  This was recently repeated in 2024 when his PSA level rainer to 1.26.  The most recent PSMA CT PET scan  "shows no evidence of PSMA avid lesions.  Recent PSA doubling time is just over 3 years.  He is completely asymptomatic.  He is voiding well and is dry.  He has no complaints at this time      Review of Systems        AUA SYMPTOM SCORE      Flowsheet Row Most Recent Value   AUA SYMPTOM SCORE    How often have you had a sensation of not emptying your bladder completely after you finished urinating? 0 (P)     How often have you had to urinate again less than two hours after you finished urinating? 0 (P)     How often have you found you stopped and started again several times when you urinate? 0 (P)     How often have you found it difficult to postpone urination? 1 (P)     How often have you had a weak urinary stream? 0 (P)     How often have you had to push or strain to begin urination? 0 (P)     How many times did you most typically get up to urinate from the time you went to bed at night until the time you got up in the morning? 3 (P)     Quality of Life: If you were to spend the rest of your life with your urinary condition just the way it is now, how would you feel about that? 0 (P)     AUA SYMPTOM SCORE 4 (P)            Objective     /78 (BP Location: Left arm, Patient Position: Sitting, Cuff Size: Adult)   Pulse 71   Ht 5' 10\" (1.778 m)   Wt 81.6 kg (180 lb)   SpO2 99%   BMI 25.83 kg/m²   Physical Exam on examination he is in no acute distress.  Gait normal.  Affect normal  Results  Lab Results   Component Value Date    PSA 1.26 05/30/2024    PSA 1.07 02/23/2024    PSA 0.93 08/21/2023     Lab Results   Component Value Date    GLUCOSE 80 10/03/2014    CALCIUM 9.4 05/30/2024     10/03/2014    K 4.4 05/30/2024    CO2 28 05/30/2024     05/30/2024    BUN 13 05/30/2024    CREATININE 0.96 05/30/2024     Lab Results   Component Value Date    WBC 4.1 05/30/2024    HGB 12.8 (L) 05/30/2024    HCT 37.7 (L) 05/30/2024    MCV 91.1 05/30/2024     05/30/2024       Office Urine Dip  No results found " for this or any previous visit (from the past 1 hour(s)).]    Administrative Statements

## 2024-09-26 NOTE — ASSESSMENT & PLAN NOTE
Impression: History [place] 7 prostate cancer, status post robot-assisted laparoscopic prostatectomy (2011), post prostatectomy PSA vipul 0.7, status post adjuvant radiation therapy, rising PSA, negative PSMA CT PET scan    Plan: I discussed with Torito his slowly rising PSA which has doubled in just over 3 years.  Fortunately his most recent PSMA CT PET scan shows no evidence of PSMA avid lesions.  I see no indication to give androgen deprivation therapy as the patient is asymptomatic without radiographic evidence of disease.  We discussed that this could be a benign piece of prostate tissue that has laid dormant and is now making PSA or even a small indolent piece of prostate cancer tissue which is also slowly making PSA.  I recommend that he repeat his PSA in 3 months time which will be 6 months from the last PSA.  He will then repeat a PSA 6 months after that and return in follow-up.  He will return sooner if the next PSA is rising more rapidly.  We discussed his next PSMA CT PET scan based on PSA kinetics/doubling time.  The patient is amenable with this plan.  Orders:    PSA Total, Diagnostic; Future    PSA Total, Diagnostic; Future

## 2025-01-25 LAB
ALBUMIN SERPL-MCNC: 4.9 G/DL (ref 3.6–5.1)
ALBUMIN/GLOB SERPL: 2.5 (CALC) (ref 1–2.5)
ALP SERPL-CCNC: 52 U/L (ref 35–144)
ALT SERPL-CCNC: 27 U/L (ref 9–46)
AST SERPL-CCNC: 22 U/L (ref 10–35)
BASOPHILS # BLD AUTO: 11 CELLS/UL (ref 0–200)
BASOPHILS NFR BLD AUTO: 0.3 %
BILIRUB SERPL-MCNC: 1 MG/DL (ref 0.2–1.2)
BUN SERPL-MCNC: 16 MG/DL (ref 7–25)
BUN/CREAT SERPL: NORMAL (CALC) (ref 6–22)
CALCIUM SERPL-MCNC: 9.6 MG/DL (ref 8.6–10.3)
CHLORIDE SERPL-SCNC: 104 MMOL/L (ref 98–110)
CHOLEST SERPL-MCNC: 188 MG/DL
CHOLEST/HDLC SERPL: 3.4 (CALC)
CO2 SERPL-SCNC: 30 MMOL/L (ref 20–32)
CREAT SERPL-MCNC: 0.93 MG/DL (ref 0.7–1.28)
EOSINOPHIL # BLD AUTO: 30 CELLS/UL (ref 15–500)
EOSINOPHIL NFR BLD AUTO: 0.8 %
ERYTHROCYTE [DISTWIDTH] IN BLOOD BY AUTOMATED COUNT: 13 % (ref 11–15)
GFR/BSA.PRED SERPLBLD CYS-BASED-ARV: 86 ML/MIN/1.73M2
GLOBULIN SER CALC-MCNC: 2 G/DL (CALC) (ref 1.9–3.7)
GLUCOSE SERPL-MCNC: 80 MG/DL (ref 65–99)
HCT VFR BLD AUTO: 41.8 % (ref 38.5–50)
HDLC SERPL-MCNC: 55 MG/DL
HGB BLD-MCNC: 13.9 G/DL (ref 13.2–17.1)
LDLC SERPL CALC-MCNC: 114 MG/DL (CALC)
LYMPHOCYTES # BLD AUTO: 1288 CELLS/UL (ref 850–3900)
LYMPHOCYTES NFR BLD AUTO: 34.8 %
MCH RBC QN AUTO: 30.5 PG (ref 27–33)
MCHC RBC AUTO-ENTMCNC: 33.3 G/DL (ref 32–36)
MCV RBC AUTO: 91.9 FL (ref 80–100)
MONOCYTES # BLD AUTO: 470 CELLS/UL (ref 200–950)
MONOCYTES NFR BLD AUTO: 12.7 %
NEUTROPHILS # BLD AUTO: 1902 CELLS/UL (ref 1500–7800)
NEUTROPHILS NFR BLD AUTO: 51.4 %
NONHDLC SERPL-MCNC: 133 MG/DL (CALC)
PLATELET # BLD AUTO: 193 THOUSAND/UL (ref 140–400)
PMV BLD REES-ECKER: 11 FL (ref 7.5–12.5)
POTASSIUM SERPL-SCNC: 4 MMOL/L (ref 3.5–5.3)
PROT SERPL-MCNC: 6.9 G/DL (ref 6.1–8.1)
PSA SERPL-MCNC: 1.13 NG/ML
RBC # BLD AUTO: 4.55 MILLION/UL (ref 4.2–5.8)
SODIUM SERPL-SCNC: 140 MMOL/L (ref 135–146)
TRIGL SERPL-MCNC: 87 MG/DL
WBC # BLD AUTO: 3.7 THOUSAND/UL (ref 3.8–10.8)

## 2025-02-03 ENCOUNTER — OFFICE VISIT (OUTPATIENT)
Dept: FAMILY MEDICINE CLINIC | Facility: CLINIC | Age: 75
End: 2025-02-03
Payer: COMMERCIAL

## 2025-02-03 VITALS
OXYGEN SATURATION: 96 % | SYSTOLIC BLOOD PRESSURE: 112 MMHG | DIASTOLIC BLOOD PRESSURE: 62 MMHG | HEIGHT: 68 IN | TEMPERATURE: 97.3 F | BODY MASS INDEX: 27.86 KG/M2 | HEART RATE: 82 BPM | WEIGHT: 183.8 LBS

## 2025-02-03 DIAGNOSIS — Z85.46 HISTORY OF PROSTATE CANCER: Primary | ICD-10-CM

## 2025-02-03 DIAGNOSIS — E78.5 BORDERLINE HYPERLIPIDEMIA: ICD-10-CM

## 2025-02-03 DIAGNOSIS — Z00.00 MEDICARE ANNUAL WELLNESS VISIT, SUBSEQUENT: ICD-10-CM

## 2025-02-03 PROBLEM — C61 PROSTATE CANCER (HCC): Status: RESOLVED | Noted: 2024-09-26 | Resolved: 2025-02-03

## 2025-02-03 PROCEDURE — G2211 COMPLEX E/M VISIT ADD ON: HCPCS | Performed by: FAMILY MEDICINE

## 2025-02-03 PROCEDURE — 99213 OFFICE O/P EST LOW 20 MIN: CPT | Performed by: FAMILY MEDICINE

## 2025-02-03 PROCEDURE — G0439 PPPS, SUBSEQ VISIT: HCPCS | Performed by: FAMILY MEDICINE

## 2025-02-03 RX ORDER — ROSUVASTATIN CALCIUM 5 MG/1
5 TABLET, COATED ORAL DAILY
Qty: 90 TABLET | Refills: 3 | Status: SHIPPED | OUTPATIENT
Start: 2025-02-03

## 2025-02-03 NOTE — PROGRESS NOTES
Name: Morro Villa      : 1950      MRN: [id number]  Encounter Provider: Alison Duran MD  Encounter Date: 2/3/2025   Encounter department: [facility]    Assessment & Plan  Borderline hyperlipidemia    Orders:    rosuvastatin (CRESTOR) 5 mg tablet; Take 1 tablet (5 mg total) by mouth daily    CBC and differential; Future    Comprehensive metabolic panel; Future    Lipid panel; Future    History of prostate cancer         Medicare annual wellness visit, subsequent            Preventive health issues were discussed with patient, and age appropriate screening tests were ordered as noted in patient's After Visit Summary. Personalized health advice and appropriate referrals for health education or preventive services given if needed, as noted in patient's After Visit Summary.    History of Present Illness     Here for f/u, c/o R lower back pain x 1 week, no trauma, no change in activity. And L lateral leg pain that lingers for a few mintues and then goes away, not worsening. Can happens every few days. No N/T down the legs. No loss of bowel or bladder function. PSA has been stable, follows with urology every 6 months. BP controlled. Lipids at goal.     Back Pain  Associated symptoms include leg pain. Pertinent negatives include no abdominal pain, chest pain, fever, headaches, numbness or weakness.   Leg Pain   Pertinent negatives include no numbness.      Patient Care Team:  Alison Duran MD as PCP - General  ANASTACIA Briseno MD Kristofer Matullo, MD Michael Morrissey, MD    Review of Systems   Constitutional:  Negative for fatigue and fever.   HENT:  Negative for congestion.    Eyes:  Negative for visual disturbance.   Respiratory:  Negative for chest tightness and shortness of breath.    Cardiovascular:  Negative for chest pain and palpitations.   Gastrointestinal:  Negative for abdominal pain.   Genitourinary:  Negative for difficulty urinating.    Musculoskeletal:  Positive for back pain and myalgias. Negative for arthralgias.   Neurological:  Negative for weakness, numbness and headaches.   Hematological:  Does not bruise/bleed easily.     Medical History Reviewed by provider this encounter:       Annual Wellness Visit Questionnaire   Morro is here for his Subsequent Wellness visit. Last Medicare Wellness visit information reviewed, patient interviewed and updates made to the record today.      Health Risk Assessment:   Patient rates overall health as excellent. Patient feels that their physical health rating is same. Patient is very satisfied with their life. Eyesight was rated as slightly worse. Hearing was rated as same. Patient feels that their emotional and mental health rating is same. Patients states they are never, rarely angry. Patient states they are often unusually tired/fatigued. Pain experienced in the last 7 days has been some. Patient's pain rating has been 5/10. Patient states that he has experienced no weight loss or gain in last 6 months.     Depression Screening:   PHQ-2 Score: 0      Fall Risk Screening:   In the past year, patient has experienced: no history of falling in past year      Home Safety:  Patient does not have trouble with stairs inside or outside of their home. Patient has working smoke alarms and has working carbon monoxide detector. Home safety hazards include: none.     Nutrition:   Current diet is Regular.     Medications:   Patient is currently taking over-the-counter supplements. OTC medications include: see medication list. Patient is able to manage medications.     Activities of Daily Living (ADLs)/Instrumental Activities of Daily Living (IADLs):   Walk and transfer into and out of bed and chair?: Yes  Dress and groom yourself?: Yes    Bathe or shower yourself?: Yes    Feed yourself? Yes  Do your laundry/housekeeping?: Yes  Manage your money, pay your bills and track your expenses?: Yes  Make your own meals?: Yes     Do your own shopping?: Yes    Previous Hospitalizations:   Any hospitalizations or ED visits within the last 12 months?: No      Advance Care Planning:   Living will: No    Durable POA for healthcare: No    Advanced directive: No    Advanced directive counseling given: Yes    ACP document given: Yes      Cognitive Screening:   Provider or family/friend/caregiver concerned regarding cognition?: No    PREVENTIVE SCREENINGS      Cardiovascular Screening:    General: Screening Not Indicated and History Lipid Disorder      Diabetes Screening:     General: Screening Current      Colorectal Cancer Screening:     General: Screening Current      Prostate Cancer Screening:    General: History Prostate Cancer and Screening Not Indicated      Osteoporosis Screening:    General: Screening Not Indicated      Abdominal Aortic Aneurysm (AAA) Screening:    Risk factors include: age between 65-76 yo and tobacco use        General: Screening Current      Lung Cancer Screening:     General: Screening Not Indicated      Hepatitis C Screening:    General: Screening Current    Screening, Brief Intervention, and Referral to Treatment (SBIRT)    Screening  Typical number of drinks in a day: 0  Typical number of drinks in a week: 0  Interpretation: Low risk drinking behavior.    AUDIT-C Screenin) How often did you have a drink containing alcohol in the past year? monthly or less  2) How many drinks did you have on a typical day when you were drinking in the past year? 0  3) How often did you have 6 or more drinks on one occasion in the past year? never    AUDIT-C Score: 1  Interpretation: Score 0-3 (male): Negative screen for alcohol misuse    Single Item Drug Screening:  How often have you used an illegal drug (including marijuana) or a prescription medication for non-medical reasons in the past year? never    Single Item Drug Screen Score: 0  Interpretation: Negative screen for possible drug use disorder    Social Drivers of Health  "    Financial Resource Strain: Low Risk  (10/23/2023)    Overall Financial Resource Strain (CARDIA)     Difficulty of Paying Living Expenses: Not very hard   Food Insecurity: No Food Insecurity (1/29/2025)    Hunger Vital Sign     Worried About Running Out of Food in the Last Year: Never true     Ran Out of Food in the Last Year: Never true   Transportation Needs: No Transportation Needs (1/29/2025)    PRAPARE - Transportation     Lack of Transportation (Medical): No     Lack of Transportation (Non-Medical): No   Housing Stability: Low Risk  (1/29/2025)    Housing Stability Vital Sign     Unable to Pay for Housing in the Last Year: No     Number of Times Moved in the Last Year: 0     Homeless in the Last Year: No   Utilities: Not At Risk (1/29/2025)    [facility] Utilities     Threatened with loss of utilities: No     No results found.    Objective   /62 (BP Location: Left arm, Patient Position: Sitting, Cuff Size: Large)   Pulse 82   Temp (!) 97.3 °F (36.3 °C) (Temporal)   Ht 5' 7.5\" (1.715 m)   Wt 83.4 kg (183 lb 12.8 oz)   SpO2 96%   BMI 28.36 kg/m²     Physical Exam  Vitals reviewed.   Constitutional:       Appearance: Normal appearance.   Cardiovascular:      Rate and Rhythm: Normal rate and regular rhythm.      Heart sounds: Normal heart sounds.   Pulmonary:      Effort: Pulmonary effort is normal.      Breath sounds: Normal breath sounds.   Musculoskeletal:      Right lower leg: No edema.      Left lower leg: No edema.   Neurological:      General: No focal deficit present.      Mental Status: He is alert and oriented to person, place, and time.   Psychiatric:         Mood and Affect: Mood normal.         Behavior: Behavior normal.         Thought Content: Thought content normal.         Judgment: Judgment normal.         "

## 2025-02-03 NOTE — PATIENT INSTRUCTIONS
Medicare Preventive Visit Patient Instructions  Thank you for completing your Welcome to Medicare Visit or Medicare Annual Wellness Visit today. Your next wellness visit will be due in one year (2/4/2026).  The screening/preventive services that you may require over the next 5-10 years are detailed below. Some tests may not apply to you based off risk factors and/or age. Screening tests ordered at today's visit but not completed yet may show as past due. Also, please note that scanned in results may not display below.  Preventive Screenings:  Service Recommendations Previous Testing/Comments   Colorectal Cancer Screening  Colonoscopy    Fecal Occult Blood Test (FOBT)/Fecal Immunochemical Test (FIT)  Fecal DNA/Cologuard Test  Flexible Sigmoidoscopy Age: 45-75 years old   Colonoscopy: every 10 years (May be performed more frequently if at higher risk)  OR  FOBT/FIT: every 1 year  OR  Cologuard: every 3 years  OR  Sigmoidoscopy: every 5 years  Screening may be recommended earlier than age 45 if at higher risk for colorectal cancer. Also, an individualized decision between you and your healthcare provider will decide whether screening between the ages of 76-85 would be appropriate. Colonoscopy: 10/06/2022  FOBT/FIT: Not on file  Cologuard: Not on file  Sigmoidoscopy: Not on file    Screening Current     Prostate Cancer Screening Individualized decision between patient and health care provider in men between ages of 55-69   Medicare will cover every 12 months beginning on the day after your 50th birthday PSA: 1.13 ng/mL     History Prostate Cancer  Screening Not Indicated     Hepatitis C Screening Once for adults born between 1945 and 1965  More frequently in patients at high risk for Hepatitis C Hep C Antibody: 08/21/2020    Screening Current   Diabetes Screening 1-2 times per year if you're at risk for diabetes or have pre-diabetes Fasting glucose: 85 mg/dL (3/8/2021)  A1C: No results in last 5 years (No results in  last 5 years)  Screening Current   Cholesterol Screening Once every 5 years if you don't have a lipid disorder. May order more often based on risk factors. Lipid panel: 01/24/2025  Screening Not Indicated  History Lipid Disorder      Other Preventive Screenings Covered by Medicare:  Abdominal Aortic Aneurysm (AAA) Screening: covered once if your at risk. You're considered to be at risk if you have a family history of AAA or a male between the age of 65-75 who smoking at least 100 cigarettes in your lifetime.  Lung Cancer Screening: covers low dose CT scan once per year if you meet all of the following conditions: (1) Age 55-77; (2) No signs or symptoms of lung cancer; (3) Current smoker or have quit smoking within the last 15 years; (4) You have a tobacco smoking history of at least 20 pack years (packs per day x number of years you smoked); (5) You get a written order from a healthcare provider.  Glaucoma Screening: covered annually if you're considered high risk: (1) You have diabetes OR (2) Family history of glaucoma OR (3)  aged 50 and older OR (4)  American aged 65 and older  Osteoporosis Screening: covered every 2 years if you meet one of the following conditions: (1) Have a vertebral abnormality; (2) On glucocorticoid therapy for more than 3 months; (3) Have primary hyperparathyroidism; (4) On osteoporosis medications and need to assess response to drug therapy.  HIV Screening: covered annually if you're between the age of 15-65. Also covered annually if you are younger than 15 and older than 65 with risk factors for HIV infection. For pregnant patients, it is covered up to 3 times per pregnancy.    Immunizations:  Immunization Recommendations   Influenza Vaccine Annual influenza vaccination during flu season is recommended for all persons aged >= 6 months who do not have contraindications   Pneumococcal Vaccine   * Pneumococcal conjugate vaccine = PCV13 (Prevnar 13), PCV15  (Vaxneuvance), PCV20 (Prevnar 20)  * Pneumococcal polysaccharide vaccine = PPSV23 (Pneumovax) Adults 19-63 yo with certain risk factors or if 65+ yo  If never received any pneumonia vaccine: recommend Prevnar 20 (PCV20)  Give PCV20 if previously received 1 dose of PCV13 or PPSV23   Hepatitis B Vaccine 3 dose series if at intermediate or high risk (ex: diabetes, end stage renal disease, liver disease)   Respiratory syncytial virus (RSV) Vaccine - COVERED BY MEDICARE PART D  * RSVPreF3 (Arexvy) CDC recommends that adults 60 years of age and older may receive a single dose of RSV vaccine using shared clinical decision-making (SCDM)   Tetanus (Td) Vaccine - COST NOT COVERED BY MEDICARE PART B Following completion of primary series, a booster dose should be given every 10 years to maintain immunity against tetanus. Td may also be given as tetanus wound prophylaxis.   Tdap Vaccine - COST NOT COVERED BY MEDICARE PART B Recommended at least once for all adults. For pregnant patients, recommended with each pregnancy.   Shingles Vaccine (Shingrix) - COST NOT COVERED BY MEDICARE PART B  2 shot series recommended in those 19 years and older who have or will have weakened immune systems or those 50 years and older     Health Maintenance Due:      Topic Date Due   • Colorectal Cancer Screening  10/06/2025   • Hepatitis C Screening  Completed     Immunizations Due:      Topic Date Due   • Pneumococcal Vaccine: 65+ Years (2 of 2 - PCV) 08/18/2016   • Influenza Vaccine (1) Never done   • COVID-19 Vaccine (3 - 2024-25 season) 09/01/2024     Advance Directives   What are advance directives?  Advance directives are legal documents that state your wishes and plans for medical care. These plans are made ahead of time in case you lose your ability to make decisions for yourself. Advance directives can apply to any medical decision, such as the treatments you want, and if you want to donate organs.   What are the types of advance  directives?  There are many types of advance directives, and each state has rules about how to use them. You may choose a combination of any of the following:  Living will:  This is a written record of the treatment you want. You can also choose which treatments you do not want, which to limit, and which to stop at a certain time. This includes surgery, medicine, IV fluid, and tube feedings.   Durable power of  for healthcare (DPAHC):  This is a written record that states who you want to make healthcare choices for you when you are unable to make them for yourself. This person, called a proxy, is usually a family member or a friend. You may choose more than 1 proxy.  Do not resuscitate (DNR) order:  A DNR order is used in case your heart stops beating or you stop breathing. It is a request not to have certain forms of treatment, such as CPR. A DNR order may be included in other types of advance directives.  Medical directive:  This covers the care that you want if you are in a coma, near death, or unable to make decisions for yourself. You can list the treatments you want for each condition. Treatment may include pain medicine, surgery, blood transfusions, dialysis, IV or tube feedings, and a ventilator (breathing machine).  Values history:  This document has questions about your views, beliefs, and how you feel and think about life. This information can help others choose the care that you would choose.  Why are advance directives important?  An advance directive helps you control your care. Although spoken wishes may be used, it is better to have your wishes written down. Spoken wishes can be misunderstood, or not followed. Treatments may be given even if you do not want them. An advance directive may make it easier for your family to make difficult choices about your care.   Weight Management   Why it is important to manage your weight:  Being overweight increases your risk of health conditions such as  heart disease, high blood pressure, type 2 diabetes, and certain types of cancer. It can also increase your risk for osteoarthritis, sleep apnea, and other respiratory problems. Aim for a slow, steady weight loss. Even a small amount of weight loss can lower your risk of health problems.  How to lose weight safely:  A safe and healthy way to lose weight is to eat fewer calories and get regular exercise. You can lose up about 1 pound a week by decreasing the number of calories you eat by 500 calories each day.   Healthy meal plan for weight management:  A healthy meal plan includes a variety of foods, contains fewer calories, and helps you stay healthy. A healthy meal plan includes the following:  Eat whole-grain foods more often.  A healthy meal plan should contain fiber. Fiber is the part of grains, fruits, and vegetables that is not broken down by your body. Whole-grain foods are healthy and provide extra fiber in your diet. Some examples of whole-grain foods are whole-wheat breads and pastas, oatmeal, brown rice, and bulgur.  Eat a variety of vegetables every day.  Include dark, leafy greens such as spinach, kale, ángel greens, and mustard greens. Eat yellow and orange vegetables such as carrots, sweet potatoes, and winter squash.   Eat a variety of fruits every day.  Choose fresh or canned fruit (canned in its own juice or light syrup) instead of juice. Fruit juice has very little or no fiber.  Eat low-fat dairy foods.  Drink fat-free (skim) milk or 1% milk. Eat fat-free yogurt and low-fat cottage cheese. Try low-fat cheeses such as mozzarella and other reduced-fat cheeses.  Choose meat and other protein foods that are low in fat.  Choose beans or other legumes such as split peas or lentils. Choose fish, skinless poultry (chicken or turkey), or lean cuts of red meat (beef or pork). Before you cook meat or poultry, cut off any visible fat.   Use less fat and oil.  Try baking foods instead of frying them. Add  less fat, such as margarine, sour cream, regular salad dressing and mayonnaise to foods. Eat fewer high-fat foods. Some examples of high-fat foods include french fries, doughnuts, ice cream, and cakes.  Eat fewer sweets.  Limit foods and drinks that are high in sugar. This includes candy, cookies, regular soda, and sweetened drinks.  Exercise:  Exercise at least 30 minutes per day on most days of the week. Some examples of exercise include walking, biking, dancing, and swimming. You can also fit in more physical activity by taking the stairs instead of the elevator or parking farther away from stores. Ask your healthcare provider about the best exercise plan for you.      © Copyright [employer] 2018 Information is for End User's use only and may not be sold, redistributed or otherwise used for commercial purposes. All illustrations and images included in CareNotes® are the copyrighted property of A.D.A.M., Inc. or [employer] (P)

## 2025-04-02 ENCOUNTER — OFFICE VISIT (OUTPATIENT)
Dept: FAMILY MEDICINE CLINIC | Facility: CLINIC | Age: 75
End: 2025-04-02
Payer: COMMERCIAL

## 2025-04-02 VITALS
HEIGHT: 68 IN | RESPIRATION RATE: 16 BRPM | TEMPERATURE: 98.7 F | HEART RATE: 81 BPM | DIASTOLIC BLOOD PRESSURE: 58 MMHG | WEIGHT: 184 LBS | OXYGEN SATURATION: 98 % | BODY MASS INDEX: 27.89 KG/M2 | SYSTOLIC BLOOD PRESSURE: 118 MMHG

## 2025-04-02 DIAGNOSIS — J40 BRONCHITIS: Primary | ICD-10-CM

## 2025-04-02 PROBLEM — G56.00 CARPAL TUNNEL SYNDROME: Status: ACTIVE | Noted: 2023-03-16

## 2025-04-02 PROBLEM — R20.0 LEFT ARM NUMBNESS: Status: RESOLVED | Noted: 2022-10-12 | Resolved: 2025-04-02

## 2025-04-02 PROCEDURE — G2211 COMPLEX E/M VISIT ADD ON: HCPCS | Performed by: INTERNAL MEDICINE

## 2025-04-02 PROCEDURE — 99213 OFFICE O/P EST LOW 20 MIN: CPT | Performed by: INTERNAL MEDICINE

## 2025-04-02 RX ORDER — AZITHROMYCIN 250 MG/1
500 TABLET, FILM COATED ORAL EVERY 24 HOURS
Qty: 10 TABLET | Refills: 0 | Status: SHIPPED | OUTPATIENT
Start: 2025-04-02 | End: 2025-04-07

## 2025-04-02 NOTE — PROGRESS NOTES
Name: Morro Villa      : 1950      MRN: [id number]  Encounter Provider: Pura Rodriguez MD  Encounter Date: 2025   Encounter department: [facility]  :  Assessment & Plan  Bronchitis    Orders:    azithromycin (ZITHROMAX) 250 mg tablet; Take 2 tablets (500 mg total) by mouth every 24 hours for 5 days           History of Present Illness   Patient complains of URI symptoms with active cough    Cough  This is a new problem. The current episode started in the past 7 days. The problem has been waxing and waning. The problem occurs every few minutes. The cough is Productive of sputum. Associated symptoms include chills, a fever, headaches, myalgias, nasal congestion, postnasal drip and a rash. Pertinent negatives include no chest pain, ear pain, sore throat, shortness of breath or wheezing. The symptoms are aggravated by lying down, dust, exercise and pollens. He has tried OTC cough suppressant, cool air, rest and body position changes for the symptoms. The treatment provided no relief. There is no history of asthma or COPD.     Review of Systems   Constitutional:  Positive for chills and fever.   HENT:  Positive for postnasal drip. Negative for ear pain and sore throat.    Eyes:  Negative for pain and visual disturbance.   Respiratory:  Positive for cough. Negative for shortness of breath and wheezing.    Cardiovascular:  Negative for chest pain and palpitations.   Gastrointestinal:  Negative for abdominal pain and vomiting.   Genitourinary:  Negative for dysuria and hematuria.   Musculoskeletal:  Positive for myalgias. Negative for arthralgias and back pain.   Skin:  Positive for rash. Negative for color change.   Neurological:  Positive for headaches. Negative for seizures and syncope.   All other systems reviewed and are negative.      Objective   /58 (BP Location: Right arm, Patient Position: Sitting, Cuff Size: Large)   Pulse 81   Temp 98.7 °F (37.1 °C) (Temporal)   Resp 16    "Ht 5' 7.5\" (1.715 m)   Wt 83.5 kg (184 lb)   SpO2 98%   BMI 28.39 kg/m²      Physical Exam  Vitals and nursing note reviewed.   Constitutional:       General: He is not in acute distress.     Appearance: He is well-developed.   HENT:      Head: Normocephalic and atraumatic.      Nose: Congestion present.      Mouth/Throat:      Mouth: Mucous membranes are moist.   Eyes:      Extraocular Movements: Extraocular movements intact.      Conjunctiva/sclera: Conjunctivae normal.      Pupils: Pupils are equal, round, and reactive to light.   Cardiovascular:      Rate and Rhythm: Normal rate and regular rhythm.      Heart sounds: No murmur heard.  Pulmonary:      Effort: Pulmonary effort is normal. No respiratory distress.      Breath sounds: Normal breath sounds. No wheezing or rhonchi.   Abdominal:      General: Abdomen is flat.      Palpations: Abdomen is soft.      Tenderness: There is no abdominal tenderness.   Musculoskeletal:         General: No swelling.      Cervical back: Neck supple.      Right lower leg: No edema.      Left lower leg: No edema.   Lymphadenopathy:      Cervical: No cervical adenopathy.   Skin:     General: Skin is warm and dry.      Capillary Refill: Capillary refill takes less than 2 seconds.      Findings: No rash.   Neurological:      General: No focal deficit present.      Mental Status: He is alert and oriented to person, place, and time.      Gait: Gait normal.      Deep Tendon Reflexes: Reflexes normal.   Psychiatric:         Mood and Affect: Mood normal.         Behavior: Behavior normal.         Thought Content: Thought content normal.         Judgment: Judgment normal.         "

## 2025-06-18 ENCOUNTER — TELEPHONE (OUTPATIENT)
Dept: UROLOGY | Facility: CLINIC | Age: 75
End: 2025-06-18

## 2025-06-18 NOTE — TELEPHONE ENCOUNTER
LVM proving a friendly reminder to please their PSA done prior to their appt. Informed pt they can get done at any [facility]. Provided office number.

## 2025-06-20 ENCOUNTER — APPOINTMENT (OUTPATIENT)
Dept: LAB | Facility: CLINIC | Age: 75
End: 2025-06-20
Payer: COMMERCIAL

## 2025-06-20 DIAGNOSIS — C61 PROSTATE CANCER (HCC): ICD-10-CM

## 2025-06-20 LAB — PSA SERPL-MCNC: 1.46 NG/ML (ref 0–4)

## 2025-06-20 PROCEDURE — 84153 ASSAY OF PSA TOTAL: CPT

## 2025-06-20 PROCEDURE — 36415 COLL VENOUS BLD VENIPUNCTURE: CPT

## 2025-06-27 ENCOUNTER — OFFICE VISIT (OUTPATIENT)
Dept: UROLOGY | Facility: CLINIC | Age: 75
End: 2025-06-27
Payer: COMMERCIAL

## 2025-06-27 VITALS
SYSTOLIC BLOOD PRESSURE: 120 MMHG | HEART RATE: 54 BPM | WEIGHT: 179 LBS | BODY MASS INDEX: 27.13 KG/M2 | TEMPERATURE: 97.6 F | HEIGHT: 68 IN | DIASTOLIC BLOOD PRESSURE: 62 MMHG | OXYGEN SATURATION: 100 %

## 2025-06-27 DIAGNOSIS — R97.21 RISING PSA FOLLOWING TREATMENT FOR MALIGNANT NEOPLASM OF PROSTATE: ICD-10-CM

## 2025-06-27 DIAGNOSIS — C61 PROSTATE CANCER (HCC): Primary | ICD-10-CM

## 2025-06-27 PROCEDURE — 99213 OFFICE O/P EST LOW 20 MIN: CPT | Performed by: PHYSICIAN ASSISTANT

## 2025-06-27 NOTE — PROGRESS NOTES
Name: Morro Villa      : 1950      MRN: [id number]  Encounter Provider: Sarah Schnitzler, PA-C  Encounter Date: 2025   Encounter department: [facility] [place]  :  Assessment & Plan  Prostate cancer (HCC)  - History [place] 7 prostate cancer, status post robot-assisted laparoscopic prostatectomy (), post prostatectomy PSA vipul 0.7, status post adjuvant radiation therapy, rising PSA, negative PSMA CT PET scan in 2024               Component  Ref Range & Units (hover) 25  1:40 PM 25 12:14 PM 24  1:45 PM 24 11:32 AM 23 10:01 AM 23  9:10 AM 22 10:14 AM   PSA, Diagnostic 1.460 1.13 R, CM 1.26 R, CM 1.07 R, CM 0.93 R, CM 1.07 R, CM 0.78 R, CM        - PSA doubling time of 13.1 months. Will monitor closely with repeat PSA in 6-8 weeks. If concerning rise, will need to obtain repeat PSMA PET CT. I also did reach out to Dr. Cooper for recommendations. Will monitor for and contact with PSA results and plan of care recommendations.   Orders:    PSA Total, Diagnostic; Future    Rising PSA following treatment for malignant neoplasm of prostate    Orders:    PSA Total, Diagnostic; Future        History of Present Illness   Morro Villa is a 74 y.o. male who presents for follow up of recurrent prostate cancer.     He underwent robot-assisted laparoscopic prostatectomy for [place] 7 prostate cancer in . He required adjuvant radiation therapy when his PSA level was as high as 0.7. In 2022 he underwent a PSMA CT PET scan which showed no evidence of disease. This was repeated in 2024 when his PSA level rainer to 1.26. The most recent PSMA CT PET scan shows no evidence of PSMA avid lesions.     AUA SYMPTOM SCORE      Flowsheet Row Most Recent Value   AUA SYMPTOM SCORE    How often have you had a sensation of not emptying your bladder completely after you finished urinating? 0 (P)     How often have you had to urinate again less  than two hours after you finished urinating? 0 (P)     How often have you found you stopped and started again several times when you urinate? 0 (P)     How often have you found it difficult to postpone urination? 1 (P)     How often have you had a weak urinary stream? 0 (P)     How often have you had to push or strain to begin urination? 0 (P)     How many times did you most typically get up to urinate from the time you went to bed at night until the time you got up in the morning? 5 (P)     Quality of Life: If you were to spend the rest of your life with your urinary condition just the way it is now, how would you feel about that? 1 (P)     AUA SYMPTOM SCORE 6 (P)            Review of Systems   Constitutional:  Negative for chills and fever.   Respiratory:  Negative for shortness of breath.    Cardiovascular:  Negative for chest pain.   Gastrointestinal:  Negative for abdominal pain.   Genitourinary:  Negative for difficulty urinating, dysuria, flank pain, frequency, hematuria and urgency.   Neurological:  Negative for dizziness.        Objective   There were no vitals taken for this visit.    Physical Exam  Constitutional:       Appearance: Normal appearance.   HENT:      Head: Normocephalic and atraumatic.      Right Ear: External ear normal.      Left Ear: External ear normal.      Nose: Nose normal.     Eyes:      General: No scleral icterus.     Conjunctiva/sclera: Conjunctivae normal.       Cardiovascular:      Pulses: Normal pulses.   Pulmonary:      Effort: Pulmonary effort is normal.     Musculoskeletal:         General: Normal range of motion.      Cervical back: Normal range of motion.     Neurological:      General: No focal deficit present.      Mental Status: He is alert and oriented to person, place, and time.     Psychiatric:         Mood and Affect: Mood normal.         Behavior: Behavior normal.         Thought Content: Thought content normal.         Judgment: Judgment normal.          Results    Lab Results   Component Value Date    PSA 1.460 06/20/2025    PSA 1.13 01/24/2025    PSA 1.26 05/30/2024     Lab Results   Component Value Date    GLUCOSE 80 10/03/2014    CALCIUM 9.6 01/24/2025     10/03/2014    K 4.0 01/24/2025    CO2 30 01/24/2025     01/24/2025    BUN 16 01/24/2025    CREATININE 0.93 01/24/2025     Lab Results   Component Value Date    WBC 3.7 (L) 01/24/2025    HGB 13.9 01/24/2025    HCT 41.8 01/24/2025    MCV 91.9 01/24/2025     01/24/2025       Office Urine Dip  No results found for this or any previous visit (from the past hour).